# Patient Record
Sex: FEMALE | Race: WHITE | NOT HISPANIC OR LATINO | Employment: FULL TIME | URBAN - METROPOLITAN AREA
[De-identification: names, ages, dates, MRNs, and addresses within clinical notes are randomized per-mention and may not be internally consistent; named-entity substitution may affect disease eponyms.]

---

## 2017-07-20 NOTE — PATIENT INSTRUCTIONS
Preventive Health Recommendations  Female Ages 40 to 49    Yearly exam:     See your health care provider every year in order to  1. Review health changes.   2. Discuss preventive care.    3. Review your medicines if your doctor prescribed any.      Get a Pap test every three years (unless you have an abnormal result and your provider advises testing more often).      If you get Pap tests with HPV test, you only need to test every 5 years, unless you have an abnormal result. You do not need a Pap test if your uterus was removed (hysterectomy) and you have not had cancer.      You should be tested each year for STDs (sexually transmitted diseases), if you're at risk.       Ask your doctor if you should have a mammogram.      Have a colonoscopy (test for colon cancer) if someone in your family has had colon cancer or polyps before age 50.       Have a cholesterol test every 5 years.       Have a diabetes test (fasting glucose) after age 45. If you are at risk for diabetes, you should have this test every 3 years.    Shots: Get a flu shot each year. Get a tetanus shot every 10 years.     Nutrition:     Eat at least 5 servings of fruits and vegetables each day.    Eat whole-grain bread, whole-wheat pasta and brown rice instead of white grains and rice.    Talk to your provider about Calcium and Vitamin D.     Lifestyle    Exercise at least 150 minutes a week (an average of 30 minutes a day, 5 days a week). This will help you control your weight and prevent disease.    Limit alcohol to one drink per day.    No smoking.     Wear sunscreen to prevent skin cancer.    See your dentist every six months for an exam and cleaning.    --------------  1. Labs today: cholesterol, diabetes screen, liver function, kidney function, thyroid function and hemoglobin  2. Track periods - if continue to be heavy and not anemic, would recommend pelvic ultrasound and could discuss other options  3. Try to make small changes to help with  stress

## 2017-07-21 ENCOUNTER — OFFICE VISIT (OUTPATIENT)
Dept: PEDIATRICS | Facility: CLINIC | Age: 47
End: 2017-07-21
Payer: COMMERCIAL

## 2017-07-21 VITALS
WEIGHT: 232.2 LBS | OXYGEN SATURATION: 99 % | TEMPERATURE: 97.5 F | BODY MASS INDEX: 37.32 KG/M2 | HEART RATE: 96 BPM | HEIGHT: 66 IN | SYSTOLIC BLOOD PRESSURE: 110 MMHG | DIASTOLIC BLOOD PRESSURE: 72 MMHG

## 2017-07-21 DIAGNOSIS — N92.1 MENORRHAGIA WITH IRREGULAR CYCLE: ICD-10-CM

## 2017-07-21 DIAGNOSIS — Z13.220 SCREENING CHOLESTEROL LEVEL: ICD-10-CM

## 2017-07-21 DIAGNOSIS — E66.09 NON MORBID OBESITY DUE TO EXCESS CALORIES: ICD-10-CM

## 2017-07-21 DIAGNOSIS — Z13.1 SCREENING FOR DIABETES MELLITUS: ICD-10-CM

## 2017-07-21 DIAGNOSIS — R63.5 WEIGHT GAIN: ICD-10-CM

## 2017-07-21 DIAGNOSIS — F43.9 STRESS: ICD-10-CM

## 2017-07-21 DIAGNOSIS — Z01.411 ENCOUNTER FOR GYNECOLOGICAL EXAMINATION WITH ABNORMAL FINDING: Primary | ICD-10-CM

## 2017-07-21 LAB — HGB BLD-MCNC: 11.5 G/DL (ref 11.7–15.7)

## 2017-07-21 PROCEDURE — 80061 LIPID PANEL: CPT | Performed by: INTERNAL MEDICINE

## 2017-07-21 PROCEDURE — 85018 HEMOGLOBIN: CPT | Performed by: INTERNAL MEDICINE

## 2017-07-21 PROCEDURE — 99386 PREV VISIT NEW AGE 40-64: CPT | Performed by: INTERNAL MEDICINE

## 2017-07-21 PROCEDURE — 84443 ASSAY THYROID STIM HORMONE: CPT | Performed by: INTERNAL MEDICINE

## 2017-07-21 PROCEDURE — 80053 COMPREHEN METABOLIC PANEL: CPT | Performed by: INTERNAL MEDICINE

## 2017-07-21 PROCEDURE — 36415 COLL VENOUS BLD VENIPUNCTURE: CPT | Performed by: INTERNAL MEDICINE

## 2017-07-21 PROCEDURE — 99213 OFFICE O/P EST LOW 20 MIN: CPT | Mod: 25 | Performed by: INTERNAL MEDICINE

## 2017-07-21 NOTE — MR AVS SNAPSHOT
After Visit Summary   7/21/2017    Veronica Messina    MRN: [id number]           Patient Information     Date Of Birth          1970        Visit Information        Provider Department      7/21/2017 9:00 AM Liza Day MD [facility]        Today's Diagnoses     Encounter for gynecological examination with abnormal finding    -  1    Menorrhagia with irregular cycle        Screening cholesterol level        Screening for diabetes mellitus          Care Instructions      Preventive Health Recommendations  Female Ages 40 to 49    Yearly exam:     See your health care provider every year in order to  1. Review health changes.   2. Discuss preventive care.    3. Review your medicines if your doctor prescribed any.      Get a Pap test every three years (unless you have an abnormal result and your provider advises testing more often).      If you get Pap tests with HPV test, you only need to test every 5 years, unless you have an abnormal result. You do not need a Pap test if your uterus was removed (hysterectomy) and you have not had cancer.      You should be tested each year for STDs (sexually transmitted diseases), if you're at risk.       Ask your doctor if you should have a mammogram.      Have a colonoscopy (test for colon cancer) if someone in your family has had colon cancer or polyps before age 50.       Have a cholesterol test every 5 years.       Have a diabetes test (fasting glucose) after age 45. If you are at risk for diabetes, you should have this test every 3 years.    Shots: Get a flu shot each year. Get a tetanus shot every 10 years.     Nutrition:     Eat at least 5 servings of fruits and vegetables each day.    Eat whole-grain bread, whole-wheat pasta and brown rice instead of white grains and rice.    Talk to your provider about Calcium and Vitamin D.     Lifestyle    Exercise at least 150 minutes a week (an average of 30 minutes a day, 5 days a week). This  "will help you control your weight and prevent disease.    Limit alcohol to one drink per day.    No smoking.     Wear sunscreen to prevent skin cancer.    See your dentist every six months for an exam and cleaning.    --------------  1. Labs today: cholesterol, diabetes screen, liver function, kidney function, thyroid function and hemoglobin  2. Track periods - if continue to be heavy and not anemic, would recommend pelvic ultrasound and could discuss other options  3. Try to make small changes to help with stress          Follow-ups after your visit        Who to contact     If you have questions or need follow up information about today's clinic visit or your schedule please contact [facility] directly at [phone].  Normal or non-critical lab and imaging results will be communicated to you by Breathometerhart, letter or phone within 4 business days after the clinic has received the results. If you do not hear from us within 7 days, please contact the clinic through Yokat or phone. If you have a critical or abnormal lab result, we will notify you by phone as soon as possible.  Submit refill requests through Amp'd Mobile or call your pharmacy and they will forward the refill request to us. Please allow 3 business days for your refill to be completed.          Additional Information About Your Visit        Amp'd Mobile Information     Amp'd Mobile lets you send messages to your doctor, view your test results, renew your prescriptions, schedule appointments and more. To sign up, go to [email or web address] . Click on \"Log in\" on the left side of the screen, which will take you to the Welcome page. Then click on \"Sign up Now\" on the right side of the page.     You will be asked to enter the access code listed below, as well as some personal information. Please follow the directions to create your username and password.     Your access code is: [email or web address]  Expires: 10/19/2017 10:18 AM     Your access code will  in " "90 days. If you need help or a new code, please call your [facility] clinic or [phone].        Care EveryWhere ID     This is your Care EveryWhere ID. This could be used by other organizations to access your [facility] medical records  [id number]        Your Vitals Were     Pulse Temperature Height Pulse Oximetry BMI (Body Mass Index)       96 97.5  F (36.4  C) (Tympanic) 5' 6\" (1.676 m) 99% 37.48 kg/m2        Blood Pressure from Last 3 Encounters:   07/21/17 110/72    Weight from Last 3 Encounters:   07/21/17 232 lb 3.2 oz (105.3 kg)              We Performed the Following     Comprehensive metabolic panel     Hemoglobin     Lipid panel reflex to direct LDL     TSH with free T4 reflex        Primary Care Provider Fax #    [phone]       No address on file        Equal Access to Services     JAMES MURPHY : Hadrosa Portillo, wanicolasa rosenberg, delmisybolivier kaalmafigueroa herrera, cristobal mckinney . So [facility] [phone].    ATENCIÓN: Si habla español, tiene a feldman disposición servicios gratuitos de asistencia lingüística. Llame al [phone].    We comply with applicable federal civil rights laws and [place] laws. We do not discriminate on the basis of race, color, national origin, age, disability sex, sexual orientation or gender identity.            Thank you!     Thank you for choosing [facility]  for your care. Our goal is always to provide you with excellent care. Hearing back from our patients is one way we can continue to improve our services. Please take a few minutes to complete the written survey that you may receive in the mail after your visit with us. Thank you!             Your Updated Medication List - Protect others around you: Learn how to safely use, store and throw away your medicines at www.disposemymeds.org.      Notice  As of 7/21/2017 10:18 AM    You have not been prescribed any medications.      "

## 2017-07-21 NOTE — PROGRESS NOTES
SUBJECTIVE:   CC: Veronica Messina is an 47 year old woman who presents for preventive health visit.     Physical   Annual:     Getting at least 3 servings of Calcium per day::  Yes    Bi-annual eye exam::  Yes    Dental care twice a year::  Yes    Sleep apnea or symptoms of sleep apnea::  None    Diet::  Regular (no restrictions)    Taking medications regularly::  Yes    Medication side effects::  Not applicable    Additional concerns today::  YES    Weight gain, stress: pt has adopted girl with ADHD who is 8 years old. Very stressful to raise her. Does not listen and hard to get her to do things. In addition, has a lot of financial stressors. Some of it is related to cost of healthcare for daughter. Currently working 3 jobs to try to pay off bills. 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 Boss was not very supportive. Is getting used to position and doing somewhat better now.  tries to help, but majority of household tasks still fall on patient. Has little time to herself. Not able to exercise. Has been eating to deal with stress and gained a lot of weight. Did not realize how much until weighed today. Has been struggle whole life. Had trouble with infertility and told needed to lose weight. Was able to do so with atkins diet but unable to keep a pregnancy. Frustrated and upset she has let herself gain so much of it back. Has some symptoms of depression, but does not feel depressed. Does not feel needs medication, but would like to lose weight.    Menorrhagia: Periods heavier over last 1.5-2 years. Have been more irregular. Has back pain, night sweats before. Tend to be heavy when goes longer than 4 weeks between cycles. Has soaked through pad and stained through clothes before. Is embarrassing.     Today's PHQ-2 Score:   PHQ-2 ( 1999 [employer]) 7/21/2017   Q1: Little interest or pleasure in doing things 0   Q2: Feeling down, depressed or hopeless 1   PHQ-2 Score 1   Q1: Little interest  or pleasure in doing things Not at all   Q2: Feeling down, depressed or hopeless Several days   PHQ-2 Score 1     Abuse: Current or Past(Physical, Sexual or Emotional)- No  Do you feel safe in your environment - Yes    Social History   Substance Use Topics     Smoking status: Never Smoker     Smokeless tobacco: Never Used     Alcohol use No      Comment: occ     The patient does not drink >3 drinks per day nor >7 drinks per week.    Reviewed orders with patient.  Reviewed health maintenance and updated orders accordingly - Yes  BP Readings from Last 3 Encounters:   07/21/17 110/72    Wt Readings from Last 3 Encounters:   07/21/17 232 lb 3.2 oz (105.3 kg)         Patient under age 50, mutual decision reflected in health maintenance. Last mammo 2012    Pertinent mammograms are reviewed under the imaging tab.  History of abnormal Pap smear: NO - age 30- 65 PAP every 3 years recommended    Reviewed and updated as needed this visit by clinical staffTobacco  Allergies  Meds  Problems  Med Hx  Surg Hx  Fam Hx  Soc Hx        Reviewed and updated as needed this visit by Provider  Allergies  Meds  Problems          ROS:  C: weight gain, NEGATIVE for fever, chills  I: NEGATIVE for worrisome rashes, moles or lesions  E: NEGATIVE for vision changes or irritation  ENT: NEGATIVE for ear, mouth and throat problems  R: NEGATIVE for significant cough or SOB  B: NEGATIVE for masses, tenderness or discharge  CV: NEGATIVE for chest pain, palpitations or peripheral edema  GI: NEGATIVE for nausea, abdominal pain, heartburn, or change in bowel habits  : NEGATIVE for unusual urinary or vaginal symptoms. Periods are irregular and heavy.  M: NEGATIVE for significant arthralgias or myalgia  N: NEGATIVE for weakness, dizziness or paresthesias  P: stress, NEGATIVE for changes in mood or affect     OBJECTIVE:   /72 (BP Location: Right arm, Patient Position: Chair, Cuff Size: Adult Large)  Pulse 96  Temp 97.5  F (36.4  C)  "(Tympanic)  Ht 5' 6\" (1.676 m)  Wt 232 lb 3.2 oz (105.3 kg)  SpO2 99%  BMI 37.48 kg/m2  EXAM:  GENERAL: obese, alert and no distress  EYES: Eyes grossly normal to inspection, PERRL and conjunctivae and sclerae normal  HENT: ear canals and TM's normal, nose and mouth without ulcers or lesions  NECK: no adenopathy, no asymmetry, masses, or scars and thyroid normal to palpation  RESP: lungs clear to auscultation - no rales, rhonchi or wheezes  BREAST: normal without masses, tenderness or nipple discharge and no palpable axillary masses or adenopathy  CV: regular rate and rhythm, normal S1 S2, no S3 or S4, no murmur, click or rub, no peripheral edema and peripheral pulses strong  ABDOMEN: obese, soft, nontender, no hepatosplenomegaly, no masses and bowel sounds normal  MS: no gross musculoskeletal defects noted, no edema  SKIN: no suspicious lesions or rashes  NEURO: Normal strength and tone, mentation intact and speech normal  PSYCH: crying at times    Results for orders placed or performed in visit on 07/21/17   Lipid panel reflex to direct LDL   Result Value Ref Range    Cholesterol 188 <200 mg/dL    Triglycerides 66 <150 mg/dL    HDL Cholesterol 78 >49 mg/dL    LDL Cholesterol Calculated 97 <100 mg/dL    Non HDL Cholesterol 110 <130 mg/dL   Hemoglobin   Result Value Ref Range    Hemoglobin 11.5 (L) 11.7 - 15.7 g/dL   Comprehensive metabolic panel   Result Value Ref Range    Sodium 140 133 - 144 mmol/L    Potassium 4.2 3.4 - 5.3 mmol/L    Chloride 105 94 - 109 mmol/L    Carbon Dioxide 27 20 - 32 mmol/L    Anion Gap 8 3 - 14 mmol/L    Glucose 93 70 - 99 mg/dL    Urea Nitrogen 11 7 - 30 mg/dL    Creatinine 0.73 0.52 - 1.04 mg/dL    GFR Estimate 85 >60 mL/min/1.7m2    GFR Estimate If Black >90   GFR Calc   >60 mL/min/1.7m2    Calcium 9.2 8.5 - 10.1 mg/dL    Bilirubin Total 0.4 0.2 - 1.3 mg/dL    Albumin 3.9 3.4 - 5.0 g/dL    Protein Total 7.9 6.8 - 8.8 g/dL    Alkaline Phosphatase 78 40 - 150 U/L    " ALT 17 0 - 50 U/L    AST 18 0 - 45 U/L   TSH with free T4 reflex   Result Value Ref Range    TSH 1.03 0.40 - 4.00 mU/L       ASSESSMENT/PLAN:   1. Encounter for gynecological examination with abnormal finding  Pap q3 - due 2018  Would like to hold off on mammo - last in 2012, but no family history  tdap UTD  Chol today    2. Weight gain  Very likely due to stress and eating habits. Encouraged to make small changes at a time both in diet and in finding time for exercise so does not get too overwhelmed. Pt knows what she needs to do to lose weight.   - OFFICE/OUTPT VISIT,EST,LEVL III    3. Menorrhagia with irregular cycle  Likely due to perimenopause. Very mild anemia. Recommend increase iron in diet. Would like to hold off on now for pelvic US, but potentially interested in ablation or hyst to take care of heavy bleeding. Will let me know when she is ready for the US.  - Hemoglobin  - TSH with free T4 reflex  - OFFICE/OUTPT VISIT,EST,LEVL III    4. Stress  Home, work and financial. Working with family therapist. Believes will have some individual therapy as part of this as well. Discussed sleep, exercise and meditation. Again encouraged small changes to help with lifestyle, home/work balance. Encouraged to ask for small things from daughter/  to help make this better and maybe will be able to equalize tasks more if makes changes gradually. Will f/u if worsens.  - OFFICE/OUTPT VISIT,EST,LEVL III    5. Non morbid obesity due to excess calories  As above. Pt aware of triggers and has successfully lost weight in past. Very overwhelmed right now. Encouraged to make slow changes to more healthy habits.   - OFFICE/OUTPT VISIT,EST,LEVL III    6. Screening cholesterol level  - Lipid panel reflex to direct LDL    7. Screening for diabetes mellitus  - Comprehensive metabolic panel    COUNSELING:  Reviewed preventive health counseling, as reflected in patient instructions  Special attention given to:        Regular  "exercise       Healthy diet/nutrition       (Jovita)menopause management       reports that she has never smoked. She has never used smokeless tobacco.    Estimated body mass index is 37.48 kg/(m^2) as calculated from the following:    Height as of this encounter: 5' 6\" (1.676 m).    Weight as of this encounter: 232 lb 3.2 oz (105.3 kg).   Weight management plan: Discussed healthy diet and exercise guidelines and patient will follow up in 6 months in clinic to re-evaluate.    Counseling Resources:  ATP IV Guidelines  Pooled Cohorts Equation Calculator  Breast Cancer Risk Calculator  FRAX Risk Assessment  ICSI Preventive Guidelines  Dietary Guidelines for Americans, 2010  USDA's MyPlate  ASA Prophylaxis  Lung CA Screening    Liza Day MD  [facility]    Answers for HPI/ROS submitted by the patient on 7/21/2017   PHQ-2 Score: 1    "

## 2017-07-21 NOTE — NURSING NOTE
"Chief Complaint   Patient presents with     Physical       Initial /72 (BP Location: Right arm, Patient Position: Chair, Cuff Size: Adult Large)  Pulse 96  Temp 97.5  F (36.4  C) (Tympanic)  Ht 5' 6\" (1.676 m)  Wt 232 lb 3.2 oz (105.3 kg)  SpO2 99%  BMI 37.48 kg/m2 Estimated body mass index is 37.48 kg/(m^2) as calculated from the following:    Height as of this encounter: 5' 6\" (1.676 m).    Weight as of this encounter: 232 lb 3.2 oz (105.3 kg).  Medication Reconciliation: complete   Val Bryant LPN      "

## 2017-07-22 PROBLEM — E66.09 NON MORBID OBESITY DUE TO EXCESS CALORIES: Status: ACTIVE | Noted: 2017-07-22

## 2017-07-22 LAB
ALBUMIN SERPL-MCNC: 3.9 G/DL (ref 3.4–5)
ALP SERPL-CCNC: 78 U/L (ref 40–150)
ALT SERPL W P-5'-P-CCNC: 17 U/L (ref 0–50)
ANION GAP SERPL CALCULATED.3IONS-SCNC: 8 MMOL/L (ref 3–14)
AST SERPL W P-5'-P-CCNC: 18 U/L (ref 0–45)
BILIRUB SERPL-MCNC: 0.4 MG/DL (ref 0.2–1.3)
BUN SERPL-MCNC: 11 MG/DL (ref 7–30)
CALCIUM SERPL-MCNC: 9.2 MG/DL (ref 8.5–10.1)
CHLORIDE SERPL-SCNC: 105 MMOL/L (ref 94–109)
CHOLEST SERPL-MCNC: 188 MG/DL
CO2 SERPL-SCNC: 27 MMOL/L (ref 20–32)
CREAT SERPL-MCNC: 0.73 MG/DL (ref 0.52–1.04)
GFR SERPL CREATININE-BSD FRML MDRD: 85 ML/MIN/1.7M2
GLUCOSE SERPL-MCNC: 93 MG/DL (ref 70–99)
HDLC SERPL-MCNC: 78 MG/DL
LDLC SERPL CALC-MCNC: 97 MG/DL
NONHDLC SERPL-MCNC: 110 MG/DL
POTASSIUM SERPL-SCNC: 4.2 MMOL/L (ref 3.4–5.3)
PROT SERPL-MCNC: 7.9 G/DL (ref 6.8–8.8)
SODIUM SERPL-SCNC: 140 MMOL/L (ref 133–144)
TRIGL SERPL-MCNC: 66 MG/DL
TSH SERPL DL<=0.005 MIU/L-ACNC: 1.03 MU/L (ref 0.4–4)

## 2017-07-31 ENCOUNTER — TELEPHONE (OUTPATIENT)
Dept: PEDIATRICS | Facility: CLINIC | Age: 47
End: 2017-07-31

## 2017-07-31 NOTE — TELEPHONE ENCOUNTER
Pt had px done on 07/21, just found out that the biometric screening form need to be turned in by tonight to receive the $750 incentive. Pt would like us to fill the form asap & get back to her through email.     Provided my work email id, will await for pt's email. Received email, filled form & emailed back to pt as per her request. Sent email back to pt that I have emailed it about 10 mnis ago. Saved emailed form in Leah's faxed pile, in case if pt didn't receive the form.    Fritz, RN  Triage Nurse

## 2018-01-18 ENCOUNTER — TELEPHONE (OUTPATIENT)
Dept: PEDIATRICS | Facility: CLINIC | Age: 48
End: 2018-01-18

## 2018-01-18 NOTE — TELEPHONE ENCOUNTER
"Patient Communication Preferences indicate  Do not contact  and/or communication by \"Phone\" is not preferred. Call not required per Outreach team.        Outreach ,  Sudhakar Whitney     "

## 2018-05-03 ENCOUNTER — OFFICE VISIT (OUTPATIENT)
Dept: PEDIATRICS | Facility: CLINIC | Age: 48
End: 2018-05-03
Payer: COMMERCIAL

## 2018-05-03 VITALS
SYSTOLIC BLOOD PRESSURE: 128 MMHG | HEART RATE: 58 BPM | TEMPERATURE: 97.4 F | HEIGHT: 66 IN | OXYGEN SATURATION: 99 % | BODY MASS INDEX: 36.9 KG/M2 | DIASTOLIC BLOOD PRESSURE: 66 MMHG | WEIGHT: 229.6 LBS

## 2018-05-03 DIAGNOSIS — M79.672 LEFT FOOT PAIN: ICD-10-CM

## 2018-05-03 DIAGNOSIS — Z00.01 ENCOUNTER FOR ROUTINE ADULT HEALTH EXAMINATION WITH ABNORMAL FINDINGS: Primary | ICD-10-CM

## 2018-05-03 DIAGNOSIS — Z13.220 SCREENING CHOLESTEROL LEVEL: ICD-10-CM

## 2018-05-03 DIAGNOSIS — Z13.1 SCREENING FOR DIABETES MELLITUS: ICD-10-CM

## 2018-05-03 DIAGNOSIS — Z12.31 ENCOUNTER FOR SCREENING MAMMOGRAM FOR BREAST CANCER: ICD-10-CM

## 2018-05-03 DIAGNOSIS — D64.9 ANEMIA, UNSPECIFIED TYPE: ICD-10-CM

## 2018-05-03 LAB — HGB BLD-MCNC: 11.4 G/DL (ref 11.7–15.7)

## 2018-05-03 PROCEDURE — 36415 COLL VENOUS BLD VENIPUNCTURE: CPT | Performed by: INTERNAL MEDICINE

## 2018-05-03 PROCEDURE — 80061 LIPID PANEL: CPT | Performed by: INTERNAL MEDICINE

## 2018-05-03 PROCEDURE — 80053 COMPREHEN METABOLIC PANEL: CPT | Performed by: INTERNAL MEDICINE

## 2018-05-03 PROCEDURE — 99213 OFFICE O/P EST LOW 20 MIN: CPT | Mod: 25 | Performed by: INTERNAL MEDICINE

## 2018-05-03 PROCEDURE — 99396 PREV VISIT EST AGE 40-64: CPT | Performed by: INTERNAL MEDICINE

## 2018-05-03 PROCEDURE — 85018 HEMOGLOBIN: CPT | Performed by: INTERNAL MEDICINE

## 2018-05-03 ASSESSMENT — ENCOUNTER SYMPTOMS
FEVER: 0
WEAKNESS: 0
MYALGIAS: 1
JOINT SWELLING: 0
HEMATOCHEZIA: 0
HEARTBURN: 0
DIZZINESS: 0
DYSURIA: 0
SORE THROAT: 0
DIARRHEA: 0
COUGH: 0
CONSTIPATION: 0
HEMATURIA: 0
EYE PAIN: 0
FREQUENCY: 0
CHILLS: 0
HEADACHES: 0
ABDOMINAL PAIN: 0
NERVOUS/ANXIOUS: 0
NAUSEA: 0
PALPITATIONS: 0
PARESTHESIAS: 0
ARTHRALGIAS: 0

## 2018-05-03 NOTE — PROGRESS NOTES
SUBJECTIVE:   CC: Veronica Messina is an 48 year old woman who presents for preventive health visit.     Physical   Annual:     Getting at least 3 servings of Calcium per day::  Yes    Bi-annual eye exam::  Yes    Dental care twice a year::  Yes    Sleep apnea or symptoms of sleep apnea::  None    Diet::  Regular (no restrictions)    Frequency of exercise::  1 day/week    Duration of exercise::  15-30 minutes    Taking medications regularly::  Yes    Medication side effects::  Not applicable    Additional concerns today::  YES          Needs cholesterol/diabetes screen for work.      Periods - about every other month is very heavy. Getting every month. Still gets night sweats right before menstrual cycle. No hot flashes during the day.     Moles - has one on side of breast and on stomach that are changing. Has had previous moles removed that have been ok in the past. Has a history of tanning.    Musculoskeletal problem/pain      Duration: 2/2018    Description  Location: left foot    Intensity:  moderate, severe    Accompanying signs and symptoms: radiation of pain to calf and redness    History  Previous similar problem: no   Previous evaluation:  none    Precipitating or alleviating factors:  Trauma or overuse: no   Aggravating factors include: standing, walking, climbing stairs, exercise and overuse    Therapies tried and outcome: rest/inactivity    About 4 months ago was walking in [place] on business trip, had a sharp pain in bottom of heal. Was getting shooting pain up calf. Pain throughout the day, worse later in the pain. Painful, burning pressure on bottom of foot. Worse with not wearing shoes. Has not been wearing heals. Uses aleve 1 pill a day or less. Doesn't really help. No previous similar symptoms. Ok if on ball of foot or toes. Worse if walks or stands, puts pressure on bottom of foot.    Today's PHQ-2 Score:   PHQ-2 ( 1999 [employer]) 5/3/2018   Q1: Little interest or pleasure in doing things 0   Q2:  Feeling down, depressed or hopeless 0   PHQ-2 Score 0   Q1: Little interest or pleasure in doing things Not at all   Q2: Feeling down, depressed or hopeless Not at all   PHQ-2 Score 0     Abuse: Current or Past(Physical, Sexual or Emotional)- No  Do you feel safe in your environment - Yes    Social History   Substance Use Topics     Smoking status: Never Smoker     Smokeless tobacco: Never Used     Alcohol use No      Comment: occ     Alcohol Use 5/3/2018   If you drink alcohol do you typically have greater than 3 drinks per day OR greater than 7 drinks per week? No   No flowsheet data found.    Reviewed orders with patient.  Reviewed health maintenance and updated orders accordingly - Yes  Patient Active Problem List   Diagnosis     obesity     Past Surgical History:   Procedure Laterality Date     BUNIONECTOMY Left      BUNIONECTOMY Right      HC REPAIR OF HAMMERTOE,ONE Right      HYSTEROSCOPY       TONSILLECTOMY  1977       Social History   Substance Use Topics     Smoking status: Never Smoker     Smokeless tobacco: Never Used     Alcohol use No      Comment: occ     Family History   Problem Relation Age of Onset     Hypertension Mother      DIABETES Mother      Prostate Problems Father      Arrhythmia Brother      WPW     Obesity Brother      Thyroid Disease Maternal Grandmother      Dementia Maternal Grandmother      DIABETES Maternal Grandfather      KIDNEY DISEASE Maternal Grandfather      HEART DISEASE Maternal Grandfather      HEART DISEASE Paternal Grandmother      CANCER Paternal Grandfather      throat     Lung Cancer Paternal Grandfather          Patient under age 50, mutual decision reflected in health maintenance.    Pertinent mammograms are reviewed under the imaging tab.  History of abnormal Pap smear: NO - age 30- 65 PAP every 3 years recommended    Reviewed and updated as needed this visit by clinical staff  Tobacco  Allergies  Meds  Problems  Med Hx  Surg Hx  Fam Hx  Soc Hx        Reviewed  "and updated as needed this visit by Provider  Allergies  Meds  Problems          Review of Systems   Constitutional: Negative for chills and fever.   HENT: Negative for congestion, ear pain, hearing loss and sore throat.    Eyes: Negative for pain and visual disturbance.   Respiratory: Negative for cough.    Cardiovascular: Negative for chest pain, palpitations and peripheral edema.   Gastrointestinal: Negative for abdominal pain, constipation, diarrhea, heartburn, hematochezia and nausea.   Genitourinary: Positive for urgency. Negative for dysuria, frequency, genital sores, hematuria, pelvic pain and vaginal bleeding.   Musculoskeletal: Positive for myalgias. Negative for arthralgias and joint swelling.        Left foot pain   Skin: Negative for rash.        Changing mole on left breast and left abdomen   Neurological: Negative for dizziness, weakness, headaches and paresthesias.   Psychiatric/Behavioral: Negative for mood changes. The patient is not nervous/anxious.       OBJECTIVE:   /66 (BP Location: Right arm, Patient Position: Sitting, Cuff Size: Adult Large)  Pulse 58  Temp 97.4  F (36.3  C) (Tympanic)  Ht 5' 6\" (1.676 m)  Wt 229 lb 9.6 oz (104.1 kg)  LMP 04/10/2018 (Approximate)  SpO2 99%  BMI 37.06 kg/m2  Physical Exam  GENERAL: obese, alert and no distress  EYES: Eyes grossly normal to inspection, PERRL and conjunctivae and sclerae normal  HENT: ear canals and TM's normal, nose and mouth without ulcers or lesions  NECK: no adenopathy, no asymmetry, masses, or scars and thyroid normal to palpation  RESP: lungs clear to auscultation - no rales, rhonchi or wheezes  BREAST: normal without masses, tenderness or nipple discharge and no palpable axillary masses or adenopathy  CV: regular rate and rhythm, normal S1 S2, no S3 or S4, no murmur, click or rub, no peripheral edema and peripheral pulses strong  ABDOMEN: soft, nontender, no hepatosplenomegaly, no masses and bowel sounds normal  MS: normal " ROM of foot. Tenderness under left arch extending toward heal. No tenderness over heel itself or over achilles. no gross musculoskeletal defects noted, no edema  SKIN: seborrheic keratosis over left breast and left abdomen. Multiple moles. no suspicious lesions or rashes  NEURO: Normal strength and tone, mentation intact and speech normal  PSYCH: mentation appears normal, affect normal/bright    ASSESSMENT/PLAN:   1. Encounter for routine adult health examination with abnormal findings  Pap due next year  Will schedule mammogram  Plan on skin check with Dermatology in 1-2 years   tdap UTD    2. Anemia, unspecified type  Due to heavy periods  - Hemoglobin    3. Left foot pain  Bottom of left foot. Most likely plantar fasciitis; however, sudden onset a little unusual. Discussed importance of wearing supportive shoes at all times from when gets up in the am. Ice to foot. Stretches given. Recommend see podiatry for evaluation given length of time with pain.  - PODIATRY/FOOT & ANKLE SURGERY REFERRAL    4. Screening cholesterol level  - Comprehensive metabolic panel  Will mail completed form back to patient once labs back. Does not need faxed because employee number not on form.    5. Screening for diabetes mellitus  - Lipid panel reflex to direct LDL Fasting    6. Encounter for screening mammogram for breast cancer  - MA Screening Digital Bilateral; Future    COUNSELING:  Reviewed preventive health counseling, as reflected in patient instructions  Special attention given to:        Regular exercise       Healthy diet/nutrition       (Jovita)menopause management    BP Screening:   Last 3 BP Readings:    BP Readings from Last 3 Encounters:   05/03/18 128/66   07/21/17 110/72       The following was recommended to the patient:  Re-screen BP within a year and recommended lifestyle modifications     reports that she has never smoked. She has never used smokeless tobacco.    Estimated body mass index is 37.06 kg/(m^2) as  "calculated from the following:    Height as of this encounter: 5' 6\" (1.676 m).    Weight as of this encounter: 229 lb 9.6 oz (104.1 kg).   Weight management plan: Discussed healthy diet and exercise guidelines and patient will follow up in 6 months in clinic to re-evaluate.    Counseling Resources:  ATP IV Guidelines  Pooled Cohorts Equation Calculator  Breast Cancer Risk Calculator  FRAX Risk Assessment  ICSI Preventive Guidelines  Dietary Guidelines for Americans, 2010  USDA's MyPlate  ASA Prophylaxis  Lung CA Screening    Liza Day MD  [facility]    Answers for HPI/ROS submitted by the patient on 5/3/2018   PHQ-2 Score: 0    "

## 2018-05-03 NOTE — PATIENT INSTRUCTIONS
Preventive Health Recommendations  Female Ages 40 to 49    Yearly exam:     See your health care provider every year in order to  1. Review health changes.   2. Discuss preventive care.    3. Review your medicines if your doctor prescribed any.      Get a Pap test every three years (unless you have an abnormal result and your provider advises testing more often).      If you get Pap tests with HPV test, you only need to test every 5 years, unless you have an abnormal result. You do not need a Pap test if your uterus was removed (hysterectomy) and you have not had cancer.      You should be tested each year for STDs (sexually transmitted diseases), if you're at risk.       Ask your doctor if you should have a mammogram.      Have a colonoscopy (test for colon cancer) if someone in your family has had colon cancer or polyps before age 50.       Have a cholesterol test every 5 years.       Have a diabetes test (fasting glucose) after age 45. If you are at risk for diabetes, you should have this test every 3 years.    Shots: Get a flu shot each year. Get a tetanus shot every 10 years.     Nutrition:     Eat at least 5 servings of fruits and vegetables each day.    Eat whole-grain bread, whole-wheat pasta and brown rice instead of white grains and rice.    Talk to your provider about Calcium and Vitamin D.     Lifestyle    Exercise at least 150 minutes a week (an average of 30 minutes a day, 5 days a week). This will help you control your weight and prevent disease.    Limit alcohol to one drink per day.    No smoking.     Wear sunscreen to prevent skin cancer.    See your dentist every six months for an exam and cleaning.    -------------  1. Labs today: cholesterol, blood sugar, liver function, kidney function, hemoglobin  2. Pap due next year  3. Will mail form back to you once labs are completed  4. Schedule appointment with Podiatry  - ice to bottom of food  - continue aleve as needed  - stretches below  - try to  wear supportive shoes from the time you get up in the morning  5. Schedule mammogram - [phone]  6. Recommend full skin check with Dermatology next year                           Plantar Fasciitis   What is plantar fasciitis?   Plantar fasciitis is a painful inflammation of the bottom of the foot between the ball of the foot and the heel.   How does it occur?   There are several possible causes of plantar fasciitis, including:     wearing high heels     gaining weight     increased walking, standing, or stair-climbing   If you wear high-heeled shoes, including western-style boots, for long periods of time, the tough, tendonlike tissue of the bottom of your foot can become shorter. This layer of tissue is called fascia. Pain occurs when you stretch fascia that has shortened. This painful stretching might happen, for example, when you walk barefoot after getting out of bed in the morning.   If you gain weight, you might be more likely to have plantar fasciitis, especially if you walk a lot or  shoes with poor heel cushioning. Normally there is a pad of fatty tissue under your heel bone. Weight gain might break down this fat pad and cause heel pain.   Runners may get plantar fasciitis when they change their workout and increase their mileage or frequency of workouts. It can also occur with a change in exercise surface or terrain, or if your shoes are worn out and don't provide enough cushion for your heels.   If the arches of your foot are abnormally high or low, you are more likely to develop plantar fasciitis than if your arches are normal.   What are the symptoms?   The main symptom of plantar fasciitis is heel pain when you walk. You may also feel pain when you stand and possibly even when you are resting. This pain typically occurs first thing in the morning after you get out of bed, when your foot is placed flat on the floor. The pain occurs because you are stretching the plantar fascia. The pain  usually lessens with more walking, but you may have it again after periods of rest.   You may feel no pain when you are sleeping because the position of your feet during rest allows the fascia to shorten and relax.   How is it diagnosed?   Your healthcare provider will ask about your symptoms. He or she will ask if the bottom of your heel is tender and if you have pain when you stretch the bottom of your foot. An X-ray of your heel may be done.   How is it treated?     Give your painful heel lots of rest. You may need to stay completely off your foot for several days when the pain is severe.     Your healthcare provider may recommend or prescribe anti-inflammatory medicines, such as aspirin or ibuprofen. These drugs decrease pain and inflammation. Adults aged 65 years and older should not take non-steroidal anti-inflammatory medicine for more than 7 days without their healthcare provider's approval. Resting your heel on an ice pack for a few minutes several times a day can also help.     Try to cushion your foot. You can do this by wearing athletic shoes, even at work, for awhile. Heel cushions can also be used. The cushions should be worn in both shoes. They are most helpful if you are overweight or an older adult.     Your provider may recommend special arch supports or inserts for your shoes called orthotics, either custom-made or off the shelf. These supports can be particularly helpful if you have flat feet or high arches.     Your provider may recommend an injection of a cortisone-like medicine.     Lose weight if needed.     A night splint may be recommended. This will keep the plantar fascia stretched while you are sleeping.     Physical therapy for additional treatments may be recommended     Surgery is rarely needed.   How long will the effects last?   You may find that the pain is sometimes worse and sometimes better over time. If you get treatment soon after you notice the pain, the symptoms should stop  after several weeks. If, however, you have had plantar fasciitis for a long time, it may take many weeks to months for the pain to go away.   When can I return to my normal activities?   Everyone recovers from an injury at a different rate. Return to your activities will be determined by how soon your foot recovers, not by how many days or weeks it has been since your injury has occurred. In general, the longer you have symptoms before you start treatment, the longer it will take to get better. The goal of rehabilitation is to return you to your normal activities as soon as is safely possible. If you return too soon you may worsen your injury.   You may safely return to your activities when, starting from the top of the list and progressing to the end, each of the following is true:     You have full range of motion in the injured foot compared with the uninjured foot.     You have full strength of the injured foot compared with the uninjured foot.     You can walk straight ahead without significant pain or limping.   How can I prevent plantar fasciitis?   The best way to prevent plantar fasciitis is to wear shoes that are well made and fit your feet. This is especially important when you exercise or walk a lot or stand for a long time on hard surfaces. Get new athletic shoes before your old shoes stop supporting and cushioning your feet.   You should also:     Avoid repeated jarring to the heel.     Keep a healthy weight.     Do your leg and foot stretching exercises regularly.   Developed by [employer].   Published by [employer].   Last modified: 2008-08-11   Last reviewed: 2008-07-07   This content is reviewed periodically and is subject to change as new health information becomes available. The information is intended to inform and educate and is not a replacement for medical evaluation, advice, diagnosis or treatment by a healthcare professional.   Sports Medicine Advisor 2009.1 Index  Sports Medicine Advisor  2009.1 Credits     2009 [facility] and/or its affiliates. All Rights Reserved.               Plantar Fasciitis Rehabilitation Exercises   You may begin exercising the muscles of your foot right away by gently stretching them as follows:     Prone hip extension: Lie on your stomach with your legs straight out behind you. Tighten the buttocks and thigh muscles of your injured leg and lift it off the floor about 8 inches. Keep your knee straight. Hold for 5 seconds. Then lower your leg and relax. Do 3 sets of 10.     Towel stretch: Sit on a hard surface with one leg stretched out in front of you. Loop a towel around your toes and the ball of your foot and pull the towel toward your body keeping your knee straight. Hold this position for 15 to 30 seconds then relax. Repeat 3 times.   When the towel stretch becomes too easy, you may begin doing the standing calf stretch.     Standing calf stretch: Facing a wall, put your hands against the wall at about eye level. Keep one leg back with the heel on the floor, and the other leg forward. Turn your back foot slightly inward (as if you were pigeon-toed) as you slowly lean into the wall until you feel a stretch in the back of your calf. Hold for 15 to 30 seconds. Repeat 3 times and then switch the position of your legs and repeat the exercise 3 times. Do this exercise several times each day.     Sitting plantar fascia stretch: Sit in a chair and cross one foot over your other knee. Grab the base of your toes and pull them back toward your leg until you feel a comfortable stretch. Hold 15 seconds and repeat 3 times.   When you can stand comfortably on your injured foot, you can begin standing to stretch the bottom of your foot using the plantar fascia stretch.     Achilles stretch: Stand with the ball of one foot on a stair. Reach for the bottom step with your heel until you feel a stretch in the arch of your foot. Hold this position for 15 to 30 seconds and then relax. Repeat  3 times.   After you have stretched the bottom muscles of your foot, you can begin strengthening the top muscles of your foot.     Frozen can roll: Roll your bare injured foot back and forth from your heel to your mid-arch over a frozen juice can. Repeat for 3 to 5 minutes. This exercise is particularly helpful if done first thing in the morning.     Towel pickup: With your heel on the ground,  a towel with your toes. Release. Repeat 10 to 20 times. When this gets easy, add more resistance by placing a book or small weight on the towel.     Balance and reach exercises   Stand upright next to a chair with your injured leg farthest from the chair. This will provide you with support if you need it. Stand just on the foot of your injured leg. Try to raise the arch of this foot while keeping your toes on the floor.   A. Keep your foot in this position and reach forward in front of you with the hand farthest away from the chair, allowing your knee to bend. Repeat this 10 times while maintaining the arch height. This exercise can be made more difficult by reaching farther in front of you. Do 2 sets.   B.  the same position as above. While maintaining your arch height, reach the hand farthest away from the chair across your body toward the chair. The farther you reach, the more challenging the exercise. Do 2 sets of 10.     Heel raise: Balance yourself while standing behind a chair or counter. Using the chair to help you, raise your body up onto your toes and hold for 5 seconds. Then slowly lower yourself down without holding onto the chair. Hold onto the chair or counter if you need to. When this exercise becomes less painful, try lowering on one leg only. Repeat 10 times. Do 3 sets of 10.     Side-lying leg lift: Lying on your uninjured side, tighten the front thigh muscles on your top leg and lift that leg 8 to 10 inches away from the other leg. Keep the leg straight and lower slowly. Do 3 sets of 10.    Written by Leah Logan, MS, PT, and Roselia Cosme, PT, [facility], [facility], for [email or web address].   Published by [email or web address].   Last modified: 2009-02-09   Last reviewed: 2008-07-07   This content is reviewed periodically and is subject to change as new health information becomes available. The information is intended to inform and educate and is not a replacement for medical evaluation, advice, diagnosis or treatment by a healthcare professional.   Sports Medicine Advisor 2009.1 Index

## 2018-05-03 NOTE — LETTER
[facility]  [address]  [place] [address]  [phone]      May 4, 2018    Veronica Messina                                                                                                                                                       [address] [address]              Dear Veronica,    Here ore the results of your recent labs.                        Sincerely,  Liza Day MD

## 2018-05-03 NOTE — MR AVS SNAPSHOT
After Visit Summary   5/3/2018    Veronica Messina    MRN: [id number]           Patient Information     Date Of Birth          1970        Visit Information        Provider Department      5/3/2018 9:40 AM Liza Day MD [facility]        Today's Diagnoses     Encounter for routine adult health examination with abnormal findings    -  1    Anemia, unspecified type        Screening cholesterol level        Screening for diabetes mellitus        Left foot pain        Encounter for screening mammogram for breast cancer          Care Instructions      Preventive Health Recommendations  Female Ages 40 to 49    Yearly exam:     See your health care provider every year in order to  1. Review health changes.   2. Discuss preventive care.    3. Review your medicines if your doctor prescribed any.      Get a Pap test every three years (unless you have an abnormal result and your provider advises testing more often).      If you get Pap tests with HPV test, you only need to test every 5 years, unless you have an abnormal result. You do not need a Pap test if your uterus was removed (hysterectomy) and you have not had cancer.      You should be tested each year for STDs (sexually transmitted diseases), if you're at risk.       Ask your doctor if you should have a mammogram.      Have a colonoscopy (test for colon cancer) if someone in your family has had colon cancer or polyps before age 50.       Have a cholesterol test every 5 years.       Have a diabetes test (fasting glucose) after age 45. If you are at risk for diabetes, you should have this test every 3 years.    Shots: Get a flu shot each year. Get a tetanus shot every 10 years.     Nutrition:     Eat at least 5 servings of fruits and vegetables each day.    Eat whole-grain bread, whole-wheat pasta and brown rice instead of white grains and rice.    Talk to your provider about Calcium and Vitamin D.     Lifestyle    Exercise at  least 150 minutes a week (an average of 30 minutes a day, 5 days a week). This will help you control your weight and prevent disease.    Limit alcohol to one drink per day.    No smoking.     Wear sunscreen to prevent skin cancer.    See your dentist every six months for an exam and cleaning.    -------------  1. Labs today: cholesterol, blood sugar, liver function, kidney function, hemoglobin  2. Pap due next year  3. Will mail form back to you once labs are completed  4. Schedule appointment with Podiatry  - ice to bottom of food  - continue aleve as needed  - stretches below  - try to wear supportive shoes from the time you get up in the morning  5. Schedule mammogram - [phone]  6. Recommend full skin check with Dermatology next year                           Plantar Fasciitis   What is plantar fasciitis?   Plantar fasciitis is a painful inflammation of the bottom of the foot between the ball of the foot and the heel.   How does it occur?   There are several possible causes of plantar fasciitis, including:     wearing high heels     gaining weight     increased walking, standing, or stair-climbing   If you wear high-heeled shoes, including western-style boots, for long periods of time, the tough, tendonlike tissue of the bottom of your foot can become shorter. This layer of tissue is called fascia. Pain occurs when you stretch fascia that has shortened. This painful stretching might happen, for example, when you walk barefoot after getting out of bed in the morning.   If you gain weight, you might be more likely to have plantar fasciitis, especially if you walk a lot or  shoes with poor heel cushioning. Normally there is a pad of fatty tissue under your heel bone. Weight gain might break down this fat pad and cause heel pain.   Runners may get plantar fasciitis when they change their workout and increase their mileage or frequency of workouts. It can also occur with a change in exercise surface or  terrain, or if your shoes are worn out and don't provide enough cushion for your heels.   If the arches of your foot are abnormally high or low, you are more likely to develop plantar fasciitis than if your arches are normal.   What are the symptoms?   The main symptom of plantar fasciitis is heel pain when you walk. You may also feel pain when you stand and possibly even when you are resting. This pain typically occurs first thing in the morning after you get out of bed, when your foot is placed flat on the floor. The pain occurs because you are stretching the plantar fascia. The pain usually lessens with more walking, but you may have it again after periods of rest.   You may feel no pain when you are sleeping because the position of your feet during rest allows the fascia to shorten and relax.   How is it diagnosed?   Your healthcare provider will ask about your symptoms. He or she will ask if the bottom of your heel is tender and if you have pain when you stretch the bottom of your foot. An X-ray of your heel may be done.   How is it treated?     Give your painful heel lots of rest. You may need to stay completely off your foot for several days when the pain is severe.     Your healthcare provider may recommend or prescribe anti-inflammatory medicines, such as aspirin or ibuprofen. These drugs decrease pain and inflammation. Adults aged 65 years and older should not take non-steroidal anti-inflammatory medicine for more than 7 days without their healthcare provider's approval. Resting your heel on an ice pack for a few minutes several times a day can also help.     Try to cushion your foot. You can do this by wearing athletic shoes, even at work, for awhile. Heel cushions can also be used. The cushions should be worn in both shoes. They are most helpful if you are overweight or an older adult.     Your provider may recommend special arch supports or inserts for your shoes called orthotics, either custom-made or  off the shelf. These supports can be particularly helpful if you have flat feet or high arches.     Your provider may recommend an injection of a cortisone-like medicine.     Lose weight if needed.     A night splint may be recommended. This will keep the plantar fascia stretched while you are sleeping.     Physical therapy for additional treatments may be recommended     Surgery is rarely needed.   How long will the effects last?   You may find that the pain is sometimes worse and sometimes better over time. If you get treatment soon after you notice the pain, the symptoms should stop after several weeks. If, however, you have had plantar fasciitis for a long time, it may take many weeks to months for the pain to go away.   When can I return to my normal activities?   Everyone recovers from an injury at a different rate. Return to your activities will be determined by how soon your foot recovers, not by how many days or weeks it has been since your injury has occurred. In general, the longer you have symptoms before you start treatment, the longer it will take to get better. The goal of rehabilitation is to return you to your normal activities as soon as is safely possible. If you return too soon you may worsen your injury.   You may safely return to your activities when, starting from the top of the list and progressing to the end, each of the following is true:     You have full range of motion in the injured foot compared with the uninjured foot.     You have full strength of the injured foot compared with the uninjured foot.     You can walk straight ahead without significant pain or limping.   How can I prevent plantar fasciitis?   The best way to prevent plantar fasciitis is to wear shoes that are well made and fit your feet. This is especially important when you exercise or walk a lot or stand for a long time on hard surfaces. Get new athletic shoes before your old shoes stop supporting and cushioning your  feet.   You should also:     Avoid repeated jarring to the heel.     Keep a healthy weight.     Do your leg and foot stretching exercises regularly.   Developed by [employer].   Published by [employer].   Last modified: 2008-08-11   Last reviewed: 2008-07-07   This content is reviewed periodically and is subject to change as new health information becomes available. The information is intended to inform and educate and is not a replacement for medical evaluation, advice, diagnosis or treatment by a healthcare professional.   Sports Medicine Advisor 2009.1 Index  Sports Medicine Advisor 2009.1 Credits     2009 [facility] and/or its affiliates. All Rights Reserved.               Plantar Fasciitis Rehabilitation Exercises   You may begin exercising the muscles of your foot right away by gently stretching them as follows:     Prone hip extension: Lie on your stomach with your legs straight out behind you. Tighten the buttocks and thigh muscles of your injured leg and lift it off the floor about 8 inches. Keep your knee straight. Hold for 5 seconds. Then lower your leg and relax. Do 3 sets of 10.     Towel stretch: Sit on a hard surface with one leg stretched out in front of you. Loop a towel around your toes and the ball of your foot and pull the towel toward your body keeping your knee straight. Hold this position for 15 to 30 seconds then relax. Repeat 3 times.   When the towel stretch becomes too easy, you may begin doing the standing calf stretch.     Standing calf stretch: Facing a wall, put your hands against the wall at about eye level. Keep one leg back with the heel on the floor, and the other leg forward. Turn your back foot slightly inward (as if you were pigeon-toed) as you slowly lean into the wall until you feel a stretch in the back of your calf. Hold for 15 to 30 seconds. Repeat 3 times and then switch the position of your legs and repeat the exercise 3 times. Do this exercise several times each day.      Sitting plantar fascia stretch: Sit in a chair and cross one foot over your other knee. Grab the base of your toes and pull them back toward your leg until you feel a comfortable stretch. Hold 15 seconds and repeat 3 times.   When you can stand comfortably on your injured foot, you can begin standing to stretch the bottom of your foot using the plantar fascia stretch.     Achilles stretch: Stand with the ball of one foot on a stair. Reach for the bottom step with your heel until you feel a stretch in the arch of your foot. Hold this position for 15 to 30 seconds and then relax. Repeat 3 times.   After you have stretched the bottom muscles of your foot, you can begin strengthening the top muscles of your foot.     Frozen can roll: Roll your bare injured foot back and forth from your heel to your mid-arch over a frozen juice can. Repeat for 3 to 5 minutes. This exercise is particularly helpful if done first thing in the morning.     Towel pickup: With your heel on the ground,  a towel with your toes. Release. Repeat 10 to 20 times. When this gets easy, add more resistance by placing a book or small weight on the towel.     Balance and reach exercises   Stand upright next to a chair with your injured leg farthest from the chair. This will provide you with support if you need it. Stand just on the foot of your injured leg. Try to raise the arch of this foot while keeping your toes on the floor.   A. Keep your foot in this position and reach forward in front of you with the hand farthest away from the chair, allowing your knee to bend. Repeat this 10 times while maintaining the arch height. This exercise can be made more difficult by reaching farther in front of you. Do 2 sets.   B.  the same position as above. While maintaining your arch height, reach the hand farthest away from the chair across your body toward the chair. The farther you reach, the more challenging the exercise. Do 2 sets of 10.      Heel raise: Balance yourself while standing behind a chair or counter. Using the chair to help you, raise your body up onto your toes and hold for 5 seconds. Then slowly lower yourself down without holding onto the chair. Hold onto the chair or counter if you need to. When this exercise becomes less painful, try lowering on one leg only. Repeat 10 times. Do 3 sets of 10.     Side-lying leg lift: Lying on your uninjured side, tighten the front thigh muscles on your top leg and lift that leg 8 to 10 inches away from the other leg. Keep the leg straight and lower slowly. Do 3 sets of 10.   Written by Leah Logan, MS, PT, and Roselia Cosme PT, [facility], for [employer].   Published by [employer].   Last modified: 2009-02-09   Last reviewed: 2008-07-07   This content is reviewed periodically and is subject to change as new health information becomes available. The information is intended to inform and educate and is not a replacement for medical evaluation, advice, diagnosis or treatment by a healthcare professional.   Sports Medicine Advisor 2009.1 Index                        Follow-ups after your visit        Additional Services     PODIATRY/FOOT & ANKLE SURGERY REFERRAL       Your provider has referred you to: [facility] [place] [facility] [place] [phone]   [email or web address]    Please be aware that coverage of these services is subject to the terms and limitations of your health insurance plan.  Call member services at your health plan with any benefit or coverage questions.      Please bring the following to your appointment:  >>   Any x-rays, CTs or MRIs which have been performed.  Contact the facility where they were done to arrange for  prior to your scheduled appointment.    >>   List of current medications   >>   This referral request   >>   Any documents/labs given to you for this referral                  Follow-up notes from your care team     Return in about 1 year  "(around 5/3/2019) for Physical Exam.      Future tests that were ordered for you today     Open Future Orders        Priority Expected Expires Ordered    MA Screening Digital Bilateral Routine  5/3/2019 5/3/2018            Who to contact     If you have questions or need follow up information about today's clinic visit or your schedule please contact [facility] directly at [phone].  Normal or non-critical lab and imaging results will be communicated to you by [employer], letter or phone within 4 business days after the clinic has received the results. If you do not hear from us within 7 days, please contact the clinic through [facility] or phone. If you have a critical or abnormal lab result, we will notify you by phone as soon as possible.  Submit refill requests through [employer] or call your pharmacy and they will forward the refill request to us. Please allow 3 business days for your refill to be completed.          Additional Information About Your Visit        [employer] Information     [employer] gives you secure access to your electronic health record. If you see a primary care provider, you can also send messages to your care team and make appointments. If you have questions, please call your primary care clinic.  If you do not have a primary care provider, please call [phone] and they will assist you.        Care EveryWhere ID     This is your Care EveryWhere ID. This could be used by other organizations to access your [facility] medical records  [id number]        Your Vitals Were     Pulse Temperature Height Last Period Pulse Oximetry BMI (Body Mass Index)    58 97.4  F (36.3  C) (Tympanic) 5' 6\" (1.676 m) 04/10/2018 (Approximate) 99% 37.06 kg/m2       Blood Pressure from Last 3 Encounters:   05/03/18 128/66   07/21/17 110/72    Weight from Last 3 Encounters:   05/03/18 229 lb 9.6 oz (104.1 kg)   07/21/17 232 lb 3.2 oz (105.3 kg)              We Performed the Following     Comprehensive metabolic " panel     Hemoglobin     Lipid panel reflex to direct LDL Fasting     PODIATRY/FOOT & ANKLE SURGERY REFERRAL        Primary Care Provider Office Phone # Fax #    Liza Day [phone] [address] [place] [address]        Equal Access to Services     [facility]: Hadii aad ku hadasho Soomaali, waaxda luqadaha, qaybta kaalmada adeegyada, waxay idiin hayaan adeeg khsilviash lamigdalia . So [facility] [phone].    ATENCIÓN: Si habla español, tiene a feldman disposición servicios gratuitos de asistencia lingüística. Llame al [phone].    We comply with applicable federal civil rights laws and [place] laws. We do not discriminate on the basis of race, color, national origin, age, disability, sex, sexual orientation, or gender identity.            Thank you!     Thank you for choosing [facility]  for your care. Our goal is always to provide you with excellent care. Hearing back from our patients is one way we can continue to improve our services. Please take a few minutes to complete the written survey that you may receive in the mail after your visit with us. Thank you!             Your Updated Medication List - Protect others around you: Learn how to safely use, store and throw away your medicines at www.disposemymeds.org.      Notice  As of 5/3/2018 10:32 AM    You have not been prescribed any medications.

## 2018-05-04 ENCOUNTER — TELEPHONE (OUTPATIENT)
Dept: PEDIATRICS | Facility: CLINIC | Age: 48
End: 2018-05-04

## 2018-05-04 LAB
ALBUMIN SERPL-MCNC: 3.9 G/DL (ref 3.4–5)
ALP SERPL-CCNC: 60 U/L (ref 40–150)
ALT SERPL W P-5'-P-CCNC: 19 U/L (ref 0–50)
ANION GAP SERPL CALCULATED.3IONS-SCNC: 5 MMOL/L (ref 3–14)
AST SERPL W P-5'-P-CCNC: 18 U/L (ref 0–45)
BILIRUB SERPL-MCNC: 0.3 MG/DL (ref 0.2–1.3)
BUN SERPL-MCNC: 8 MG/DL (ref 7–30)
CALCIUM SERPL-MCNC: 9.1 MG/DL (ref 8.5–10.1)
CHLORIDE SERPL-SCNC: 108 MMOL/L (ref 94–109)
CHOLEST SERPL-MCNC: 171 MG/DL
CO2 SERPL-SCNC: 28 MMOL/L (ref 20–32)
CREAT SERPL-MCNC: 0.63 MG/DL (ref 0.52–1.04)
GFR SERPL CREATININE-BSD FRML MDRD: >90 ML/MIN/1.7M2
GLUCOSE SERPL-MCNC: 89 MG/DL (ref 70–99)
HDLC SERPL-MCNC: 66 MG/DL
LDLC SERPL CALC-MCNC: 92 MG/DL
NONHDLC SERPL-MCNC: 105 MG/DL
POTASSIUM SERPL-SCNC: 4.1 MMOL/L (ref 3.4–5.3)
PROT SERPL-MCNC: 7.6 G/DL (ref 6.8–8.8)
SODIUM SERPL-SCNC: 141 MMOL/L (ref 133–144)
TRIGL SERPL-MCNC: 66 MG/DL

## 2018-05-07 ENCOUNTER — OFFICE VISIT (OUTPATIENT)
Dept: PODIATRY | Facility: CLINIC | Age: 48
End: 2018-05-07
Payer: COMMERCIAL

## 2018-05-07 VITALS
HEIGHT: 66 IN | BODY MASS INDEX: 36.9 KG/M2 | WEIGHT: 229.6 LBS | RESPIRATION RATE: 12 BRPM | DIASTOLIC BLOOD PRESSURE: 70 MMHG | SYSTOLIC BLOOD PRESSURE: 124 MMHG

## 2018-05-07 DIAGNOSIS — M72.2 PLANTAR FASCIAL FIBROMATOSIS: Primary | ICD-10-CM

## 2018-05-07 DIAGNOSIS — G57.92 NEURITIS OF LEFT HEEL: ICD-10-CM

## 2018-05-07 PROCEDURE — 99203 OFFICE O/P NEW LOW 30 MIN: CPT | Performed by: PODIATRIST

## 2018-05-07 NOTE — PATIENT INSTRUCTIONS
Thank you for choosing [facility] / [facility]!    DR. GONZALEZ'S CLINIC LOCATIONS:   MONDAY - [place] TUESDAY - [place]   [address] [facility]  [address]   [place] [address] [place] [address]   [phone]       THURSDAY AM - [place] THURSDAY PM - [facility]   [address]   [place] [address] [place] [address]   [phone]       FRIDAY AM - [place] SET UP SURGERY: [phone] [address] APPOINTMENTS: [phone]   [place] [address] BILLING QUESTIONS: [phone] FAX NUMBER: [phone]     Follow Up: 4 wks    NEURITIS  Neuritis is more of a symptom rather than a condition. Basically it's inflammation of a peripheral nerve, often accompanied by degenerative changes in the nervous tissue. The effects are usually a tingling, burning, pin-and-needle sensation, or even a loss of sensation much like a peripheral neuropathy. This can also be due to back injury or arthritis.    The treatment can be directed at the underlying cause. There are many possible causes. The cause can be mechanical by injury or pressure to the nerve or it can be vascular by occlusion of the vessels that supply blood to the nerve. It can be infectious and caused by invading microorganisms, or metabolic by a deficiencies in vitamins or pernicious anemia.      Treatment is normally targeted at the symptoms for pain. Using lidocaine cream to numb the area, physical therapy, compounding cream, or injection. Also talking with your primary care about possible medication directed at nerves such as lyrica, neurontin, gabapentin, amytriptylline, or duloxitine.      PLANTAR FASCIITIS    Plantar fasciitis is often referred to as heel spurs or heel pain. Plantar fasciitis is a very common problem that affects people of all foot shapes, age, weight and activity level. Pain may be in the arch or on the weight-bearing surface of  the heel. The pain may come on without injury or identifiable cause. Pain is generally present when first getting out of bed in the morning or up from a seated break.     CAUSES  The plantar fascia is a dense fibrous band of tissue that stretches across the bottom surface of the foot. The fascia helps support the foot muscles and arch. Plantar fasciitis is thought to be caused by mechanical strain or overload. Frequent walking without shoes or wearing unsupportive shoes is thought to cause structural overload and ultimately inflammation of the plantar fascia. Some people have heel spurs that can be seen on x-ray. The heel spur is actually a minor component of plantar fascitis and is largely ignored.       SELF TREATMENT   The easiest solution is to stop walking around your home without shoes. Plantar fasciitis is largely a shoe problem. Shoes are either not being worn often enough or your current shoes are inadequate for your weight, foot structure or activity level. The majority of shoes on the market today are not sufficient to resist development of plantar fasciitis or to promote healing. Assume that your current shoes are inadequate and will need to be replaced. Even high quality shoes wear out with 6 months to one year of frequent use. Weight loss is another option. Losing ten pounds in the next two months may be enough to resolve the problem. Ice applied to the area of pain two to three times per day for ten minutes each session can be very helpful. This should continue until the problem resolves. Achilles tendon stretching is essential. Stretch multiple times daily to promote healing and to prevent recurrence in the future.     MEDICAL TREATMENT  Medical treatments often include custom arch supports, cortisone injections, physical therapy, splints to be worn in bed, prescription medications and surgery. The home treatments listed above will be necessary regardless of these advanced medical treatments. Surgery  is rarely needed but is very helpful in selected cases.     PROGNOSIS  Plantar fasciitis can last from one day to a lifetime. Some people get intermittent fascitis that is very short-lived. Others suffer daily for years. Excessive body weight, frequent bare foot walking, long hours on the feet, inadequate shoes, predisposing foot structures and excessive activity such as running are all potential issues that lead to chronic and/or recurring plantar fascitis. Having plantar fasciitis means that you are forever prone to this problem and will require modification of some of the above factors. Most people seek treatment within one to four months. Healing usually requires a similar one to four month time frame. Healing time is relative to the amount of effort spent treating the problem.   Plantar fasciitis is highly recurrent. Risk factors often continue, including return to bare foot walking, inadequate shoes, excessive body weight, excessive activities, etc. Your life style and foot structure may predispose you to recurrent plantar fasciitis. A daily prevention regimen can be very helpful. Ongoing use of shoe inserts, careful attention to appropriate shoes, daily Achilles stretching, etc. may prevent recurrence. Prompt attention at the earliest warning signs of heel pain can resolve the problem in as short as a few days.     EXERCISES    Stair Exercise: Step on the stairs with the ball of your foot and hold your position for at least 15 seconds, then slowly step down with the heels of your foot. You can do this daily and as often as you want.   Picking the Towel: Sit comfortably and then pick the towel up with your toes. You can use any object other than a towel as long as the material can be soft and you can pick it up with your toes.  Rolling the Bottle: Use a small ball or frozen water bottle and then roll it around with your foot.   Flex the Toes: Sit comfortably and then flex your toes by pointing it towards the  floor or towards your body. This will relax and flex your foot and exercise your plantar fascia, the calf, and the Achilles tendon. The inability of the foot to stretch often causes the bunching up of the plantar fascia area leading to the pain.  Calf/Achilles Stretching: Lay on you back and raise one foot, then point your toes towards the floor. See photo below:               Hold each stretch for 10 seconds. Stretch 10 times per set, three sets per day. Morning, afternoon and evening. If your heel pain is very severe in the morning, consider doing the first set of stretches before you get out of bed.    THERAPIES COVERED:  1.  Supportive Shoes: minimizing barefoot ambulation helps to provide cushion, padding and support to the ligament that is inflamed. Socks, flip flops, flats and some slippers are not typically sufficient to provide support. Shoes should be worn even indoors  2.  Insert/Orthotics: ones with an arch support built in to them provide further stress relief for the ligament. See the information below on recommended inserts.  3. Icing: using a frozen water bottle or orange, and rolling it along the bottom of the arch/heel can help to alleviate discomfort, and can act as a tissue massage to the painful, inflamed ligament.  There is evidence that shows icing at least three times daily can be beneficial  4.  Antiinflammatory (NSAID): Ibuprofen, Aleve, as well as Tylenol can be used to help decrease symptoms and improve pain levels. If you have high blood pressure, heart disease, stomach or kidney problems, use antiinflammatories sparingly. Tylenol should not be used if you have liver problems.   5. Activity Modifications: if there are certain things that you do, whether it's going barefoot or certain shoes/activities, you should try to minimize those activities as much as possible until your symptoms are sufficiently resolved. Certainly, some activities, such as running on the treadmill, are easier to  take a break from versus others, such as work or chores at home. If there are certain activities that hurt your heel, and you keep doing those activities that hurt your heel, your heel will keep hurting.  **If these initial therapies are insufficient, we have our tier 2 therapies that can more aggressively work to improve your symptoms and get you back to the activities that you enjoy!    OVER THE COUNTER INSERTS    SuperFeet   Sofsole Fit Spenco   Power Step   Walk-Fit Arch Cradles     Most of these can be found at your local [employer], [employer], or online.  **A good high quality over the counter insert should cost around $40-$50      [facility] LOCATIONS    [place]  [address] [place] [address]  [phone]   [address]  [phone] [place]  [address] [facility]  [phone]   [place]  [address] [facility] [address]  [phone]   [place]  [address]  [phone] [place] [address]  [phone]   [place]  [address]  [phone]   [place]  [address] [place] [address]  [phone] [place]  [address] [facility] [phone]           Body Mass Index (BMI)  Many things can cause foot and ankle problems. Foot structure, activity level, foot mechanics and injuries are common causes of pain. One very important issue that often goes unmentioned, is body weight. Extra weight can cause increased stress on muscles, ligaments, bones and tendons. Sometimes just a few extra pounds is all it takes to put one over her/his threshold. Without reducing that stress, it can be difficult to alleviate pain. Some people are uncomfortable addressing this issue, but we feel it is important for you to think about it. As Foot &  Ankle specialists, our job is addressing the lower extremity problem and possible causes. Regarding extra body weight, we encourage patients to discuss diet and weight management plans with their primary care doctors. It is this team  approach that gives you the best opportunity for pain relief and getting you back on your feet.

## 2018-05-07 NOTE — PROGRESS NOTES
"Foot & Ankle Surgery  May 7, 2018    CC: \"chronic and constant pain w/ left foot\"    I was asked to see Veronica Messina regarding the chief complaint by:  Dr. MAGDALENO Day    HPI:  Pt is a 48 year old female who presents with above complaint.  L foot pain x 4 months, points to heel.  Very tender in the morning, can be the bottom of the heel and/or the back of the heel.  Describes burning, deep ache, throbbing, shooting pain.  6/10 \"regularly throughout the day\".  Worse with sitting.  She has tried Aleve, rub/massage.  No injury noted but she also has significant post-static dyskinesia.      ROS:   Pos for CC.  The patient denies current nausea, vomiting, chills, fevers, belly pain, calf pain, chest pain or SOB.  Complete remainder of ROS is otherwise neg.    VITALS:    Vitals:    05/07/18 1104   Resp: 12   Weight: 229 lb 9.6 oz (104.1 kg)   Height: 5' 6\" (1.676 m)       PMH:    Past Medical History:   Diagnosis Date     Infertility, female      Migraine        SXHX:    Past Surgical History:   Procedure Laterality Date     BUNIONECTOMY Left      BUNIONECTOMY Right      HC REPAIR OF HAMMERTOE,ONE Right      HYSTEROSCOPY       TONSILLECTOMY  1977        MEDS:    No current outpatient prescriptions on file.     No current facility-administered medications for this visit.        ALL:   No Known Allergies    FMH:    Family History   Problem Relation Age of Onset     Hypertension Mother      DIABETES Mother      Prostate Problems Father      Arrhythmia Brother      WPW     Obesity Brother      Thyroid Disease Maternal Grandmother      Dementia Maternal Grandmother      DIABETES Maternal Grandfather      KIDNEY DISEASE Maternal Grandfather      HEART DISEASE Maternal Grandfather      HEART DISEASE Paternal Grandmother      CANCER Paternal Grandfather      throat     Lung Cancer Paternal Grandfather        SocHx:    Social History     Social History     Marital status:      Spouse name: N/A     Number of children: N/A "     Years of education: N/A     Occupational History     Not on file.     Social History Main Topics     Smoking status: Never Smoker     Smokeless tobacco: Never Used     Alcohol use No      Comment: occ     Drug use: No     Sexual activity: Yes     Partners: Male     Other Topics Concern     Not on file     Social History Narrative           EXAMINATION:  Gen:   No apparent distress  Neuro:   A&Ox3, no deficits  Psych:    Answering questions appropriately for age and situation with normal affect  Head:    NCAT  Eye:    Visual scanning without deficit  Ear:    Response to auditory stimuli wnl  Lung:    Non-labored breathing on RA noted  Abd:    NTND per patient report  Lymph:    Neg for pitting/non-pitting edema BLE  Vasc:    Pulses palpable, CFT minimally delayed  Neuro:    Light touch sensation intact to all sensory nerve distributions without paresthesias  Derm:    Neg for nodules, lesions or ulcerations  MSK:    Left lower extremity - plantar heel is very tender, as is the soft tissue just distal to the WB surface.  Tenderness is also noted along ICN.  No 4th interspace pain.  Rearfoot/ankle otherwise neg.    Calf:    Neg for redness, swelling or tenderness    Assessment:  48 year old female with plantar fasciitis left lower extremity with possible Morocho's neuritis       Plan:  Discussed etiologies, anatomy and options  1.  Plantar fasciitis left lower extremity with possible Morocho's neuritis   -Regarding the plantar fasciitis, the Plantar Fasciitis handout was dispensed and discussed.  We talked about stretching, resting/activity modification, icing, NSAID/tylenol use as tolerated, inserts, supportive shoes and minimizing shoeless walking.    -discussed Achilles, plantar fascial and hamstring stretches  -OTC insert information dispensed and discussed   -consider diagnostic/therapeutic ICN block should symptoms fail to respond.     Follow up:  4 weeks or sooner with acute issues      Patient's medical history  was reviewed today    Body mass index is 37.06 kg/(m^2).  Weight management plan: Patient was referred to their PCP to discuss a diet and exercise plan.        Kam Thurston DPM   Podiatric Foot & Ankle Surgeon  [facility]  [phone]

## 2018-05-07 NOTE — LETTER
"    5/7/2018         RE: Veronica Messina  [address]  [place] [address]        Dear Colleague,    Thank you for referring your patient, Veronica Messina, to the [facility] [place]. Please see a copy of my visit note below.    Foot & Ankle Surgery  May 7, 2018    CC: \"chronic and constant pain w/ left foot\"    I was asked to see Veronica Messina regarding the chief complaint by:  Dr. SANFORD. Serum    HPI:  Pt is a 48 year old female who presents with above complaint.  L foot pain x 4 months, points to heel.  Very tender in the morning, can be the bottom of the heel and/or the back of the heel.  Describes burning, deep ache, throbbing, shooting pain.  6/10 \"regularly throughout the day\".  Worse with sitting.  She has tried Aleve, rub/massage.  No injury noted but she also has significant post-static dyskinesia.      ROS:   Pos for CC.  The patient denies current nausea, vomiting, chills, fevers, belly pain, calf pain, chest pain or SOB.  Complete remainder of ROS is otherwise neg.    VITALS:    Vitals:    05/07/18 1104   Resp: 12   Weight: 229 lb 9.6 oz (104.1 kg)   Height: 5' 6\" (1.676 m)       PMH:    Past Medical History:   Diagnosis Date     Infertility, female      Migraine        SXHX:    Past Surgical History:   Procedure Laterality Date     BUNIONECTOMY Left      BUNIONECTOMY Right      HC REPAIR OF H[place]MERTOE,ONE Right      HYSTEROSCOPY       TONSILLECTOMY  1977        MEDS:    No current outpatient prescriptions on file.     No current facility-administered medications for this visit.        ALL:   No Known Allergies    FMH:    Family History   Problem Relation Age of Onset     Hypertension Mother      DIABETES Mother      Prostate Problems Father      Arrhythmia Brother      WPW     Obesity Brother      Thyroid Disease Maternal Grandmother      Dementia Maternal Grandmother      DIABETES Maternal Grandfather      KIDNEY DISEASE Maternal Grandfather      HEART DISEASE Maternal Grandfather      HEART " DISEASE Paternal Grandmother      CANCER Paternal Grandfather      throat     Lung Cancer Paternal Grandfather        SocHx:    Social History     Social History     Marital status:      Spouse name: N/A     Number of children: N/A     Years of education: N/A     Occupational History     Not on file.     Social History Main Topics     Smoking status: Never Smoker     Smokeless tobacco: Never Used     Alcohol use No      Comment: occ     Drug use: No     Sexual activity: Yes     Partners: Male     Other Topics Concern     Not on file     Social History Narrative           EXAMINATION:  Gen:   No apparent distress  Neuro:   A&Ox3, no deficits  Psych:    Answering questions appropriately for age and situation with normal affect  Head:    NCAT  Eye:    Visual scanning without deficit  Ear:    Response to auditory stimuli wnl  Lung:    Non-labored breathing on RA noted  Abd:    NTND per patient report  Lymph:    Neg for pitting/non-pitting edema BLE  Vasc:    Pulses palpable, CFT minimally delayed  Neuro:    Light touch sensation intact to all sensory nerve distributions without paresthesias  Derm:    Neg for nodules, lesions or ulcerations  MSK:    Left lower extremity - plantar heel is very tender, as is the soft tissue just distal to the WB surface.  Tenderness is also noted along ICN.  No 4th interspace pain.  Rearfoot/ankle otherwise neg.    Calf:    Neg for redness, swelling or tenderness    Assessment:  48 year old female with plantar fasciitis left lower extremity with possible Morocho's neuritis       Plan:  Discussed etiologies, anatomy and options  1.  Plantar fasciitis left lower extremity with possible Morocho's neuritis   -Regarding the plantar fasciitis, the Plantar Fasciitis handout was dispensed and discussed.  We talked about stretching, resting/activity modification, icing, NSAID/tylenol use as tolerated, inserts, supportive shoes and minimizing shoeless walking.    -discussed Achilles, plantar  fascial and hamstring stretches  -OTC insert information dispensed and discussed   -consider diagnostic/therapeutic ICN block should symptoms fail to respond.     Follow up:  4 weeks or sooner with acute issues      Patient's medical history was reviewed today    Body mass index is 37.06 kg/(m^2).  Weight management plan: Patient was referred to their PCP to discuss a diet and exercise plan.        Kam Thurston DPM   Podiatric Foot & Ankle Surgeon  [facility]  [phone]      Again, thank you for allowing me to participate in the care of your patient.        Sincerely,        Kam Thurston DPM, DPM

## 2018-05-07 NOTE — MR AVS SNAPSHOT
After Visit Summary   5/7/2018    Veronica Messina    MRN: [id number]           Patient Information     Date Of Birth          1970        Visit Information        Provider Department      5/7/2018 11:00 AM Kam Thurston DPM [facility]        Care Instructions    Thank you for choosing [facility]!    DR. THURSTON'S CLINIC LOCATIONS:   MONDAY - [place] TUESDAY - [place]   [address] [place] [address]  [address]   [place] [address] [place] [address]   [phone]       THURSDAY AM - [place] THURSDAY PM - [place]   [address]   [place] [address] [place], [place] [address] [phone]       FRIDAY AM - [place] SET UP SURGERY: [phone] [address] APPOINTMENTS: [phone]   [place] [address] BILLING QUESTIONS: [phone] FAX NUMBER: [phone]     Follow Up: 4 wks    NEURITIS  Neuritis is more of a symptom rather than a condition. Basically it's inflammation of a peripheral nerve, often accompanied by degenerative changes in the nervous tissue. The effects are usually a tingling, burning, pin-and-needle sensation, or even a loss of sensation much like a peripheral neuropathy. This can also be due to back injury or arthritis.    The treatment can be directed at the underlying cause. There are many possible causes. The cause can be mechanical by injury or pressure to the nerve or it can be vascular by occlusion of the vessels that supply blood to the nerve. It can be infectious and caused by invading microorganisms, or metabolic by a deficiencies in vitamins or pernicious anemia.      Treatment is normally targeted at the symptoms for pain. Using lidocaine cream to numb the area, physical therapy, compounding cream, or injection. Also talking with your primary care about possible medication directed at nerves such as lyrica, neurontin,  gabapentin, amytriptylline, or duloxitine.      PLANTAR FASCIITIS    Plantar fasciitis is often referred to as heel spurs or heel pain. Plantar fasciitis is a very common problem that affects people of all foot shapes, age, weight and activity level. Pain may be in the arch or on the weight-bearing surface of the heel. The pain may come on without injury or identifiable cause. Pain is generally present when first getting out of bed in the morning or up from a seated break.     CAUSES  The plantar fascia is a dense fibrous band of tissue that stretches across the bottom surface of the foot. The fascia helps support the foot muscles and arch. Plantar fasciitis is thought to be caused by mechanical strain or overload. Frequent walking without shoes or wearing unsupportive shoes is thought to cause structural overload and ultimately inflammation of the plantar fascia. Some people have heel spurs that can be seen on x-ray. The heel spur is actually a minor component of plantar fascitis and is largely ignored.       SELF TREATMENT   The easiest solution is to stop walking around your home without shoes. Plantar fasciitis is largely a shoe problem. Shoes are either not being worn often enough or your current shoes are inadequate for your weight, foot structure or activity level. The majority of shoes on the market today are not sufficient to resist development of plantar fasciitis or to promote healing. Assume that your current shoes are inadequate and will need to be replaced. Even high quality shoes wear out with 6 months to one year of frequent use. Weight loss is another option. Losing ten pounds in the next two months may be enough to resolve the problem. Ice applied to the area of pain two to three times per day for ten minutes each session can be very helpful. This should continue until the problem resolves. Achilles tendon stretching is essential. Stretch multiple times daily to promote healing and to prevent  recurrence in the future.     MEDICAL TREATMENT  Medical treatments often include custom arch supports, cortisone injections, physical therapy, splints to be worn in bed, prescription medications and surgery. The home treatments listed above will be necessary regardless of these advanced medical treatments. Surgery is rarely needed but is very helpful in selected cases.     PROGNOSIS  Plantar fasciitis can last from one day to a lifetime. Some people get intermittent fascitis that is very short-lived. Others suffer daily for years. Excessive body weight, frequent bare foot walking, long hours on the feet, inadequate shoes, predisposing foot structures and excessive activity such as running are all potential issues that lead to chronic and/or recurring plantar fascitis. Having plantar fasciitis means that you are forever prone to this problem and will require modification of some of the above factors. Most people seek treatment within one to four months. Healing usually requires a similar one to four month time frame. Healing time is relative to the amount of effort spent treating the problem.   Plantar fasciitis is highly recurrent. Risk factors often continue, including return to bare foot walking, inadequate shoes, excessive body weight, excessive activities, etc. Your life style and foot structure may predispose you to recurrent plantar fasciitis. A daily prevention regimen can be very helpful. Ongoing use of shoe inserts, careful attention to appropriate shoes, daily Achilles stretching, etc. may prevent recurrence. Prompt attention at the earliest warning signs of heel pain can resolve the problem in as short as a few days.     EXERCISES    Stair Exercise: Step on the stairs with the ball of your foot and hold your position for at least 15 seconds, then slowly step down with the heels of your foot. You can do this daily and as often as you want.   Picking the Towel: Sit comfortably and then pick the towel up  with your toes. You can use any object other than a towel as long as the material can be soft and you can pick it up with your toes.  Rolling the Bottle: Use a small ball or frozen water bottle and then roll it around with your foot.   Flex the Toes: Sit comfortably and then flex your toes by pointing it towards the floor or towards your body. This will relax and flex your foot and exercise your plantar fascia, the calf, and the Achilles tendon. The inability of the foot to stretch often causes the bunching up of the plantar fascia area leading to the pain.  Calf/Achilles Stretching: Lay on you back and raise one foot, then point your toes towards the floor. See photo below:               Hold each stretch for 10 seconds. Stretch 10 times per set, three sets per day. Morning, afternoon and evening. If your heel pain is very severe in the morning, consider doing the first set of stretches before you get out of bed.    THERAPIES COVERED:  1.  Supportive Shoes: minimizing barefoot ambulation helps to provide cushion, padding and support to the ligament that is inflamed. Socks, flip flops, flats and some slippers are not typically sufficient to provide support. Shoes should be worn even indoors  2.  Insert/Orthotics: ones with an arch support built in to them provide further stress relief for the ligament. See the information below on recommended inserts.  3. Icing: using a frozen water bottle or orange, and rolling it along the bottom of the arch/heel can help to alleviate discomfort, and can act as a tissue massage to the painful, inflamed ligament.  There is evidence that shows icing at least three times daily can be beneficial  4.  Antiinflammatory (NSAID): Ibuprofen, Aleve, as well as Tylenol can be used to help decrease symptoms and improve pain levels. If you have high blood pressure, heart disease, stomach or kidney problems, use antiinflammatories sparingly. Tylenol should not be used if you have liver  problems.   5. Activity Modifications: if there are certain things that you do, whether it's going barefoot or certain shoes/activities, you should try to minimize those activities as much as possible until your symptoms are sufficiently resolved. Certainly, some activities, such as running on the treadmill, are easier to take a break from versus others, such as work or chores at home. If there are certain activities that hurt your heel, and you keep doing those activities that hurt your heel, your heel will keep hurting.  **If these initial therapies are insufficient, we have our tier 2 therapies that can more aggressively work to improve your symptoms and get you back to the activities that you enjoy!    OVER THE COUNTER INSERTS    SuperFeet   Sofsole Fit Spenco   Power Step   Walk-Fit Arch Cradles     Most of these can be found at your local [employer], sporting [employer] stores, or online.  **A good high quality over the counter insert should cost around $40-$50      RuckPackES LOCATIONS    [place]  [address] [place] [address]  [phone]   [address]  [phone] [place]  [address] [facility]  [phone]   [place]  [address] [facility] [address]  [phone]   [place]  [address] [facility]  [phone] [place] [address]  [phone]   [place]  [address] [facility]  [phone]   [place]  [address] [facility]  [phone] [place]  [address] [facility] [phone]           Body Mass Index (BMI)  Many things can cause foot and ankle problems. Foot structure, activity level, foot mechanics and injuries are common causes of pain. One very important issue that often goes unmentioned, is body weight. Extra weight can cause increased stress on muscles, ligaments, bones and tendons. Sometimes just a few extra pounds is all it takes to put one over her/his threshold. Without reducing that stress, it can be difficult to alleviate pain. Some people are  "uncomfortable addressing this issue, but we feel it is important for you to think about it. As Foot &  Ankle specialists, our job is addressing the lower extremity problem and possible causes. Regarding extra body weight, we encourage patients to discuss diet and weight management plans with their primary care doctors. It is this team approach that gives you the best opportunity for pain relief and getting you back on your feet.            Follow-ups after your visit        Who to contact     If you have questions or need follow up information about today's clinic visit or your schedule please contact [facility] directly at [phone].  Normal or non-critical lab and imaging results will be communicated to you by Guardlyhart, letter or phone within 4 business days after the clinic has received the results. If you do not hear from us within 7 days, please contact the clinic through Enlightedt or phone. If you have a critical or abnormal lab result, we will notify you by phone as soon as possible.  Submit refill requests through MobiKwik or call your pharmacy and they will forward the refill request to us. Please allow 3 business days for your refill to be completed.          Additional Information About Your Visit        MyChart Information     MobiKwik gives you secure access to your electronic health record. If you see a primary care provider, you can also send messages to your care team and make appointments. If you have questions, please call your primary care clinic.  If you do not have a primary care provider, please call [phone] and they will assist you.        Care EveryWhere ID     This is your Care EveryWhere ID. This could be used by other organizations to access your [facility] [facility] medical records  [id number]        Your Vitals Were     Respirations Height Last Period BMI (Body Mass Index)          12 5' 6\" (1.676 m) 04/10/2018 (Approximate) 37.06 kg/m2         Blood Pressure from Last 3 Encounters: "   05/03/18 128/66   07/21/17 110/72    Weight from Last 3 Encounters:   05/07/18 229 lb 9.6 oz (104.1 kg)   05/03/18 229 lb 9.6 oz (104.1 kg)   07/21/17 232 lb 3.2 oz (105.3 kg)              Today, you had the following     No orders found for display       Primary Care Provider Office Phone # Fax #    Liza Day [phone] [address] [place] [address]        Equal Access to Services     [facility]: Hadii aad ku hadasho Soomaali, waaxda luqadaha, qaybta kaalmada adeegyafigueroa, cristobal mckinney . So [facility] [phone].    ATENCIÓN: Si habla español, tiene a feldman disposición servicios gratuitos de asistencia lingüística. Ll[facility] [phone].    We comply with applicable federal civil rights laws and [place] laws. We do not discriminate on the basis of race, color, national origin, age, disability, sex, sexual orientation, or gender identity.            Thank you!     Thank you for choosing [facility]  for your care. Our goal is always to provide you with excellent care. Hearing back from our patients is one way we can continue to improve our services. Please take a few minutes to complete the written survey that you may receive in the mail after your visit with us. Thank you!             Your Updated Medication List - Protect others around you: Learn how to safely use, store and throw away your medicines at www.disposemymeds.org.      Notice  As of 5/7/2018 11:20 AM    You have not been prescribed any medications.

## 2018-05-24 ENCOUNTER — RADIANT APPOINTMENT (OUTPATIENT)
Dept: MAMMOGRAPHY | Facility: CLINIC | Age: 48
End: 2018-05-24
Payer: COMMERCIAL

## 2018-05-24 DIAGNOSIS — Z12.31 ENCOUNTER FOR SCREENING MAMMOGRAM FOR BREAST CANCER: ICD-10-CM

## 2018-05-24 PROCEDURE — 77067 SCR MAMMO BI INCL CAD: CPT | Mod: TC

## 2018-06-04 ENCOUNTER — OFFICE VISIT (OUTPATIENT)
Dept: PODIATRY | Facility: CLINIC | Age: 48
End: 2018-06-04
Payer: COMMERCIAL

## 2018-06-04 VITALS
HEART RATE: 67 BPM | SYSTOLIC BLOOD PRESSURE: 112 MMHG | DIASTOLIC BLOOD PRESSURE: 68 MMHG | WEIGHT: 227.1 LBS | HEIGHT: 66 IN | OXYGEN SATURATION: 97 % | BODY MASS INDEX: 36.5 KG/M2

## 2018-06-04 DIAGNOSIS — G57.92 NEURITIS OF LEFT HEEL: Primary | ICD-10-CM

## 2018-06-04 PROCEDURE — 64450 NJX AA&/STRD OTHER PN/BRANCH: CPT | Performed by: PODIATRIST

## 2018-06-04 PROCEDURE — 99213 OFFICE O/P EST LOW 20 MIN: CPT | Mod: 25 | Performed by: PODIATRIST

## 2018-06-04 RX ORDER — TRIAMCINOLONE ACETONIDE 40 MG/ML
40 INJECTION, SUSPENSION INTRA-ARTICULAR; INTRAMUSCULAR ONCE
Qty: 1 ML | Refills: 0 | OUTPATIENT
Start: 2018-06-04 | End: 2018-06-04

## 2018-06-04 NOTE — MR AVS SNAPSHOT
"              After Visit Summary   6/4/2018    Veronica Messina    MRN: [id number]           Patient Information     Date Of Birth          1970        Visit Information        Provider Department      6/4/2018 11:00 AM Kam Thurston DPM [facility]        Today's Diagnoses     Neuritis of left heel    -  1       Follow-ups after your visit        Follow-up notes from your care team     Return if symptoms worsen or fail to improve.      Who to contact     If you have questions or need follow up information about today's clinic visit or your schedule please contact [facility] directly at [phone].  Normal or non-critical lab and imaging results will be communicated to you by Actifihart, letter or phone within 4 business days after the clinic has received the results. If you do not hear from us within 7 days, please contact the clinic through Actifihart or phone. If you have a critical or abnormal lab result, we will notify you by phone as soon as possible.  Submit refill requests through DialedIN or call your pharmacy and they will forward the refill request to us. Please allow 3 business days for your refill to be completed.          Additional Information About Your Visit        MyChart Information     DialedIN gives you secure access to your electronic health record. If you see a primary care provider, you can also send messages to your care team and make appointments. If you have questions, please call your primary care clinic.  If you do not have a primary care provider, please call [phone] and they will assist you.        Care EveryWhere ID     This is your Care EveryWhere ID. This could be used by other organizations to access your [facility] medical records  [id number]        Your Vitals Were     Pulse Height Pulse Oximetry BMI (Body Mass Index)          67 5' 6\" (1.676 m) 97% 36.65 kg/m2         Blood Pressure from Last 3 Encounters:   06/04/18 112/68   05/07/18 124/70 "   05/03/18 128/66    Weight from Last 3 Encounters:   06/04/18 227 lb 1.6 oz (103 kg)   05/07/18 229 lb 9.6 oz (104.1 kg)   05/03/18 229 lb 9.6 oz (104.1 kg)              We Performed the Following     INJECTION NERVE BLOCK, OTHER PERIPHERAL          Today's Medication Changes          These changes are accurate as of 6/4/18  3:06 PM.  If you have any questions, ask your nurse or doctor.               Start taking these medicines.        Dose/Directions    triamcinolone acetonide 40 MG/ML injection   Commonly known as:  KENALOG-40   Used for:  Neuritis of left heel   Started by:  Kam Thurston DPM        Dose:  40 mg   1 mL (40 mg) by INTRA-ARTICULAR route once for 1 dose   Quantity:  1 mL   Refills:  0            Where to get your medicines      Some of these will need a paper prescription and others can be bought over the counter.  Ask your nurse if you have questions.     You don't need a prescription for these medications     triamcinolone acetonide 40 MG/ML injection                Primary Care Provider Office Phone # Fax #    Liza Day [phone] [address] [place] [address]        Equal Access to Services     [facility]: Hadii antwan jordano Solily, waaxda luneftaliadaha, qaybta kaalmada adeemanuelyada, cristobal lewis. So [facility] [phone].    ATENCIÓN: Si habla español, tiene a feldman disposición servicios gratuitos de asistencia lingüística. LlTrumbull [facility] Medical Center [phone].    We comply with applicable federal civil rights laws and [place] laws. We do not discriminate on the basis of race, color, national origin, age, disability, sex, sexual orientation, or gender identity.            Thank you!     Thank you for choosing [facility]  for your care. Our goal is always to provide you with excellent care. Hearing back from our patients is one way we can continue to improve our services. Please take a few minutes to complete the  written survey that you may receive in the mail after your visit with us. Thank you!             Your Updated Medication List - Protect others around you: Learn how to safely use, store and throw away your medicines at www.disposemymeds.org.          This list is accurate as of 6/4/18  3:06 PM.  Always use your most recent med list.                   Brand Name Dispense Instructions for use Diagnosis    triamcinolone acetonide 40 MG/ML injection    KENALOG-40    1 mL    1 mL (40 mg) by INTRA-ARTICULAR route once for 1 dose    Neuritis of left heel

## 2018-06-04 NOTE — PROGRESS NOTES
"Foot & Ankle Surgery   June 4, 2018    S:  Pt is seen today for evaluation of left heel pain.  She did tier 1 therapies, including getting inserts.  Her morning pain is still quite severe and she can only stand for about 10 minutes before her pain starts getting bad.    Vitals:    06/04/18 1106   BP: 112/68   BP Location: Right arm   Patient Position: Sitting   Cuff Size: Adult Large   Pulse: 67   SpO2: 97%   Weight: 227 lb 1.6 oz (103 kg)   Height: 5' 6\" (1.676 m)   '      ROS - Pos for CC.  Patient denies current nausea, vomiting, chills, fevers, belly pain, calf pain, chest pain or SOB.  Complete remainder of ROS it otherwise neg.      PE:  Gen:   No apparent distress  Eye:    Visual scanning without deficit  Ear:    Response to auditory stimuli wnl  Lung:    Non-labored breathing on RA noted  Abd:    NTND per patient report  Lymph:    Neg for pitting/non-pitting edema BLE  Vasc:    Pulses palpable, CFT minimally delayed  Neuro:    Light touch sensation intact to all sensory nerve distributions without paresthesias  Derm:    Neg for nodules, lesions or ulcerations  MSK:    Left lower extremity - pain at plantar heel and along ICN medial L heel.   Calf:    Neg for redness, swelling or tenderness    Assessment:  48 year old female with left heel pain in setting of plantar fasciitis and Morocho's neuritis       Plan:  Discussed etiologies, anatomy and options  1.  Left heel pain in setting of plantar fasciitis and Morocho's neuritis   -diagnostic/therapeutic ICN injection, see procedure note  -continue all tier 1 therapies for heel pain; reviewed  -follow up prn based on injection results.  Consider future therapies based on injection results    After obtaining written consent, the skin was prepped with alcohol.  The needle was advance to the underlying inferior calcaneal nerve, aspiration was done with position change.  1 1/2cc mixture of 2:1 kenalog 40:1% lidocaine plain was injected.  The patient tolerated the " procedure without complication.  Risks that were discussed included possible joint/soft tissue damage, neuritis/numbness, infection, pigment change, steroid flare.     Follow up:  Prn based on injection results or sooner with acute issues      Body mass index is 36.65 kg/(m^2).  Weight management plan: Patient was referred to their PCP to discuss a diet and exercise plan.         Kam Thurston DPM [facility]AC[facility] FACFAOM  Podiatric Foot & Ankle Surgeon  [facility]  [phone]

## 2018-10-10 ENCOUNTER — OFFICE VISIT (OUTPATIENT)
Dept: PODIATRY | Facility: CLINIC | Age: 48
End: 2018-10-10
Payer: COMMERCIAL

## 2018-10-10 VITALS
WEIGHT: 228 LBS | HEIGHT: 66 IN | SYSTOLIC BLOOD PRESSURE: 110 MMHG | DIASTOLIC BLOOD PRESSURE: 70 MMHG | BODY MASS INDEX: 36.64 KG/M2

## 2018-10-10 DIAGNOSIS — M79.675 TOE PAIN, LEFT: Primary | ICD-10-CM

## 2018-10-10 DIAGNOSIS — L60.0 INGROWING NAIL: ICD-10-CM

## 2018-10-10 PROCEDURE — 11730 AVULSION NAIL PLATE SIMPLE 1: CPT | Performed by: PODIATRIST

## 2018-10-10 PROCEDURE — 99213 OFFICE O/P EST LOW 20 MIN: CPT | Mod: 25 | Performed by: PODIATRIST

## 2018-10-10 NOTE — PROGRESS NOTES
"ASSESSMENT/PLAN:    Encounter Diagnoses   Name Primary?     Toe pain, left Yes     Ingrowing nail      The potential causes and nature of an ingrown toenail were discussed with the patient.  We reviewed the natural history/prognosis of the condition and potential risks if no treatment is provided.      Treatment options discussed included conservative management (oral antibiotics, soaking of foot, adequate width shoes)  as well as surgical management (partial or total nail removal).  The pros and cons of both forms of treatment were reviewed.      After thorough discussion and answering all questions, the patient elected to a partial nail avulsion (option of permanent removal of the nail edge was discussed. She said she will consider that option, if the problem is recurrent).     Nail Avulsion Procedure  (non permanent removal)    The procedure was discussed with the patient, including risk of infection, abnormal nail regrowth, and possible need for a future nail procedure.  Post procedure home cares were explained. These cares are important for preventing infection and aiding in timely healing.   Verbal and written consent was obtained.   The site was marked and the \"Time Out\" called.     The base of the left hallux  was injected with 2 cc of  2% Lidocaine plain.  The toe was then prepped with betadine solution.  A tourniquet was applied around the base of the toe for hemostasis.   Next the toe was checked for adequate anesthesia.  With the Pt comfortable, the lateral nail was freed from the nail bed and marginal soft tissue attachments with a blunt instrument.  ( A nail splitter was then used to make a longitudinal cut 2mm from the lateral nail fold.  It was completed, atraumatically, under the eponychium with a [place] blade. ) Next, it was firmly grasped with a hemostat and removed in total.      Silvadene ointment was applied to the nail bed, followed by a compressive dressing.  The tourniquet was removed.  The " "distal toe turned immediately pink.  The foot was kept elevated for several minutes.  The patient tolerated the procedure well.      Patient is instructed to watch for, and call if,  increasing redness, drainage, and pain after 2-3 days.  Post procedure instructions provided - handout given.      Weight management plan: Patient was referred to their PCP to discuss a diet and exercise plan.      Will Segura DPM, FACFAS, MS    [facility]/Foot & Ankle Surgery      ____________________________________________________________________    HPI:         Chief Complaint: left great toe pain; ingrown toenail (lateral edge)  Onset of problem: 7 weeks; started during a vacation to [place]  Pain/ discomfort is described as:  Burning  Ratin/10   Frequency:  constant    The pain is made worse with wearing shoes  Previous treatment: cream, soaking      Patient Active Problem List   Diagnosis     obesity     Past Surgical History:   Procedure Laterality Date     BUNIONECTOMY Left      BUNIONECTOMY Right      HC REPAIR OF HAMMERTOE,ONE Right      HYSTEROSCOPY       TONSILLECTOMY  1977     No current outpatient prescriptions on file.       ROS:    A 10-point review of systems was performed.  It is positive for that noted in the HPI and as seen below.  All other systems found to be negative.     Numbness in feet?  no   Calf pain with walking? no  Recent foot/ankle injury? no  Weight change  over past 12 months? no  Self perception as overweight? yes  Recent flu-like symptoms? no  Joint pain other than feet ? no    EXAM:    Vitals: /70  Ht 5' 6\" (1.676 m)  Wt 228 lb (103.4 kg)  BMI 36.8 kg/m2  BMI: Body mass index is 36.8 kg/(m^2).  Height: 5' 6\"    Constitutional/ general:  Pt is in no apparent distress, appears well-nourished.  Cooperative with history and physical exam.     Vascular:  Pedal pulses are palpable bilaterally for both the DP and PT arteries.  CFT < 3 sec.  No edema.  Pedal hair growth " noted.     Neuro:  Alert and oriented x 3. Coordinated gait.  Light touch sensation is intact to the L4, L5, S1 distributions. No obvious deficits.  No evidence of neurological-based weakness, spasticity, or contracture in the lower extremities.     Derm:  Localized edema, erythema and pain along the lateral nail unit, left hallux.    Musculoskeletal:    Lower extremity muscle strength is normal.  Patient is ambulatory without an assistive device or brace .  No gross deformities.        Will Segura DPM, FACFAS, MS    [facility] of [facility]ry/Foot & Ankle Surgery

## 2018-10-10 NOTE — PATIENT INSTRUCTIONS
Thank you for choosing [facility]!    DR. OLSEN'S CLINIC LOCATIONS     MONDAY - [place] WEDNESDAY - [place]   [address] [address] [place] [address]   [phone] / [phone] / [phone]       THURSDAY - [place] SCHEDULE SURGERY: [phone]   [address] APPOINTMENTS: [phone]   [place] [address] BILLING QUESTIONS: [phone] / [phone]       [facility]  1. Keep bandage on until that evening or the day after your procedure. If the bandage falls off, start the soaking process.    2. Some bleeding is normal. If bleeding seems excessive to you, place ice on top of your foot for 15-20 minutes and elevate your foot above heart level.    3. Over the counter pain medication, elevating your foot and ice application is all you will need for pain control.    4. If the bandage feels too tight and your toe is throbbing it is ok to remove the bandage and start soaking.     5. For two weeks, soak your foot twice a day in mild skin friendly soap (dish or hand soap) and warm water for 15 minutes. It is ok to soak your foot for a few minutes to loosen the dressing applied in the clinic. After soaking, blot dry and apply a regular band aid.    6. It is normal to experience some discomfort and redness around the nail for several days following the procedure. Drainage will likely appear a red- yellow. This is normal. If your toe is still draining a red-yellow fluid after 2 weeks continue to soak foot.    7. Initial discomfort might last for 2-3 days. You may resume with regular activity as soon as you are comfortable, as long as you keep the wound clean and dry and follow the soaking instruction. It is recommended that you do not enter public swimming pools/hot tubs while your toe is draining.    8. If you are experiencing worsening pain and redness or notice pus after 2-3  days please contact the clinic. If it is after hours call the clinic number and follow the voice prompter. This will direct you to an on call doctor.      Body Mass Index (BMI)  Many things can cause foot and ankle problems. Foot structure, activity level, foot mechanics and injuries are common causes of pain.  One very important issue that often goes unmentioned, is body weight.  Extra weight can cause increased stress on muscles, ligaments, bones and tendons.  Sometimes just a few extra pounds is all it takes to put one over her/his threshold. Without reducing that stress, it can be difficult to alleviate pain. Some people are uncomfortable addressing this issue, but we feel it is important for you to think about it. As Foot &  Ankle specialists, our job is addressing the lower extremity problem and possible causes. Regarding extra body weight, we encourage patients to discuss diet and weight management plans with their primary care doctors. It is this team approach that gives you the best opportunity for pain relief and getting you back on your feet.

## 2018-10-10 NOTE — LETTER
"    10/10/2018         RE: Veronica Messina  [address] [place] [place] [address]        Dear Colleague,    Thank you for referring your patient, Veronica Messina, to the [facility]. Please see a copy of my visit note below.    ASSESSMENT/PLAN:    Encounter Diagnoses   Name Primary?     Toe pain, left Yes     Ingrowing nail      The potential causes and nature of an ingrown toenail were discussed with the patient.  We reviewed the natural history/prognosis of the condition and potential risks if no treatment is provided.      Treatment options discussed included conservative management (oral antibiotics, soaking of foot, adequate width shoes)  as well as surgical management (partial or total nail removal).  The pros and cons of both forms of treatment were reviewed.      After thorough discussion and answering all questions, the patient elected to a partial nail avulsion (option of permanent removal of the nail edge was discussed. She said she will consider that option, if the problem is recurrent).     Nail Avulsion Procedure  (non permanent removal)    The procedure was discussed with the patient, including risk of infection, abnormal nail regrowth, and possible need for a future nail procedure.  Post procedure home cares were explained. These cares are important for preventing infection and aiding in timely healing.   Verbal and written consent was obtained.   The site was marked and the \"Time Out\" called.     The base of the left hallux  was injected with 2 cc of  2% Lidocaine plain.  The toe was then prepped with betadine solution.  A tourniquet was applied around the base of the toe for hemostasis.   Next the toe was checked for adequate anesthesia.  With the Pt comfortable, the lateral nail was freed from the nail bed and marginal soft tissue attachments with a blunt instrument.  ( A nail splitter was then used to make a longitudinal cut 2mm from the lateral nail fold.  It was completed, " "atraumatically, under the eponychium with a [place] blade. ) Next, it was firmly grasped with a hemostat and removed in total.      Silvadene ointment was applied to the nail bed, followed by a compressive dressing.  The tourniquet was removed.  The distal toe turned immediately pink.  The foot was kept elevated for several minutes.  The patient tolerated the procedure well.      Patient is instructed to watch for, and call if,  increasing redness, drainage, and pain after 2-3 days.  Post procedure instructions provided - handout given.      Weight management plan: Patient was referred to their PCP to discuss a diet and exercise plan.      Will Segura DPM, FACFAS, MS    [facility] of Podiatry/Foot & Ankle Surgery      ____________________________________________________________________    HPI:         Chief Complaint: left great toe pain; ingrown toenail (lateral edge)  Onset of problem: 7 weeks; started during a vacation to [place]  Pain/ discomfort is described as:  Burning  Ratin/10   Frequency:  constant    The pain is made worse with wearing shoes  Previous treatment: cream, soaking      Patient Active Problem List   Diagnosis     obesity     Past Surgical History:   Procedure Laterality Date     BUNIONECTOMY Left      BUNIONECTOMY Right      HC REPAIR OF HAMMERTOE,ONE Right      HYSTEROSCOPY       TONSILLECTOMY  1977     No current outpatient prescriptions on file.       ROS:    A 10-point review of systems was performed.  It is positive for that noted in the HPI and as seen below.  All other systems found to be negative.     Numbness in feet?  no   Calf pain with walking? no  Recent foot/ankle injury? no  Weight change  over past 12 months? no  Self perception as overweight? yes  Recent flu-like symptoms? no  Joint pain other than feet ? no    EXAM:    Vitals: /70  Ht 5' 6\" (1.676 m)  Wt 228 lb (103.4 kg)  BMI 36.8 kg/m2  BMI: Body mass index is 36.8 kg/(m^2).  Height: 5' " "6\"    Constitutional/ general:  Pt is in no apparent distress, appears well-nourished.  Cooperative with history and physical exam.     Vascular:  Pedal pulses are palpable bilaterally for both the DP and PT arteries.  CFT < 3 sec.  No edema.  Pedal hair growth noted.     Neuro:  Alert and oriented x 3. Coordinated gait.  Light touch sensation is intact to the L4, L5, S1 distributions. No obvious deficits.  No evidence of neurological-based weakness, spasticity, or contracture in the lower extremities.     Derm:  Localized edema, erythema and pain along the lateral nail unit, left hallux.    Musculoskeletal:    Lower extremity muscle strength is normal.  Patient is ambulatory without an assistive device or brace .  No gross deformities.        Will Segura DPM, FACFAS, MS    [place]/Foot & Ankle Surgery              Again, thank you for allowing me to participate in the care of your patient.        Sincerely,        Will Segura DPM    "

## 2018-10-10 NOTE — MR AVS SNAPSHOT
After Visit Summary   10/10/2018    Veronica Messina    MRN: [id number]           Patient Information     Date Of Birth          1970        Visit Information        Provider Department      10/10/2018 7:30 AM Will Olsen DPM [facility]        Care Instructions    Thank you for choosing [facility]!    DR. OLSEN'S CLINIC LOCATIONS     MONDAY - [place] WEDN[place] - [place]   [address] [place] [address] [place] [address]   [phone] / [phone] / [phone]       THURSDAY - [place] SCHEDULE SURGERY: [phone]   [address] APPOINTMENTS: [phone]   [place] [address] BILLING QUESTIONS: [phone]       [facility]  1. Keep bandage on until that evening or the day after your procedure. If the bandage falls off, start the soaking process.    2. Some bleeding is normal. If bleeding seems excessive to you, place ice on top of your foot for 15-20 minutes and elevate your foot above heart level.    3. Over the counter pain medication, elevating your foot and ice application is all you will need for pain control.    4. If the bandage feels too tight and your toe is throbbing it is ok to remove the bandage and start soaking.     5. For two weeks, soak your foot twice a day in mild skin friendly soap (dish or hand soap) and warm water for 15 minutes. It is ok to soak your foot for a few minutes to loosen the dressing applied in the clinic. After soaking, blot dry and apply a regular band aid.    6. It is normal to experience some discomfort and redness around the nail for several days following the procedure. Drainage will likely appear a red- yellow. This is normal. If your toe is still draining a red-yellow fluid after 2 weeks continue to soak foot.    7. Initial discomfort might last for 2-3 days. You may resume with  regular activity as soon as you are comfortable, as long as you keep the wound clean and dry and follow the soaking instruction. It is recommended that you do not enter public swimming pools/hot tubs while your toe is draining.    8. If you are experiencing worsening pain and redness or notice pus after 2-3 days please contact the clinic. If it is after hours call the clinic number and follow the voice prompter. This will direct you to an on call doctor.      Body Mass Index (BMI)  Many things can cause foot and ankle problems. Foot structure, activity level, foot mechanics and injuries are common causes of pain.  One very important issue that often goes unmentioned, is body weight.  Extra weight can cause increased stress on muscles, ligaments, bones and tendons.  Sometimes just a few extra pounds is all it takes to put one over her/his threshold. Without reducing that stress, it can be difficult to alleviate pain. Some people are uncomfortable addressing this issue, but we feel it is important for you to think about it. As Foot &  Ankle specialists, our job is addressing the lower extremity problem and possible causes. Regarding extra body weight, we encourage patients to discuss diet and weight management plans with their primary care doctors. It is this team approach that gives you the best opportunity for pain relief and getting you back on your feet.              Follow-ups after your visit        Who to contact     If you have questions or need follow up information about today's clinic visit or your schedule please contact [facility] directly at [phone].  Normal or non-critical lab and imaging results will be communicated to you by MyChart, letter or phone within 4 business days after the clinic has received the results. If you do not hear from us within 7 days, please contact the clinic through MyChart or phone. If you have a critical or abnormal lab result, we will notify you by phone as  "soon as possible.  Submit refill requests through [employer] or call your pharmacy and they will forward the refill request to us. Please allow 3 business days for your refill to be completed.          Additional Information About Your Visit        [facility] Information     [employer] gives you secure access to your electronic health record. If you see a primary care provider, you can also send messages to your care team and make appointments. If you have questions, please call your primary care clinic.  If you do not have a primary care provider, please call [phone] and they will assist you.        Care EveryWhere ID     This is your Care EveryWhere ID. This could be used by other organizations to access your [facility] medical records  [id number]        Your Vitals Were     Height BMI (Body Mass Index)                5' 6\" (1.676 m) 36.8 kg/m2           Blood Pressure from Last 3 Encounters:   10/10/18 110/70   06/04/18 112/68   05/07/18 124/70    Weight from Last 3 Encounters:   10/10/18 228 lb (103.4 kg)   06/04/18 227 lb 1.6 oz (103 kg)   05/07/18 229 lb 9.6 oz (104.1 kg)              Today, you had the following     No orders found for display       Primary Care Provider Office Phone # Fax #    Liza Day [phone] [address] [place] [address]        Equal Access to Services     [facility] AH: Hadii aad ku hadasho Soomaali, waaxda luqadaha, qaybta kaalmada gladysda, cristobal lewis. So [facility] [phone].    ATENCIÓN: Si habla español, tiene a feldman disposición servicios gratuitos de asistencia lingüística. Max al [phone].    We comply with applicable federal civil rights laws and [place] laws. We do not discriminate on the basis of race, color, national origin, age, disability, sex, sexual orientation, or gender identity.            Thank you!     Thank you for choosing [facility]  for your care. Our goal is always " to provide you with excellent care. Hearing back from our patients is one way we can continue to improve our services. Please take a few minutes to complete the written survey that you may receive in the mail after your visit with us. Thank you!             Your Updated Medication List - Protect others around you: Learn how to safely use, store and throw away your medicines at www.disposemymeds.org.      Notice  As of 10/10/2018  7:48 AM    You have not been prescribed any medications.

## 2019-02-09 ENCOUNTER — OFFICE VISIT (OUTPATIENT)
Dept: URGENT CARE | Facility: URGENT CARE | Age: 49
End: 2019-02-09
Payer: COMMERCIAL

## 2019-02-09 VITALS
WEIGHT: 237 LBS | RESPIRATION RATE: 12 BRPM | TEMPERATURE: 98.2 F | HEART RATE: 80 BPM | BODY MASS INDEX: 38.25 KG/M2 | DIASTOLIC BLOOD PRESSURE: 72 MMHG | OXYGEN SATURATION: 97 % | SYSTOLIC BLOOD PRESSURE: 120 MMHG

## 2019-02-09 DIAGNOSIS — J20.9 ACUTE BRONCHITIS, UNSPECIFIED ORGANISM: Primary | ICD-10-CM

## 2019-02-09 PROCEDURE — 99213 OFFICE O/P EST LOW 20 MIN: CPT | Performed by: FAMILY MEDICINE

## 2019-02-09 RX ORDER — ALBUTEROL SULFATE 90 UG/1
2 AEROSOL, METERED RESPIRATORY (INHALATION) EVERY 6 HOURS
Qty: 1 INHALER | Refills: 0 | Status: SHIPPED | OUTPATIENT
Start: 2019-02-09 | End: 2019-05-17

## 2019-02-09 RX ORDER — AZITHROMYCIN 250 MG/1
TABLET, FILM COATED ORAL
Qty: 6 TABLET | Refills: 0 | Status: SHIPPED | OUTPATIENT
Start: 2019-02-09 | End: 2019-05-17

## 2019-02-09 NOTE — PROGRESS NOTES
SUBJECTIVE:   Veronica Messina is a 49 year old female who presents to clinic today for the following health issues:    HPI     URI symptoms this winter with sudden congested harsh cough overnight.  No fever or chills.  Nonsmoker.  +sick contacts at work and 9 year old daughter at home.  Coughing so hard that she has HA and ST now.  Can't sleep.  Had similar cough last winter that took months to go away because she did not seek early treatment.    Problem list and histories reviewed & adjusted, as indicated.  Additional history: as documented        Patient Active Problem List   Diagnosis     obesity     Past Surgical History:   Procedure Laterality Date     BUNIONECTOMY Left      BUNIONECTOMY Right      HC REPAIR OF HAMMERTOE,ONE Right      HYSTEROSCOPY       TONSILLECTOMY  1977       Social History     Tobacco Use     Smoking status: Never Smoker     Smokeless tobacco: Never Used   Substance Use Topics     Alcohol use: No     Comment: occ     Family History   Problem Relation Age of Onset     Hypertension Mother      Diabetes Mother      Prostate Problems Father      Arrhythmia Brother         WPW     Obesity Brother      Thyroid Disease Maternal Grandmother      Dementia Maternal Grandmother      Diabetes Maternal Grandfather      Kidney Disease Maternal Grandfather      Heart Disease Maternal Grandfather      Heart Disease Paternal Grandmother      Cancer Paternal Grandfather         throat     Lung Cancer Paternal Grandfather          No current outpatient medications on file.     No Known Allergies  Recent Labs   Lab Test 05/03/18  1036 07/21/17  1019 06/29/15   LDL 92 97 95   HDL 66 78 61   TRIG 66 66 83   ALT 19 17  --    CR 0.63 0.73  --    GFRESTIMATED >90 85  --    GFRESTBLACK >90 >90  African American GFR Calc    --    POTASSIUM 4.1 4.2  --    TSH  --  1.03  --       BP Readings from Last 3 Encounters:   02/09/19 120/72   10/10/18 110/70   06/04/18 112/68    Wt Readings from Last 3 Encounters:    02/09/19 107.5 kg (237 lb)   10/10/18 103.4 kg (228 lb)   06/04/18 103 kg (227 lb 1.6 oz)                    ROS:  Constitutional, HEENT, cardiovascular, pulmonary, gi and gu systems are negative, except as otherwise noted.    OBJECTIVE:     /72   Pulse 80   Temp 98.2  F (36.8  C) (Oral)   Resp 12   Wt 107.5 kg (237 lb)   SpO2 97%   BMI 38.25 kg/m    Body mass index is 38.25 kg/m .  GENERAL: healthy, alert and no distress  EYES: Eyes grossly normal to inspection, PERRL and conjunctivae and sclerae normal  NECK: no adenopathy, no asymmetry, masses, or scars and thyroid normal to palpation  RESP: lungs clear to auscultation - no rales, rhonchi or wheezes, very harsh congested cough with multiple jags while in room  CV: regular rate and rhythm, normal S1 S2, no S3 or S4, no murmur, click or rub, no peripheral edema and peripheral pulses strong  MS: no gross musculoskeletal defects noted, no edema  PSYCH: mentation appears normal, affect normal/bright    ASSESSMENT/PLAN:     1. Acute bronchitis, unspecified organism    - azithromycin (ZITHROMAX) 250 MG tablet; Take 2 tablets (500 mg) by mouth daily for 1 day, THEN 1 tablet (250 mg) daily for 4 days.  Dispense: 6 tablet; Refill: 0  - albuterol (PROAIR HFA/PROVENTIL HFA/VENTOLIN HFA) 108 (90 Base) MCG/ACT inhaler; Inhale 2 puffs into the lungs every 6 hours  Dispense: 1 Inhaler; Refill: 0    Treat with Zpak and albuterol MDI.  Continue with Tylenol/ibuprofen and fluids.  Close Follow-up if no change or worsening symptoms prn.    Lashaun Patino MD  [facility]

## 2019-02-09 NOTE — NURSING NOTE
"Chief Complaint   Patient presents with     Urgent Care     Cough     Pt had sudden onset of coughing yesterday and it feels like her chest is heavy.  Cough is occasionally productive.  Cough is causing some discomfort in her throat.  She feels that the cough is causing her to have a h/a as well.  Cough kept her  awake last night.  She says hse had the same thing last winter and it took months to get over it so she came in right away this time.      Initial /72   Pulse 80   Temp 98.2  F (36.8  C) (Oral)   Resp 12   Wt 107.5 kg (237 lb)   SpO2 97%   BMI 38.25 kg/m   Estimated body mass index is 38.25 kg/m  as calculated from the following:    Height as of 10/10/18: 1.676 m (5' 6\").    Weight as of this encounter: 107.5 kg (237 lb)..  BP completed using cuff size: ayo Roth R.N.    "

## 2019-05-12 NOTE — PATIENT INSTRUCTIONS
Will check preventive fasting labs today.  Will also check thyroid levels due to irregular periods.  If normal, likely you are mira-menopausal.    Please let me know if you would like a referral to therapy - you do not need to make an appointment for this.  If you develop worsening mood symptoms, such as hopelessness, please make an appointment to discuss management.      Preventive Health Recommendations  Female Ages 40 to 49    Yearly exam:     See your health care provider every year in order to  1. Review health changes.   2. Discuss preventive care.    3. Review your medicines if your doctor prescribed any.      Get a Pap test every three years (unless you have an abnormal result and your provider advises testing more often).      If you get Pap tests with HPV test, you only need to test every 5 years, unless you have an abnormal result. You do not need a Pap test if your uterus was removed (hysterectomy) and you have not had cancer.      You should be tested each year for STDs (sexually transmitted diseases), if you're at risk.     Ask your doctor if you should have a mammogram.      Have a colonoscopy (test for colon cancer) if someone in your family has had colon cancer or polyps before age 50.       Have a cholesterol test every 5 years.       Have a diabetes test (fasting glucose) after age 45. If you are at risk for diabetes, you should have this test every 3 years.    Shots: Get a flu shot each year. Get a tetanus shot every 10 years.     Nutrition:     Eat at least 5 servings of fruits and vegetables each day.    Eat whole-grain bread, whole-wheat pasta and brown rice instead of white grains and rice.    Get adequate Calcium and Vitamin D.      Lifestyle    Exercise at least 150 minutes a week (an average of 30 minutes a day, 5 days a week). This will help you control your weight and prevent disease.    Limit alcohol to one drink per day.    No smoking.     Wear sunscreen to prevent skin cancer.    See  your dentist every six months for an exam and cleaning.

## 2019-05-12 NOTE — PROGRESS NOTES
SUBJECTIVE:   CC: Veronica Messina is an 49 year old woman who presents for preventive health visit.     Healthy Habits:     Getting at least 3 servings of Calcium per day:  Yes    Bi-annual eye exam:  Yes    Dental care twice a year:  Yes    Sleep apnea or symptoms of sleep apnea:  None    Diet:  Regular (no restrictions)    Frequency of exercise:  1 day/week    Duration of exercise:  Less than 15 minutes    Taking medications regularly:  Yes    Medication side effects:  Not applicable    PHQ-2 Total Score: 1    Additional concerns today:  Yes    Struggles with weight  Decreased exercise tolerance - shortness of breath  Heart disease in family - is concerned about her heart risk factors  Urinary incontinence - chronic ongoing, not worsening  Periods irregular (August, March)  Hx of Migraines - excedrin helpful.  Not getting enough sleep.  Stress - fighting with daughters.   recent heart attack.       Today's PHQ-2 Score:   PHQ-2 ( 1999 Pf[employer]) 5/15/2019   Q1: Little interest or pleasure in doing things 0   Q2: Feeling down, depressed or hopeless 1   PHQ-2 Score 1   Q1: Little interest or pleasure in doing things Not at all   Q2: Feeling down, depressed or hopeless Several days   PHQ-2 Score 1       Abuse: Current or Past(Physical, Sexual or Emotional)- No  Do you feel safe in your environment? Yes    Social History     Tobacco Use     Smoking status: Never Smoker     Smokeless tobacco: Never Used   Substance Use Topics     Alcohol use: No     Comment: occ         Alcohol Use 5/15/2019   Prescreen: >3 drinks/day or >7 drinks/week? No   Prescreen: >3 drinks/day or >7 drinks/week? -       Reviewed orders with patient.  Reviewed health maintenance and updated orders accordingly - Yes  Lab work is in process  Patient Active Problem List   Diagnosis     obesity     Mixed stress and urge urinary incontinence     Intractable chronic migraine without aura     Past Surgical History:   Procedure Laterality Date      BUNIONECTOMY Left      BUNIONECTOMY Right      [facility] REPAIR OF HAMMERTOE,ONE Right      HYSTEROSCOPY       TONSILLECTOMY  1977       Social History     Tobacco Use     Smoking status: Never Smoker     Smokeless tobacco: Never Used   Substance Use Topics     Alcohol use: No     Comment: occ     Family History   Problem Relation Age of Onset     Hypertension Mother      Diabetes Mother      Prostate Problems Father      Arrhythmia Brother         WPW     Obesity Brother      Thyroid Disease Maternal Grandmother      Dementia Maternal Grandmother      Diabetes Maternal Grandfather      Kidney Disease Maternal Grandfather      Heart Disease Maternal Grandfather      Heart Disease Paternal Grandmother      Cancer Paternal Grandfather         throat     Lung Cancer Paternal Grandfather            Mammogram Screening: Patient under age 50, mutual decision reflected in health maintenance.      Pertinent mammograms are reviewed under the imaging tab.  History of abnormal Pap smear: NO - age 30-65 PAP every 5 years with negative HPV co-testing recommended     Reviewed and updated as needed this visit by clinical staff  Tobacco  Allergies  Meds  Med Hx  Surg Hx  Fam Hx  Soc Hx        Reviewed and updated as needed this visit by Provider            Review of Systems   Constitutional: Negative for chills and fever.   HENT: Negative for congestion, ear pain, hearing loss and sore throat.    Eyes: Negative for pain and visual disturbance.   Respiratory: Positive for shortness of breath. Negative for cough.         Describes as increased exercise intolerance - feels more winded with exercise, feels this is due to weight gain and deconditioning   Cardiovascular: Negative for chest pain, palpitations and peripheral edema.   Gastrointestinal: Negative for abdominal pain, constipation, diarrhea, heartburn, hematochezia and nausea.   Breasts:  Negative for tenderness, breast mass and discharge.   Genitourinary: Positive for  "frequency, urgency and vaginal discharge. Negative for dysuria, genital sores, hematuria, pelvic pain and vaginal bleeding.        Not acute - chronic and ongoing   Musculoskeletal: Positive for myalgias. Negative for arthralgias and joint swelling.   Skin: Negative for rash.   Neurological: Positive for dizziness and headaches. Negative for weakness and paresthesias.   Psychiatric/Behavioral: Positive for mood changes. The patient is nervous/anxious.           OBJECTIVE:   /76 (BP Location: Right arm, Patient Position: Chair, Cuff Size: Adult Large)   Pulse 66   Temp 98  F (36.7  C) (Oral)   Ht 1.689 m (5' 6.5\")   Wt 104.8 kg (231 lb)   LMP 03/15/2019 (Approximate)   SpO2 98%   Breastfeeding? No   BMI 36.73 kg/m    Physical Exam  GENERAL: healthy, alert and no distress  EYES: Eyes grossly normal to inspection, PERRL and conjunctivae and sclerae normal  HENT: ear canals and TM's normal, nose and mouth without ulcers or lesions  NECK: no adenopathy, no asymmetry, masses, or scars and thyroid normal to palpation  RESP: lungs clear to auscultation - no rales, rhonchi or wheezes  CV: regular rate and rhythm, normal S1 S2, no S3 or S4, no murmur, click or rub, no peripheral edema and peripheral pulses strong  ABDOMEN: soft, nontender, no hepatosplenomegaly, no masses and bowel sounds normal  MS: no gross musculoskeletal defects noted, no edema  SKIN: no suspicious lesions or rashes  NEURO: Normal strength and tone, mentation intact and speech normal  PSYCH: mentation appears normal, affect normal/bright    Diagnostic Test Results:  none     ASSESSMENT/PLAN:   1. Routine general medical examination at a health care facility  48 yo female here for annual preventive health physical.  Reviewed healthy BP and BMI ranges.  Counseled on lifestyle modifications for optimal mental and physical health.  Discussed age-appropriate health maintanence.  Additional concerns addressed, including modifiable risk factors for " "cardiovascular disease and stress management.  - Glucose    2. Irregular periods  Likely perimenopausal.  - TSH with free T4 reflex    3. Screening for lipid disorders  - Lipid panel reflex to direct LDL Fasting    COUNSELING:  Reviewed preventive health counseling, as reflected in patient instructions  Special attention given to:        Regular exercise       Healthy diet/nutrition       Vision screening       Colon cancer screening       (Jovita)menopause management       The 10-year ASCVD risk score (Alberto ALLISON Jr., et al., 2013) is: 0.7%    Values used to calculate the score:      Age: 49 years      Sex: Female      Is Non- : No      Diabetic: No      Tobacco smoker: No      Systolic Blood Pressure: 120 mmHg      Is BP treated: No      HDL Cholesterol: 68 mg/dL      Total Cholesterol: 198 mg/dL    Estimated body mass index is 36.73 kg/m  as calculated from the following:    Height as of this encounter: 1.689 m (5' 6.5\").    Weight as of this encounter: 104.8 kg (231 lb).    Weight management plan: Discussed healthy diet and exercise guidelines     reports that she has never smoked. She has never used smokeless tobacco.      Counseling Resources:  ATP IV Guidelines  Pooled Cohorts Equation Calculator  Breast Cancer Risk Calculator  FRAX Risk Assessment  ICSI Preventive Guidelines  Dietary Guidelines for Americans, 2010  USDA's MyPlate  ASA Prophylaxis  Lung CA Screening    Renate Eagle MD  [facility]  "

## 2019-05-15 ASSESSMENT — ENCOUNTER SYMPTOMS
HEMATURIA: 0
SORE THROAT: 0
DYSURIA: 0
NERVOUS/ANXIOUS: 1
PARESTHESIAS: 0
FREQUENCY: 1
CHILLS: 0
FEVER: 0
COUGH: 0
ARTHRALGIAS: 0
ABDOMINAL PAIN: 0
HEMATOCHEZIA: 0
CONSTIPATION: 0
DIARRHEA: 0
HEARTBURN: 0
HEADACHES: 1
NAUSEA: 0
SHORTNESS OF BREATH: 1
WEAKNESS: 0
DIZZINESS: 1
MYALGIAS: 1
PALPITATIONS: 0
EYE PAIN: 0
JOINT SWELLING: 0
BREAST MASS: 0

## 2019-05-17 ENCOUNTER — TELEPHONE (OUTPATIENT)
Dept: PEDIATRICS | Facility: CLINIC | Age: 49
End: 2019-05-17

## 2019-05-17 ENCOUNTER — OFFICE VISIT (OUTPATIENT)
Dept: PEDIATRICS | Facility: CLINIC | Age: 49
End: 2019-05-17
Payer: COMMERCIAL

## 2019-05-17 VITALS
TEMPERATURE: 98 F | WEIGHT: 231 LBS | DIASTOLIC BLOOD PRESSURE: 76 MMHG | SYSTOLIC BLOOD PRESSURE: 120 MMHG | HEIGHT: 67 IN | BODY MASS INDEX: 36.26 KG/M2 | HEART RATE: 66 BPM | OXYGEN SATURATION: 98 %

## 2019-05-17 DIAGNOSIS — N92.6 IRREGULAR PERIODS: ICD-10-CM

## 2019-05-17 DIAGNOSIS — Z00.00 ROUTINE GENERAL MEDICAL EXAMINATION AT A HEALTH CARE FACILITY: Primary | ICD-10-CM

## 2019-05-17 DIAGNOSIS — Z13.220 SCREENING FOR LIPID DISORDERS: ICD-10-CM

## 2019-05-17 PROBLEM — G43.719 INTRACTABLE CHRONIC MIGRAINE WITHOUT AURA: Status: ACTIVE | Noted: 2019-05-17

## 2019-05-17 PROBLEM — N39.46 MIXED STRESS AND URGE URINARY INCONTINENCE: Status: ACTIVE | Noted: 2019-05-17

## 2019-05-17 LAB
CHOLEST SERPL-MCNC: 198 MG/DL
GLUCOSE SERPL-MCNC: 106 MG/DL (ref 70–99)
HDLC SERPL-MCNC: 68 MG/DL
LDLC SERPL CALC-MCNC: 116 MG/DL
NONHDLC SERPL-MCNC: 130 MG/DL
TRIGL SERPL-MCNC: 71 MG/DL
TSH SERPL DL<=0.005 MIU/L-ACNC: 0.98 MU/L (ref 0.4–4)

## 2019-05-17 PROCEDURE — 36415 COLL VENOUS BLD VENIPUNCTURE: CPT | Performed by: INTERNAL MEDICINE

## 2019-05-17 PROCEDURE — 82947 ASSAY GLUCOSE BLOOD QUANT: CPT | Performed by: INTERNAL MEDICINE

## 2019-05-17 PROCEDURE — 99396 PREV VISIT EST AGE 40-64: CPT | Performed by: INTERNAL MEDICINE

## 2019-05-17 PROCEDURE — 80061 LIPID PANEL: CPT | Performed by: INTERNAL MEDICINE

## 2019-05-17 PROCEDURE — 84443 ASSAY THYROID STIM HORMONE: CPT | Performed by: INTERNAL MEDICINE

## 2019-05-17 ASSESSMENT — ENCOUNTER SYMPTOMS
EYE PAIN: 0
HEMATURIA: 0
CHILLS: 0
FREQUENCY: 1
WEAKNESS: 0
HEADACHES: 1
SHORTNESS OF BREATH: 1
ABDOMINAL PAIN: 0
PARESTHESIAS: 0
FEVER: 0
HEMATOCHEZIA: 0
NERVOUS/ANXIOUS: 1
DIZZINESS: 1
DYSURIA: 0
NAUSEA: 0
CONSTIPATION: 0
DIARRHEA: 0
MYALGIAS: 1
JOINT SWELLING: 0
COUGH: 0
HEARTBURN: 0
PALPITATIONS: 0
BREAST MASS: 0
SORE THROAT: 0
ARTHRALGIAS: 0

## 2019-05-17 ASSESSMENT — MIFFLIN-ST. JEOR: SCORE: 1702.5

## 2019-05-17 NOTE — TELEPHONE ENCOUNTER
Reason for Call:  Form, our goal is to have forms completed with 72 hours, however, some forms may require a visit or additional information.    Type of letter, form or note:  medical    Who is the form from?: Patient    Where did the form come from: Patient or family brought in       What clinic location was the form placed at?: [place]    Where the form was placed: drs box Box/Folder    What number is listed as a contact on the form?: [phone]         Additional comments: Please call pt at [phone] when completed and/or with questions.  Pt will  forms.    Thank you.    Call taken on 5/17/2019 at 3:02 PM by Margie Tineo

## 2019-05-17 NOTE — TELEPHONE ENCOUNTER
Please assist in completing form for pt.    Maribell Doe, RN - Triage  [facility]

## 2019-05-21 NOTE — TELEPHONE ENCOUNTER
Called patient and informed them form is available to be picked up at the [facility]  on the first floor.    Walked form down to  at [facility].     Elizabeth Spring MA 11:18 AM 5/21/2019

## 2019-07-04 ENCOUNTER — OFFICE VISIT (OUTPATIENT)
Dept: URGENT CARE | Facility: URGENT CARE | Age: 49
End: 2019-07-04
Payer: COMMERCIAL

## 2019-07-04 VITALS
DIASTOLIC BLOOD PRESSURE: 68 MMHG | WEIGHT: 232.8 LBS | SYSTOLIC BLOOD PRESSURE: 104 MMHG | RESPIRATION RATE: 18 BRPM | BODY MASS INDEX: 37.01 KG/M2 | OXYGEN SATURATION: 96 % | TEMPERATURE: 98.2 F | HEART RATE: 88 BPM

## 2019-07-04 DIAGNOSIS — L60.0 INGROWN TOENAIL: Primary | ICD-10-CM

## 2019-07-04 PROCEDURE — 99213 OFFICE O/P EST LOW 20 MIN: CPT | Performed by: PHYSICIAN ASSISTANT

## 2019-07-04 NOTE — NURSING NOTE
HPI: Pt presents with c/o ingrown toenail of left hallux-Lateral aspect. Painful-Red around nail.    Soaking w/ epsom salt.    Tylenol has not helped.     Bright Goldsmith CMA ([facility])

## 2019-07-06 NOTE — PROGRESS NOTES
SUBJECTIVE:  Chief Complaint   Patient presents with     Urgent Care     Ingrown Toenail     Hx of getting Removed-Very Painful     Veronicarowena Messina is a 49 year old female presents with a chief complaint of ingrown toenail. Symptoms have been worsening over the last several days.  Has had partial removal in the past.  Advised by  we would not remove in UC today.  Still was hoping we could expedite her podiatry follow up as she is leaving town on Wednesday. No dscharge, some redness which has now resolved.     Past Medical History:   Diagnosis Date     Infertility, female      Migraine      No current outpatient medications on file.     Social History     Tobacco Use     Smoking status: Never Smoker     Smokeless tobacco: Never Used   Substance Use Topics     Alcohol use: No     Comment: occ       ROS:  Review of systems negative except as stated above.    EXAM:   /68 (BP Location: Right arm, Patient Position: Chair, Cuff Size: Adult Large)   Pulse 88   Temp 98.2  F (36.8  C) (Oral)   Resp 18   Wt 105.6 kg (232 lb 12.8 oz)   SpO2 96%   BMI 37.01 kg/m    Gen: healthy,alert,no distress  Extremity: toenail deeply submerged into lateral fold.  No erythema, discharge, pus        ASSESSMENT:   No diagnosis found.

## 2019-07-08 ENCOUNTER — OFFICE VISIT (OUTPATIENT)
Dept: PODIATRY | Facility: CLINIC | Age: 49
End: 2019-07-08
Payer: COMMERCIAL

## 2019-07-08 VITALS
SYSTOLIC BLOOD PRESSURE: 104 MMHG | WEIGHT: 232 LBS | DIASTOLIC BLOOD PRESSURE: 69 MMHG | HEIGHT: 67 IN | BODY MASS INDEX: 36.41 KG/M2 | HEART RATE: 86 BPM

## 2019-07-08 DIAGNOSIS — L60.0 INGROWING NAIL WITH INFECTION: Primary | ICD-10-CM

## 2019-07-08 PROCEDURE — 99213 OFFICE O/P EST LOW 20 MIN: CPT | Mod: 25 | Performed by: PODIATRIST

## 2019-07-08 PROCEDURE — 11750 EXCISION NAIL&NAIL MATRIX: CPT | Mod: TA | Performed by: PODIATRIST

## 2019-07-08 RX ORDER — CEPHALEXIN 500 MG/1
500 CAPSULE ORAL 3 TIMES DAILY
Qty: 21 CAPSULE | Refills: 0 | Status: SHIPPED | OUTPATIENT
Start: 2019-07-08 | End: 2019-12-02

## 2019-07-08 ASSESSMENT — MIFFLIN-ST. JEOR: SCORE: 1707.04

## 2019-07-08 NOTE — PATIENT INSTRUCTIONS
Thank you for choosing [facility]!    Follow up in 2 weeks    DR. ROJAS'S CLINIC LOCATIONS     MONDAY  [place] TUESDAY & FRIDAY AM  [place]   [address] [facility]   [address]   [place] [address] [place] [address]   [phone]  [phone]  [phone]       WEDNESDAY  [place] SCHEDULE SURGERY: [phone]   [address] [place] [address] APPOINTMENTS: [phone]   [place] [address] BILLING QUESTIONS: [phone]   [phone]         INGROWN TOENAIL  When a toenail is ingrown, it is curved and grows into the skin, usually at the nail borders (the sides of the nail). This  digging in  of the nail irritates the skin, often creating pain, redness, swelling, and warmth in the toe.  If an ingrown nail causes a break in the skin, bacteria may enter and cause an infection in the area, which is often marked by drainage and a foul odor. However, even if the toe isn t painful, red, swollen, or warm, a nail that curves downward into the skin can progress to an infection.  CAUSES  Causes of ingrown toenails include:    Heredity. In many people, the tendency for ingrown toenails is inherited.     Trauma. Sometimes an ingrown toenail is the result of trauma, such as stubbing your toe, having an object fall on your toe, or engaging in activities that involve repeated pressure on the toes, such as kicking or running.     Improper trimming. The most common cause of ingrown toenails is cutting your nails too short. This encourages the skin next to the nail to fold over the nail.     Improperly sized footwear. Ingrown toenails can result from wearing socks and shoes that are tight or short.     Nail Conditions. Ingrown toenails can be caused by nail problems, such as fungal infections or losing a nail due to trauma.   TREATMENT  Sometimes initial treatment for ingrown toenails can be safely performed at home. However, home treatment is  strongly discouraged if an infection is suspected, or for those who have medical conditions that put feet at high risk, such as diabetes, nerve damage in the foot, or poor circulation.  Home care:  If you don t have an infection or any of the above medical conditions, you can soak your foot in room-temperature water (adding Epsom s salt may be recommended by your doctor), and gently massage the side of the nail fold to help reduce the inflammation.  Avoid attempting  bathroom surgery.  Repeated cutting of the nail can cause the condition to worsen over time. If your symptoms fail to improve, it s time to see a foot and ankle surgeon.  Physician care:  After examining the toe, the foot and ankle surgeon will select the treatment best suited for you. If an infection is present, an oral antibiotic may be prescribed.  Sometimes a minor surgical procedure, often performed in the office, will ease the pain and remove the offending nail. After applying a local anesthetic, the doctor removes part of the nail s side border. Some nails may become ingrown again, requiring removal of the nail root.  Following the nail procedure, a light bandage will be applied. Most people experience very little pain after surgery and may resume normal activity the next day. If your surgeon has prescribed an oral antibiotic, be sure to take all the medication, even if your symptoms have improved.    PREVENTION  Many cases of ingrown toenails may be prevented by:    Proper trimming. Cut toenails in a fairly straight line, and don t cut them too short. You should be able to get your fingernail under the sides and end of the nail.     Well-fitted shoes and socks. Don t wear shoes that are short or tight in the toe area. Avoid shoes that are loose, because they too cause pressure on the toes, especially when running or walking briskly.     INGROWN TOENAIL POSTOPERATIVE INSTRUCTIONS   (Nail avulsion or chemical matrixectomy)   Go directly home and  elevate the affected foot on one or two pillows for the remainder of the day/evening. Your toe may stay numb for the next 2-8 hours.   Take Tylenol, ibuprofen or another anti-inflammatory as needed for pain.   Take antibiotic if that has been prescribed. Take the entire prescribed antibiotic even if your symptoms have improved.   Keep dressing dry and intact the day of the procedure. The morning after the procedure, remove entire dressing and soak/wash the affected area in warm water (you may add Epsom salt) for 5 to 10 minutes. Do this twice a day for 2-4 weeks (6-8 weeks if you had phenol) (you may count showering/bathing as one soak).  After soaks, pat the area dry and then allow to airdry for a few minutes. Apply antibiotic ointment to the area and cover with 2 X 2 gauze and paper tape or a band-aid.  May walk and pursue everyday activities as tolerated with either an open toe shoe or cut-out shoe as needed. May wear regular shoes if no pain is noted.  Watch for any signs and symptoms of infection such as: redness, red streaks going up the foot/leg, swelling, pus or foul odor. Those that have had the phenol procedure, the toe will drain longer and will look like it is infected because it is a chemical burn.  Please call with questions.      BODY WEIGHT AND YOUR FEET  The following information is included in the after visit summary for all patients. Body weight can be a sensitive issue to discuss in clinic, but we think the following information is very important. Although we focus on the feet and ankles, we do support the overall health of our patients.     Many things can cause foot and ankle problems. Foot structure, activity level, foot mechanics and injuries are common causes of pain. One very important issue that often goes unmentioned, is body weight. Extra weight can cause increased stress on muscles, ligaments, bones and tendons. Sometimes just a few extra pounds is all it takes to put one over her/his  threshold. Without reducing that stress, it can be difficult to alleviate pain. As Foot & Ankle specialists, our job is addressing the lower extremity problem and possible causes. Regarding extra body weight, we encourage patients to discuss diet and weight management plans with their primary care doctors. It is this team approach that gives you the best opportunity for pain relief and getting you back on your feet.      [facility] has a Comprehensive Weight Management Program. This program includes counseling, education, non-surgical and surgical approaches to weight loss. If you are interested in learning more either talk to you primary care provider or call [phone].

## 2019-07-08 NOTE — LETTER
"    7/8/2019         RE: Veronica Messina  [address] [facility] [address]        Dear Colleague,    Thank you for referring your patient, Veronica Messina, to the [facility]. Please see a copy of my visit note below.    PATIENT HISTORY:  Veronica Messina is a 49 year old female who presents to clinic for L 1st toe pain, recurrence.  Had ingrown nail procedure last fall, nonpermanent.  Worsening pain in recent weeks.  L 1st toenail lateral border.  Worse with pressure.  7-9/10 pain.  No injury.  No fevers, chills.  Family hx of DM.  Works full time.     EXAM:Vitals: /69   Pulse 86   Ht 1.689 m (5' 6.5\")   Wt 105.2 kg (232 lb)   BMI 36.88 kg/m     BMI= Body mass index is 36.88 kg/m .    General appearance: Patient is alert and fully cooperative with history & exam.  No sign of distress is noted during the visit.     Dermatologic: L 1st toenail ingrown along lateral border, mild redness.  Small dot of purulent drainage at distal corner.  Pain with pressure.     Vascular: DP & PT pulses are intact & regular on the L. CFT and skin temperature are normal.     Neurologic: Lower extremity sensation is intact to light touch.  No evidence of weakness or contracture in the lower extremities.  No evidence of neuropathy.     Musculoskeletal: Patient is ambulatory without assistive device or brace.  No gross ankle deformity noted.  No foot or ankle joint effusion is noted.     ASSESSMENT: L hallux ingrown nail with infection     PLAN:  Reviewed patient's chart in epic.  Discussed condition and treatment options including pros and cons.    The potential causes and nature of an ingrown toenail were discussed with the patient.  We reviewed the natural history/prognosis of the condition and potential risks if no treatment is provided.      Treatment options discussed included conservative management (oral antibiotics, soaking of foot, adequate width shoes)  as well as surgical management (partial " or total nail removal).  The pros and cons of both forms of treatment were reviewed.      After thorough discussion and answering all questions, the patient elected to proceed with permanent partial nail avulsion.  Discussed risk of slow healing, further infection, recurrence.     Procedure:  In addition to office visit.  After consent, the L 1st toe was anesthetized with 5cc's of 1% lidocaine plain after alcohol prep. Betadine prep performed.  A tourniquet was applied to the toe. Using sterile instrumentation, the lateral border was then raised from the nail bed and then cut the length of the nail.  The offending nail border was then removed.  Three 30 second applications of phenol were applied to the nail bed and nail matrix.  The area was then flushed with copious amounts of alcohol.  Bacitracin was applied to the nail bed.  The tourniquet was removed and a hyperemic response was noted.  Bandage was applied to the toe.  The patient tolerated the procedure and anesthesia well.    Post op instructions provided and discussed with pt.  F/u in 2 wks.  Keflex prescribed.      Abdullahi Hinojosa DPM, FACFAS    Weight management plan: Patient was referred to their PCP to discuss a diet and exercise plan.        Again, thank you for allowing me to participate in the care of your patient.        Sincerely,        Abdullahi Hinojosa DPM

## 2019-07-08 NOTE — PROGRESS NOTES
"PATIENT HISTORY:  Veronica Messina is a 49 year old female who presents to clinic for L 1st toe pain, recurrence.  Had ingrown nail procedure last fall, nonpermanent.  Worsening pain in recent weeks.  L 1st toenail lateral border.  Worse with pressure.  7-9/10 pain.  No injury.  No fevers, chills.  Family hx of DM.  Works full time.     EXAM:Vitals: /69   Pulse 86   Ht 1.689 m (5' 6.5\")   Wt 105.2 kg (232 lb)   BMI 36.88 kg/m    BMI= Body mass index is 36.88 kg/m .    General appearance: Patient is alert and fully cooperative with history & exam.  No sign of distress is noted during the visit.     Dermatologic: L 1st toenail ingrown along lateral border, mild redness.  Small dot of purulent drainage at distal corner.  Pain with pressure.     Vascular: DP & PT pulses are intact & regular on the L. CFT and skin temperature are normal.     Neurologic: Lower extremity sensation is intact to light touch.  No evidence of weakness or contracture in the lower extremities.  No evidence of neuropathy.     Musculoskeletal: Patient is ambulatory without assistive device or brace.  No gross ankle deformity noted.  No foot or ankle joint effusion is noted.     ASSESSMENT: L hallux ingrown nail with infection     PLAN:  Reviewed patient's chart in epic.  Discussed condition and treatment options including pros and cons.    The potential causes and nature of an ingrown toenail were discussed with the patient.  We reviewed the natural history/prognosis of the condition and potential risks if no treatment is provided.      Treatment options discussed included conservative management (oral antibiotics, soaking of foot, adequate width shoes)  as well as surgical management (partial or total nail removal).  The pros and cons of both forms of treatment were reviewed.      After thorough discussion and answering all questions, the patient elected to proceed with permanent partial nail avulsion.  Discussed risk of slow healing, " further infection, recurrence.     Procedure:  In addition to office visit.  After consent, the L 1st toe was anesthetized with 5cc's of 1% lidocaine plain after alcohol prep. Betadine prep performed.  A tourniquet was applied to the toe. Using sterile instrumentation, the lateral border was then raised from the nail bed and then cut the length of the nail.  The offending nail border was then removed.  Three 30 second applications of phenol were applied to the nail bed and nail matrix.  The area was then flushed with copious amounts of alcohol.  Bacitracin was applied to the nail bed.  The tourniquet was removed and a hyperemic response was noted.  Bandage was applied to the toe.  The patient tolerated the procedure and anesthesia well.    Post op instructions provided and discussed with pt.  F/u in 2 wks.  Keflex prescribed.      Abdullahi Hinojosa DPM, FACFAS    Weight management plan: Patient was referred to their PCP to discuss a diet and exercise plan.

## 2019-07-22 ENCOUNTER — OFFICE VISIT (OUTPATIENT)
Dept: FAMILY MEDICINE | Facility: CLINIC | Age: 49
End: 2019-07-22
Payer: COMMERCIAL

## 2019-07-22 ENCOUNTER — OFFICE VISIT (OUTPATIENT)
Dept: PODIATRY | Facility: CLINIC | Age: 49
End: 2019-07-22
Payer: COMMERCIAL

## 2019-07-22 VITALS
BODY MASS INDEX: 36.57 KG/M2 | WEIGHT: 233 LBS | DIASTOLIC BLOOD PRESSURE: 64 MMHG | SYSTOLIC BLOOD PRESSURE: 107 MMHG | HEIGHT: 67 IN | HEART RATE: 78 BPM

## 2019-07-22 VITALS — SYSTOLIC BLOOD PRESSURE: 118 MMHG | DIASTOLIC BLOOD PRESSURE: 62 MMHG

## 2019-07-22 DIAGNOSIS — L60.0 INGROWING NAIL: Primary | ICD-10-CM

## 2019-07-22 DIAGNOSIS — L82.1 SEBORRHEIC KERATOSIS: Primary | ICD-10-CM

## 2019-07-22 PROCEDURE — 99213 OFFICE O/P EST LOW 20 MIN: CPT | Performed by: PODIATRIST

## 2019-07-22 PROCEDURE — 99213 OFFICE O/P EST LOW 20 MIN: CPT | Performed by: FAMILY MEDICINE

## 2019-07-22 ASSESSMENT — MIFFLIN-ST. JEOR: SCORE: 1711.57

## 2019-07-22 NOTE — PATIENT INSTRUCTIONS
"SUN PROTECTION INSTRUCTIONS  Sun damage can lead to skin cancer and premature aging of the skin.      The best way to protect from sun damage to your skin is to avoid the sun during peak hours (10 am - 2 pm) even on overcast days.    Never use tanning beds. Tanning beds are associated with much higher risks of skin cancer.    All tanning damages the skin. Aim for ivory skin year round and you will have less trouble with your skin in years to come. There is no merit in getting \"a base tan\" before a warm weather vacation, as any tanning indicates your body's response to sun damage.    Stop smoking. Smokers have higher rates of skin cancer and also have premature skin wrinkling.    Use UPF sun-protective clothing, which while more expensive initially provides longer lasting coverage without having to worry about remembering to re-apply.  1. Wear a wide-brimmed hat and sunglasses.   2. Wear sun-protective clothing.  [employer] and other [employer] make sun protective clothing that are stylish, comfortable and cool.   [employer] and other [employer] make UV arm sleeves suitable for golfing, gardening and other activities.    Sunscreen instructions:  1. Use sunscreens with Zinc Oxide, Titanium Dioxide or Avobenzone to protect from UVA rays.  2. Use SPF 30-50+ to protect from UVB rays.  3. Re-apply every 2 hours even if water resistant.  4. Apply on your face every day even when cloudy and even in the winter. UVA \"aging rays\" penetrate window glass and are just as strong in the winter as in the summer.    FYI  You should use about 3 tablespoons of sunscreen to protect your whole body. Thus a typical eight ounce bottle of sunscreen should last 4 applications. Remember, that the SPF rating is compromised if you don t apply enough. Most people only apply 1/2 - 1/3 of the amount they need. Also don t forget areas such as your ears, feet, upper back and harder to reach places. Keep in mind that these amounts should be " increased for larger body sizes.    Sunscreens with titanium dioxide and/or zinc oxide in the active ingredients are physical blockers as opposed to chemical blockers. Chemical-free sunscreens should not irritate the skin.    Spray-on sunscreens may be used for touch-up application only, not as a base layer. Also, use with caution around small children due to inhalation risk.    SPF means sun protection factor, which is just the degree to which the sunscreen can protect against UVB rays. There is no rating system for UVA rays. SPF is calculated as the time skin will burn when sunscreen is applied vs. skin without sunscreen.    Water resistant sunscreens should be re-applied every 1-2 hours.    Product Recommendations:    Consider use of sunscreen sticks with Zinc Oxide and Titanium Dioxide active ingredients such as Neutrogena Pure&Free Baby Sunscreen Stick.    Good examples include: Blue Lizard, EltaMD, Solbar    Good daily moisturizers with SPF: Vanicream, CeraVe.    For sensitive skin, consider : SkinMedica Essential Defense Mineral Shield Broad Spectrum SPF 35    Men: consider use of Neutrogena Triple Protect Facial Lotion    Avoid retinyl palmitate products.  Avoid combination products that include both sunscreen and insect repellant, as sunscreen should be applied every 2 hours, but insect repellant should not be applied as frequently.    For more information:  https://www.skincancer.org/prevention/sun-protection/sunscreen/sunscreens-safe-and-effective

## 2019-07-22 NOTE — PROGRESS NOTES
"[facility] - PRIMARY CARE SKIN    CC: Lesion(s)  SUBJECTIVE:   Veronica Messina is a(n) 49 year old female who presents to clinic today because of lesions on the face and abdomen . Present for months, no bleeding or pain.    .    Personal Medical History  Skin cancer: NO  Eczema Psoriasis Autoimmune   NO NO NO   Other:   .    Family Medical History  Skin cancer: YES, maternal GM- nonmelanoma skin cancer  Eczema Psoriasis Autoimmune   NO NO NO   Other: .    Sun Exposure History  Previous history of significant sun exposure:  Blistering sunburns: YES - when younger.  Tanning beds: NO.  Sunscreen usage: SPF-  30     Occupation: indoor     Refer to electronic medical record (EMR) for past medical history and medications.      ROS: 14 point review of systems was negative except the symptoms listed above in the HPI.        OBJECTIVE:   GENERAL: healthy, alert and no distress.  SKIN: Roth Skin Type - II.  Face and Trunk examined. The dermatoscope was used to help evaluate pigmented lesions.  Skin Pertinent Findings:  Right temple- 3 mm coarse texture, flesh colored lesion  Left side of abdomen- 1 cm seborrheic keratosis      ASSESSMENT:     Encounter Diagnosis   Name Primary?     Seborrheic keratosis Yes         PLAN:   Patient Instructions   SUN PROTECTION INSTRUCTIONS  Sun damage can lead to skin cancer and premature aging of the skin.      The best way to protect from sun damage to your skin is to avoid the sun during peak hours (10 am - 2 pm) even on overcast days.    Never use tanning beds. Tanning beds are associated with much higher risks of skin cancer.    All tanning damages the skin. Aim for ivory skin year round and you will have less trouble with your skin in years to come. There is no merit in getting \"a base tan\" before a warm weather vacation, as any tanning indicates your body's response to sun damage.    Stop smoking. Smokers have higher rates of skin cancer and also have premature skin " "wrinkling.    Use UPF sun-protective clothing, which while more expensive initially provides longer lasting coverage without having to worry about remembering to re-apply.  1. Wear a wide-brimmed hat and sunglasses.   2. Wear sun-protective clothing.  [employer] and other [employer] make sun protective clothing that are stylish, comfortable and cool.   "[employer] and other [employer] make UV arm sleeves suitable for golfing, gardening and other activities.    Sunscreen instructions:  1. Use sunscreens with Zinc Oxide, Titanium Dioxide or Avobenzone to protect from UVA rays.  2. Use SPF 30-50+ to protect from UVB rays.  3. Re-apply every 2 hours even if water resistant.  4. Apply on your face every day even when cloudy and even in the winter. UVA \"aging rays\" penetrate window glass and are just as strong in the winter as in the summer.    FYI  You should use about 3 tablespoons of sunscreen to protect your whole body. Thus a typical eight ounce bottle of sunscreen should last 4 applications. Remember, that the SPF rating is compromised if you don t apply enough. Most people only apply 1/2 - 1/3 of the amount they need. Also don t forget areas such as your ears, feet, upper back and harder to reach places. Keep in mind that these amounts should be increased for larger body sizes.    Sunscreens with titanium dioxide and/or zinc oxide in the active ingredients are physical blockers as opposed to chemical blockers. Chemical-free sunscreens should not irritate the skin.    Spray-on sunscreens may be used for touch-up application only, not as a base layer. Also, use with caution around small children due to inhalation risk.    SPF means sun protection factor, which is just the degree to which the sunscreen can protect against UVB rays. There is no rating system for UVA rays. SPF is calculated as the time skin will burn when sunscreen is applied vs. skin without sunscreen.    Water resistant sunscreens should be " re-applied every 1-2 hours.    Product Recommendations:    Consider use of sunscreen sticks with Zinc Oxide and Titanium Dioxide active ingredients such as Neutrogena Pure&Free Baby Sunscreen Stick.    Good examples include: Blue Lizard, EltaMD, Solbar    Good daily moisturizers with SPF: Vanicream, CeraVe.    For sensitive skin, consider : SkinMedica Essential Defense Mineral Shield Broad Spectrum SPF 35    Men: consider use of Neutrogena Triple Protect Facial Lotion    Avoid retinyl palmitate products.  Avoid combination products that include both sunscreen and insect repellant, as sunscreen should be applied every 2 hours, but insect repellant should not be applied as frequently.    For more information:  https://www.skincancer.org/prevention/sun-protection/sunscreen/sunscreens-safe-and-effective            TT: 20 minutes.  CT: 15 minutes.

## 2019-07-22 NOTE — LETTER
"    7/22/2019         RE: Veronica Messina  [address] [facility] [address]        Dear Colleague,    Thank you for referring your patient, Veronica Messina, to the [facility]. Please see a copy of my visit note below.    PATIENT HISTORY:  Veronica Messina is a 49 year old female who presents to clinic for recheck of L 1st toenail s/p permanent partial nail avulsion.  No pain.  Healing well.  She has been soaking/dressing the toe.  No fevers, chills, limping.  Nonsmoker.  Works in [employer].  Nondiabetic.      EXAM:Vitals: /64   Pulse 78   Ht 1.689 m (5' 6.5\")   Wt 105.7 kg (233 lb)   BMI 37.04 kg/m     BMI= Body mass index is 37.04 kg/m .    General appearance: Patient is alert and fully cooperative with history & exam.  No sign of distress is noted during the visit.     Dermatologic: L 1st toe procedure site healing well, good resection of offending nail border.  No erythema or signs of infection.     Vascular: DP & PT pulses are intact & regular on the L.  No significant edema or varicosities noted.  CFT and skin temperature are normal.     Neurologic: Lower extremity sensation is intact to light touch.  No evidence of weakness or contracture in the lower extremities.  No evidence of neuropathy.     Musculoskeletal: Patient is ambulatory without assistive device or brace.  No gross ankle deformity noted.  No foot or ankle joint effusion is noted.     ASSESSMENT: s/p L 1st toe permanent partial nail avulsion     PLAN:  Reviewed patient's chart in epic.  Discussed condition and treatment options including pros and cons.    Continue soaking bid and dressing with bacitracin and band aid as directed until healed.  Written instructions given.  Bacitracin and band aid applied.  F/u prn.    Abdullahi Hinojosa DPM, FACFAS    Weight management plan: Patient was referred to their PCP to discuss a diet and exercise plan.        Again, thank you for allowing me to participate in the care of " your patient.        Sincerely,        Abdullahi Hinojosa DPM

## 2019-07-22 NOTE — PROGRESS NOTES
"PATIENT HISTORY:  Veronica Messina is a 49 year old female who presents to clinic for recheck of L 1st toenail s/p permanent partial nail avulsion.  No pain.  Healing well.  She has been soaking/dressing the toe.  No fevers, chills, limping.  Nonsmoker.  Works in [employer].  Nondiabetic.      EXAM:Vitals: /64   Pulse 78   Ht 1.689 m (5' 6.5\")   Wt 105.7 kg (233 lb)   BMI 37.04 kg/m    BMI= Body mass index is 37.04 kg/m .    General appearance: Patient is alert and fully cooperative with history & exam.  No sign of distress is noted during the visit.     Dermatologic: L 1st toe procedure site healing well, good resection of offending nail border.  No erythema or signs of infection.     Vascular: DP & PT pulses are intact & regular on the L.  No significant edema or varicosities noted.  CFT and skin temperature are normal.     Neurologic: Lower extremity sensation is intact to light touch.  No evidence of weakness or contracture in the lower extremities.  No evidence of neuropathy.     Musculoskeletal: Patient is ambulatory without assistive device or brace.  No gross ankle deformity noted.  No foot or ankle joint effusion is noted.     ASSESSMENT: s/p L 1st toe permanent partial nail avulsion     PLAN:  Reviewed patient's chart in epic.  Discussed condition and treatment options including pros and cons.    Continue soaking bid and dressing with bacitracin and band aid as directed until healed.  Written instructions given.  Bacitracin and band aid applied.  F/u prn.    Abdullahi Hinojosa DPM, FACFAS    Weight management plan: Patient was referred to their PCP to discuss a diet and exercise plan.      "

## 2019-07-22 NOTE — PATIENT INSTRUCTIONS
Thank you for choosing [facility]!    Follow up as needed    DR. ROJAS'S CLINIC LOCATIONS     MONDAY  [place] TUESDAY & FRIDAY AM  [place]   [address]   [address]   [place] [address] [place] [address]   [phone]  [phone]  [phone]       WEDNESDAY  [place] SCHEDULE SURGERY: [phone]   [address] [facility] APPOINTMENTS: [phone]   [place] [address] BILLING QUESTIONS: [phone]   [phone]         SOAKING INSTRUCTIONS   - Twice a day soak/wash the affected area in warm water (you may add Epsom salt) for 5 to 10 minutes.  - After soaks, pat the area dry and then allow to airdry for a few minutes.   - Apply antibiotic ointment to the area and cover with 2 x 2 gauze and paper tape or a band-aid.  - You may walk and pursue everyday activities as tolerated with either an open toe shoe or cut-out shoe as needed. Wear regular shoes if no pain is noted.  - Watch for any signs and symptoms of infection such as: redness, red streaks going up the foot/leg, swelling, pus or foul odor.       BODY WEIGHT AND YOUR FEET  The following information is included in the after visit summary for all patients. Body weight can be a sensitive issue to discuss in clinic, but we think the following information is very important. Although we focus on the feet and ankles, we do support the overall health of our patients.     Many things can cause foot and ankle problems. Foot structure, activity level, foot mechanics and injuries are common causes of pain. One very important issue that often goes unmentioned, is body weight. Extra weight can cause increased stress on muscles, ligaments, bones and tendons. Sometimes just a few extra pounds is all it takes to put one over her/his threshold. Without reducing that stress, it can be difficult to alleviate pain. As Foot & Ankle specialists, our job is addressing the  lower extremity problem and possible causes. Regarding extra body weight, we encourage patients to discuss diet and weight management plans with their primary care doctors. It is this team approach that gives you the best opportunity for pain relief and getting you back on your feet.      [facility] has a Comprehensive Weight Management Program. This program includes counseling, education, non-surgical and surgical approaches to weight loss. If you are interested in learning more either talk to you primary care provider or call [phone].

## 2019-07-22 NOTE — LETTER
7/22/2019         RE: Veronica Messina  [address] [facility] [address]        Dear Colleague,    Thank you for referring your patient, Veronica Messina, to the [facility]. Please see a copy of my visit note below.    [facility] - PRIMARY CARE SKIN    CC: Lesion(s)  SUBJECTIVE:   Veronica Messina is a(n) 49 year old female who presents to clinic today because of lesions on the face and abdomen . Present for months, no bleeding or pain.    .    Personal Medical History  Skin cancer: NO  Eczema Psoriasis Autoimmune   NO NO NO   Other:   .    Family Medical History  Skin cancer: YES, maternal GM- nonmelanoma skin cancer  Eczema Psoriasis Autoimmune   NO NO NO   Other: .    Sun Exposure History  Previous history of significant sun exposure:  Blistering sunburns: YES - when younger.  Tanning beds: NO.  Sunscreen usage: SPF-  30     Occupation: indoor     Refer to electronic medical record (EMR) for past medical history and medications.      ROS: 14 point review of systems was negative except the symptoms listed above in the HPI.        OBJECTIVE:   GENERAL: healthy, alert and no distress.  SKIN: Roth Skin Type - II.  Face and Trunk examined. The dermatoscope was used to help evaluate pigmented lesions.  Skin Pertinent Findings:  Right temple- 3 mm coarse texture, flesh colored lesion  Left side of abdomen- 1 cm seborrheic keratosis      ASSESSMENT:     Encounter Diagnosis   Name Primary?     Seborrheic keratosis Yes         PLAN:   Patient Instructions   SUN PROTECTION INSTRUCTIONS  Sun damage can lead to skin cancer and premature aging of the skin.      The best way to protect from sun damage to your skin is to avoid the sun during peak hours (10 am - 2 pm) even on overcast days.    Never use tanning beds. Tanning beds are associated with much higher risks of skin cancer.    All tanning damages the skin. Aim for ivory skin year round and you will have less trouble with your skin  "in years to come. There is no merit in getting \"a base tan\" before a warm weather vacation, as any tanning indicates your body's response to sun damage.    Stop smoking. Smokers have higher rates of skin cancer and also have premature skin wrinkling.    Use UPF sun-protective clothing, which while more expensive initially provides longer lasting coverage without having to worry about remembering to re-apply.  1. Wear a wide-brimmed hat and sunglasses.   2. Wear sun-protective clothing.  [employer] and other [employer] make sun protective clothing that are stylish, comfortable and cool.   [employer] and other [employer] make UV arm sleeves suitable for golfing, gardening and other activities.    Sunscreen instructions:  1. Use sunscreens with Zinc Oxide, Titanium Dioxide or Avobenzone to protect from UVA rays.  2. Use SPF 30-50+ to protect from UVB rays.  3. Re-apply every 2 hours even if water resistant.  4. Apply on your face every day even when cloudy and even in the winter. UVA \"aging rays\" penetrate window glass and are just as strong in the winter as in the summer.    FYI  You should use about 3 tablespoons of sunscreen to protect your whole body. Thus a typical eight ounce bottle of sunscreen should last 4 applications. Remember, that the SPF rating is compromised if you don t apply enough. Most people only apply 1/2 - 1/3 of the amount they need. Also don t forget areas such as your ears, feet, upper back and harder to reach places. Keep in mind that these amounts should be increased for larger body sizes.    Sunscreens with titanium dioxide and/or zinc oxide in the active ingredients are physical blockers as opposed to chemical blockers. Chemical-free sunscreens should not irritate the skin.    Spray-on sunscreens may be used for touch-up application only, not as a base layer. Also, use with caution around small children due to inhalation risk.    SPF means sun protection factor, which is just the " degree to which the sunscreen can protect against UVB rays. There is no rating system for UVA rays. SPF is calculated as the time skin will burn when sunscreen is applied vs. skin without sunscreen.    Water resistant sunscreens should be re-applied every 1-2 hours.    Product Recommendations:    Consider use of sunscreen sticks with Zinc Oxide and Titanium Dioxide active ingredients such as Neutrogena Pure&Free Baby Sunscreen Stick.    Good examples include: Blue Lizard, EltaMD, Solbar    Good daily moisturizers with SPF: Vanicream, CeraVe.    For sensitive skin, consider : SkinMedica Essential Defense Mineral Shield Broad Spectrum SPF 35    Men: consider use of Neutrogena Triple Protect Facial Lotion    Avoid retinyl palmitate products.  Avoid combination products that include both sunscreen and insect repellant, as sunscreen should be applied every 2 hours, but insect repellant should not be applied as frequently.    For more information:  https://www.skincancer.org/prevention/sun-protection/sunscreen/sunscreens-safe-and-effective            TT: 20 minutes.  CT: 15 minutes.          Again, thank you for allowing me to participate in the care of your patient.        Sincerely,        Nini Wood MD

## 2019-10-03 ENCOUNTER — HEALTH MAINTENANCE LETTER (OUTPATIENT)
Age: 49
End: 2019-10-03

## 2019-12-02 ENCOUNTER — OFFICE VISIT (OUTPATIENT)
Dept: PODIATRY | Facility: CLINIC | Age: 49
End: 2019-12-02
Payer: COMMERCIAL

## 2019-12-02 VITALS
SYSTOLIC BLOOD PRESSURE: 112 MMHG | DIASTOLIC BLOOD PRESSURE: 66 MMHG | BODY MASS INDEX: 36.41 KG/M2 | WEIGHT: 232 LBS | HEIGHT: 67 IN

## 2019-12-02 DIAGNOSIS — L60.0 ONYCHOCRYPTOSIS: Primary | ICD-10-CM

## 2019-12-02 PROCEDURE — 99213 OFFICE O/P EST LOW 20 MIN: CPT | Mod: 25 | Performed by: PODIATRIST

## 2019-12-02 PROCEDURE — 11730 AVULSION NAIL PLATE SIMPLE 1: CPT | Mod: T6 | Performed by: PODIATRIST

## 2019-12-02 ASSESSMENT — MIFFLIN-ST. JEOR: SCORE: 1707.04

## 2019-12-02 NOTE — PROGRESS NOTES
"Foot & Ankle Surgery   December 2, 2019    S:  Pt is seen today for evaluation of R 2nd toe pain.  No injury noted.  Was seen in the past for L heel pain and L hallux ingrown nail. Underwent a partial temp and subsequently a partial permanent avulsion of the L great toenail by Figueroa Segura and Ashish, respectively.  Now having medial R 2nd toenail pain.  Started as discomfort, then a blister, and now drainage/pain.  Has a bandaid on.  States this is the toe she had a hmmertoe procedure on. The pin broke, and she states the toe doesn't sit the same as the others, leading to rubbing on the big toe.    Vitals:    12/02/19 1109   BP: 112/66   Weight: 105.2 kg (232 lb)   Height: 1.689 m (5' 6.5\")   '      ROS - Pos for CC.  Patient denies current nausea, vomiting, chills, fevers, belly pain, calf pain, chest pain or SOB.  Complete remainder of ROS it otherwise neg.      PE:  Gen:   No apparent distress  Eye:    Visual scanning without deficit  Ear:    Response to auditory stimuli wnl  Lung:    Non-labored breathing on RA noted  Abd:    NTND per patient report  Lymph:    Neg for pitting/non-pitting edema BLE  Vasc:    Pulses palpable, CFT minimally delayed  Neuro:    Light touch sensation intact to all sensory nerve distributions without paresthesias  Derm:    Onychocryptosis medial R 2nd toe.  Slight serous drainage/inflammation but no cellulitis.    MSK:    R 2nd toe sits slightly dorsally-displaced compared to adjacent toes  Calf:    Neg for redness, swelling or tenderness      Assessment:  49 year old female with onychocryptosis medial R 2nd toe      Plan:  Discussed etiologies, anatomy and options  1.  Onychocryptosis medial R 2nd toe in setting of previous hammertoe surgery  -Regarding the nail, treatment options were discussed.  They elected to proceed with a procedure, Partial temporary avulsion.  See procedure note for details.  Risks that were discussed include but are not limited to infection, wound healing " complications, nerve irritation, recurrence of the ingrown nail and the need for further procedures.  Antibiotic:  None needed    After discussing the procedure, as well as risks, complications and post-procedure instructions, informed consent was obtained.    Anesthesia:  3 1/2 cc's of  1% lidocaine plain    Procedure:  After adequate prep, and with anesthesia achieved,  attention was directed to the medial border of the R 2nd toe where the nail plate was freed from surrounding soft tissue.  The offending border was  using an English Anvil and then removed in total.  The base of the wound was explored and showed no necrotic tissue, purulence or debris.   A clean dressing was applied loosely to prevent vascular insult.  The patient tolerated the procedure well without complications.    Post-procedural instructions were dispensed and discussed with the patient.  All questions were answered.           Follow up:  prn or sooner with acute issues      Body mass index is 36.88 kg/m .  Weight management plan: Patient was referred to their PCP to discuss a diet and exercise plan.         Kam Thurston DPM FACFAS FACFAOM  Podiatric Foot & Ankle Surgeon  [facility]  [phone]

## 2019-12-02 NOTE — PATIENT INSTRUCTIONS
Thank you for choosing [facility]!    DR. GONZALEZ'S CLINIC LOCATIONS:   MONDAY - [place] TUESDAY - [place]   [address] [facility]  [address]   [place] [address] [place] [address]   [phone]       THURSDAY AM - [place] THURSDAY PM - [place]   [address]   [place] [address] [place] [address]   [phone]       FRIDAY AM - [place] SET UP SURGERY: [phone] [address] APPOINTMENTS: [phone]   [place] [address] BILLING QUESTIONS: [phone] FAX NUMBER: [phone]     [place] TOENAIL REMOVAL HOME INSTRUCTIONS  1. After the procedure, go home and elevate the foot/feet for the remainder of the day/evening as able. This is to minimize swelling, control pain, and limit post-procedural complications. The pre-procedural injection may cause your toe to be numb anywhere from 1-2 hours.    2. You can take Tylenol, Ibuprofen, Advil, etc as needed for pain if tolerated. Follow label instructions.     3. If you have been given a prescription for antibiotics, take them as instructed and complete the entire prescription.    4. Keep dressing intact until the following morning. Then remove the bandage (you may need to soak it in warm soapy water as the bandage will likely adhere to your skin).    5. Start soaking in warm soapy water for 5-10 minutes twice a day. Wash the toe thoroughly, dry the toe thoroughly. Apply antibiotic wound ointment to base of wound and cover with gauze and Coban dressing (not too tightly) until it stops draining. This may take a few days to weeks, but at that point, you may continue with antibiotic ointment and a band-aid, or you may stop applying a dressing all together. Dressing changes should be done twice daily if you had the permanent/chemical procedure done.    6. You may do activities as tolerated the following day. Find a shoe that is  comfortable and minimizes the amount of rubbing on your toe, as this may increase pain, swelling, etc.    7. Monitor for signs of infection. With this procedure, it is common to have mild surrounding redness and drainage. If the redness involves the entire toe or if you notice red streaks on top of your foot, or if you experience any nausea, vomiting, chills, fevers > 101 degrees, call clinic for a quick appointment.

## 2020-07-16 ENCOUNTER — OFFICE VISIT (OUTPATIENT)
Dept: PEDIATRICS | Facility: CLINIC | Age: 50
End: 2020-07-16
Payer: COMMERCIAL

## 2020-07-16 VITALS
OXYGEN SATURATION: 97 % | HEIGHT: 67 IN | SYSTOLIC BLOOD PRESSURE: 138 MMHG | RESPIRATION RATE: 16 BRPM | TEMPERATURE: 97.6 F | WEIGHT: 247 LBS | DIASTOLIC BLOOD PRESSURE: 77 MMHG | BODY MASS INDEX: 38.77 KG/M2 | HEART RATE: 64 BPM

## 2020-07-16 DIAGNOSIS — M54.2 NECK PAIN: ICD-10-CM

## 2020-07-16 DIAGNOSIS — Z00.00 ROUTINE GENERAL MEDICAL EXAMINATION AT A HEALTH CARE FACILITY: Primary | ICD-10-CM

## 2020-07-16 DIAGNOSIS — Z12.31 ENCOUNTER FOR SCREENING MAMMOGRAM FOR BREAST CANCER: ICD-10-CM

## 2020-07-16 DIAGNOSIS — E66.09 NON MORBID OBESITY DUE TO EXCESS CALORIES: ICD-10-CM

## 2020-07-16 DIAGNOSIS — M54.42 CHRONIC LEFT-SIDED LOW BACK PAIN WITH LEFT-SIDED SCIATICA: ICD-10-CM

## 2020-07-16 DIAGNOSIS — G89.29 CHRONIC LEFT-SIDED LOW BACK PAIN WITH LEFT-SIDED SCIATICA: ICD-10-CM

## 2020-07-16 PROCEDURE — 99213 OFFICE O/P EST LOW 20 MIN: CPT | Mod: 25 | Performed by: STUDENT IN AN ORGANIZED HEALTH CARE EDUCATION/TRAINING PROGRAM

## 2020-07-16 PROCEDURE — 99396 PREV VISIT EST AGE 40-64: CPT | Performed by: STUDENT IN AN ORGANIZED HEALTH CARE EDUCATION/TRAINING PROGRAM

## 2020-07-16 PROCEDURE — 36415 COLL VENOUS BLD VENIPUNCTURE: CPT | Performed by: INTERNAL MEDICINE

## 2020-07-16 PROCEDURE — 80061 LIPID PANEL: CPT | Performed by: INTERNAL MEDICINE

## 2020-07-16 PROCEDURE — 80053 COMPREHEN METABOLIC PANEL: CPT | Performed by: INTERNAL MEDICINE

## 2020-07-16 ASSESSMENT — MIFFLIN-ST. JEOR: SCORE: 1770.07

## 2020-07-16 NOTE — PROGRESS NOTES
"   SUBJECTIVE:   CC: Veronica Messina is an 50 year old woman who presents for preventive health visit.     Healthy Habits:    Getting at least 3 servings of Calcium per day:  NO    Bi-annual eye exam:  Yes    Dental care twice a year:  Yes    Sleep apnea or symptoms of sleep apnea:  None    Diet:  Regular (no restrictions)    Frequency of exercise:  2-3 days/week    Duration of exercise:  15-30 minutes    Taking medications regularly:  Not Applicable    Barriers to taking medications:  Not applicable    Medication side effects:  Not applicable    PHQ-2 Total Score:    Additional concerns today:  No    Turning 51 in January and has been feeling like she is \"falling apart\" thinking about turning 51.  had a heart attack 14 months ago that was a lot of stress (financial, emotional) for the family. He had three stents placed and is now doing well. He lost some weight from diet, exercise, ultimately used weight loss medication. Recently \"passed\" cardiac stress test. Has been a lot of emotional stress on the family - worry about losing her , her daughter losing her dad, coming to terms with her own mortality and comorbidities.     Concerned about how much weight she has gained in last few years - due to sedentary job, lack of exercise, lots of fast food. In the last month she has been making dietary changes, notably less fast food. Getting back to treadmill workouts and Pilates regularly as well. She successfully lost weight ~15 years ago (65 lbs) while trying to get pregnant, so she feels confident she can do it again. Does not want to use medications to lose weight unless she really has to.    Has some lower back pain and left hip pain. She thinks it started a year ago - initially blamed it on extra weight. Has not improved with walking and being more active. Always located on left side (low back and hip). A \"twinge\" is always present. She had \"sciatic nerve issues\" when she was in her 20s. Pain in her " "left glute feels like a \"deep, sharp\" pain. Does not radiate down her leg.    Having issues with incontinence - primarily having issues with needing to urinate immediately upon feeling like she has to go. Does not have leakage of urine w/ coughing or sneezing. No history of childbirth. She has not tried medication for this but is wondering if weight loss would help.    Briefly discussed stress, anxiety, self-criticism - has not been engaged in therapy in the past but thinks it could be helpful eventually. Feels like she has added several interventions (diet, exercise) recently and would like to think about therapy in the future. Feels she has always been good at forgiving other people but has a hard time forgiving herself for things like weight gain, poor lifestyle decisions.    Grandma passed away from CAD at 72.  Dad w/ valvular heart disease (from rheumatic fever), no known CAD. Has hypertension, hyperlipidemia.  Mother w/ no known CAD. Has diabetes, hypertension.  Brother w/ [place] disease.    Today's PHQ-2 Score:   PHQ-2 ( 1999 [employer]) 7/16/2020   Q1: Little interest or pleasure in doing things 0   Q2: Feeling down, depressed or hopeless 0   PHQ-2 Score 0   Q1: Little interest or pleasure in doing things -   Q2: Feeling down, depressed or hopeless -   PHQ-2 Score -     Abuse: Current or Past(Physical, Sexual or Emotional)? No  Do you feel safe in your environment? Yes    Social History     Tobacco Use     Smoking status: Never Smoker     Smokeless tobacco: Never Used   Substance Use Topics     Alcohol use: No     Comment: occ     If you drink alcohol do you typically have >3 drinks per day or >7 drinks per week? No    Alcohol Use 5/15/2019   Prescreen: >3 drinks/day or >7 drinks/week? No   Prescreen: >3 drinks/day or >7 drinks/week? -     Reviewed orders with patient.  Reviewed health maintenance and updated orders accordingly - Yes    Mammogram Screening: Patient under age 50, mutual decision reflected in " "health maintenance.    Pertinent mammograms are reviewed under the imaging tab.  History of abnormal Pap smear: NO - age 30-65 PAP every 5 years with negative HPV co-testing recommended     Reviewed and updated as needed this visit by clinical staff  Tobacco  Allergies  Meds  Med Hx  Surg Hx  Fam Hx  Soc Hx        Reviewed and updated as needed this visit by Provider          Review of Systems  Constitutional, neuro, ENT, endocrine, pulmonary, cardiac, gastrointestinal, genitourinary, musculoskeletal, integument and psychiatric systems are negative, except as otherwise noted.     OBJECTIVE:   /77 (Cuff Size: Adult Large)   Pulse 64   Temp 97.6  F (36.4  C) (Tympanic)   Resp 16   Ht 1.689 m (5' 6.5\")   Wt 112 kg (247 lb)   SpO2 97%   BMI 39.27 kg/m    Physical Exam  GENERAL: overweight, alert, and no distress; occasionally tearful throughout interview  NECK: no adenopathy, no asymmetry, masses, or scars and thyroid normal to palpation  RESP: lungs clear to auscultation - no rales, rhonchi or wheezes  CV: regular rate and rhythm, normal S1 S2, no S3 or S4, no murmur, click or rub, trace peripheral edema  ABDOMEN: soft, nontender, no hepatosplenomegaly, no masses and bowel sounds normal  MS: no gross musculoskeletal defects noted, no edema  NEURO: normal strength and tone, mentation intact and speech normal  PSYCH: mentation appears normal, affect normal/bright    Diagnostic Test Results:  Labs reviewed in Epic  No results found for this or any previous visit (from the past 24 hour(s)).    ASSESSMENT/PLAN:   1. Routine general medical examination at a health care facility  - Spent a lot of time today discussing weight loss - she has started this process w/ diet changes, exercise - and will plan to check in regularly re: weight loss  - Up to date on Pap/HPV; will be due for colonoscopy (first one) next year   - Mammogram ordered today  - RTC in 3-4 months for symptom follow up (weight loss, back pain, " "incontinence); will re-address therapy, additional interventions for weight loss at that time    2. Chronic left-sided low back pain with left-sided sciatica  Left-sided back pain w/ radiation to buttocks on left side. Symptoms would likely get better w/ weight loss and ongoing exercise, though may have component of sciatic nerve involvement given unilateral nature and history of sciatic nerve-related pain. She is agreeable to PT referral today for this and will continue w/ weight loss.  - [facility], AND CHIROPRACTIC REFERRAL; Future    3. Obesity  - Lipid Profile (Chol, Trig, HDL, LDL calc) - normal  - Comprehensive metabolic panel (BMP + Alb, Alk Phos, ALT, AST, Total. Bili, TP) - normal    4. Encounter for screening mammogram for breast cancer  - *MA Screening Digital Bilateral; Future    COUNSELING:  Reviewed preventive health counseling, as reflected in patient instructions       Regular exercise       Healthy diet/nutrition       Osteoporosis Prevention/Bone Health    Estimated body mass index is 39.27 kg/m  as calculated from the following:    Height as of this encounter: 1.689 m (5' 6.5\").    Weight as of this encounter: 112 kg (247 lb).    Weight management plan: Discussed healthy diet and exercise guidelines     reports that she has never smoked. She has never used smokeless tobacco.    Counseling Resources:  ATP IV Guidelines  Pooled Cohorts Equation Calculator  Breast Cancer Risk Calculator  FRAX Risk Assessment  ICSI Preventive Guidelines  Dietary Guidelines for Americans, 2010  USDA's MyPlate  ASA Prophylaxis  Lung CA Screening    Mary Mena MD  [facility]    ===========  STAFF NOTE:  Patient seen with resident physician today.  I was physically present during key portions of the visit and participated in the evaluation and management of the patient today.     Marcin Saul MD   "

## 2020-07-16 NOTE — PATIENT INSTRUCTIONS
Veronica, it was great to meet you today. I commend you for your commitment to weight loss and I look forward to seeing how your symptoms improve with weight loss! Please visit the lab downstairs today after this appointment - I'll let you know how your labs look when we get them back. I put in a referral to physical therapy for your back pain.    Plan to come back for a follow up visit in 2-3 months - we can check in on your weight loss, incontinence, and back pain then. Let us know if you need anything before then!    Preventive Health Recommendations  Female Ages 40 to 49    Yearly exam:     See your health care provider every year in order to  1. Review health changes.   2. Discuss preventive care.    3. Review your medicines if your doctor prescribed any.      Get a Pap test every three years (unless you have an abnormal result and your provider advises testing more often).      If you get Pap tests with HPV test, you only need to test every 5 years, unless you have an abnormal result. You do not need a Pap test if your uterus was removed (hysterectomy) and you have not had cancer.      You should be tested each year for STDs (sexually transmitted diseases), if you're at risk.     Ask your doctor if you should have a mammogram.      Have a colonoscopy (test for colon cancer) if someone in your family has had colon cancer or polyps before age 50.       Have a cholesterol test every 5 years.       Have a diabetes test (fasting glucose) after age 45. If you are at risk for diabetes, you should have this test every 3 years.    Shots: Get a flu shot each year. Get a tetanus shot every 10 years.     Nutrition:     Eat at least 5 servings of fruits and vegetables each day.    Eat whole-grain bread, whole-wheat pasta and brown rice instead of white grains and rice.    Get adequate Calcium and Vitamin D.      Lifestyle    Exercise at least 150 minutes a week (an average of 30 minutes a day, 5 days a week). This will help  you control your weight and prevent disease.    Limit alcohol to one drink per day.    No smoking.     Wear sunscreen to prevent skin cancer.    See your dentist every six months for an exam and cleaning.

## 2020-07-17 LAB
ALBUMIN SERPL-MCNC: 3.8 G/DL (ref 3.4–5)
ALP SERPL-CCNC: 76 U/L (ref 40–150)
ALT SERPL W P-5'-P-CCNC: 22 U/L (ref 0–50)
ANION GAP SERPL CALCULATED.3IONS-SCNC: 7 MMOL/L (ref 3–14)
AST SERPL W P-5'-P-CCNC: 20 U/L (ref 0–45)
BILIRUB SERPL-MCNC: 0.2 MG/DL (ref 0.2–1.3)
BUN SERPL-MCNC: 12 MG/DL (ref 7–30)
CALCIUM SERPL-MCNC: 9.2 MG/DL (ref 8.5–10.1)
CHLORIDE SERPL-SCNC: 108 MMOL/L (ref 94–109)
CHOLEST SERPL-MCNC: 161 MG/DL
CO2 SERPL-SCNC: 26 MMOL/L (ref 20–32)
CREAT SERPL-MCNC: 0.71 MG/DL (ref 0.52–1.04)
GFR SERPL CREATININE-BSD FRML MDRD: >90 ML/MIN/{1.73_M2}
GLUCOSE SERPL-MCNC: 91 MG/DL (ref 70–99)
HDLC SERPL-MCNC: 52 MG/DL
LDLC SERPL CALC-MCNC: 96 MG/DL
NONHDLC SERPL-MCNC: 109 MG/DL
POTASSIUM SERPL-SCNC: 4.2 MMOL/L (ref 3.4–5.3)
PROT SERPL-MCNC: 8.1 G/DL (ref 6.8–8.8)
SODIUM SERPL-SCNC: 141 MMOL/L (ref 133–144)
TRIGL SERPL-MCNC: 63 MG/DL

## 2020-07-21 DIAGNOSIS — Z82.49 FAMILY HISTORY OF CORONARY ARTERY DISEASE: Primary | ICD-10-CM

## 2020-07-29 ENCOUNTER — THERAPY VISIT (OUTPATIENT)
Dept: PHYSICAL THERAPY | Facility: CLINIC | Age: 50
End: 2020-07-29
Attending: INTERNAL MEDICINE
Payer: COMMERCIAL

## 2020-07-29 DIAGNOSIS — M54.50 ACUTE LOW BACK PAIN, UNSPECIFIED BACK PAIN LATERALITY, UNSPECIFIED WHETHER SCIATICA PRESENT: ICD-10-CM

## 2020-07-29 DIAGNOSIS — M25.552 HIP PAIN, LEFT: ICD-10-CM

## 2020-07-29 DIAGNOSIS — G89.29 CHRONIC LEFT-SIDED LOW BACK PAIN WITH LEFT-SIDED SCIATICA: ICD-10-CM

## 2020-07-29 DIAGNOSIS — M54.42 CHRONIC LEFT-SIDED LOW BACK PAIN WITH LEFT-SIDED SCIATICA: ICD-10-CM

## 2020-07-29 DIAGNOSIS — M54.2 NECK PAIN: ICD-10-CM

## 2020-07-29 PROCEDURE — 97161 PT EVAL LOW COMPLEX 20 MIN: CPT | Mod: GP | Performed by: PHYSICAL THERAPIST

## 2020-07-29 PROCEDURE — 97110 THERAPEUTIC EXERCISES: CPT | Mod: GP | Performed by: PHYSICAL THERAPIST

## 2020-07-29 ASSESSMENT — ACTIVITIES OF DAILY LIVING (ADL)
LIGHT_TO_MODERATE_WORK: SLIGHT DIFFICULTY
HOS_ADL_ITEM_SCORE_TOTAL: 48
STEPPING_UP_AND_DOWN_CURBS: SLIGHT DIFFICULTY
ROLLING_OVER_IN_BED: MODERATE DIFFICULTY
DEEP_SQUATTING: MODERATE DIFFICULTY
RECREATIONAL_ACTIVITIES: SLIGHT DIFFICULTY
HEAVY_WORK: SLIGHT DIFFICULTY
WALKING_APPROXIMATELY_10_MINUTES: SLIGHT DIFFICULTY
HOW_WOULD_YOU_RATE_YOUR_CURRENT_LEVEL_OF_FUNCTION_DURING_YOUR_USUAL_ACTIVITIES_OF_DAILY_LIVING_FROM_0_TO_100_WITH_100_BEING_YOUR_LEVEL_OF_FUNCTION_PRIOR_TO_YOUR_HIP_PROBLEM_AND_0_BEING_THE_INABILITY_TO_PERFORM_ANY_OF_YOUR_USUAL_DAILY_ACTIVITIES?: 80
WALKING_INITIALLY: SLIGHT DIFFICULTY
GOING_DOWN_1_FLIGHT_OF_STAIRS: NO DIFFICULTY AT ALL
WALKING_DOWN_STEEP_HILLS: NO DIFFICULTY AT ALL
HOS_ADL_HIGHEST_POTENTIAL_SCORE: 64
GETTING_INTO_AND_OUT_OF_AN_AVERAGE_CAR: NO DIFFICULTY AT ALL
TWISTING/PIVOTING_ON_INVOLVED_LEG: SLIGHT DIFFICULTY
PUTTING_ON_SOCKS_AND_SHOES: SLIGHT DIFFICULTY
WALKING_UP_STEEP_HILLS: SLIGHT DIFFICULTY
WALKING_15_MINUTES_OR_GREATER: MODERATE DIFFICULTY
HOS_ADL_SCORE(%): 75
HOS_ADL_COUNT: 16
GOING_UP_1_FLIGHT_OF_STAIRS: SLIGHT DIFFICULTY
GETTING_INTO_AND_OUT_OF_A_BATHTUB: SLIGHT DIFFICULTY

## 2020-07-29 NOTE — PROGRESS NOTES
[facility] Initial Evaluation  Subjective:  Pt reports that she's always had LBP - chronic.    The history is provided by the patient. No  was used.   Patient Health History  Veronica Messina being seen for Physical Therapy to help with left side hip & neck pain.     Problem began: 7/9/2020.   Problem occurred: age   Pain is reported as 8/10 (at worst with lying on L side at neck/hip) on pain scale.  General health as reported by patient is good.  Pertinent medical history includes: menopausal, migraines/headaches, numbness/tingling and overweight.   Red flags:  None as reported by patient.  Medical allergies: none.   Surgeries include:  Orthopedic surgery.    Current medications:  None.    Current occupation is .   Primary job tasks include:  Computer work.                  Therapist Generated HPI Evaluation         Type of problem:  Lumbar.    This is a new condition.  Condition occurred with:  Insidious onset.  Where condition occurred: for unknown reasons.  Patient reports pain:  Lumbar spine left.  Pain is described as aching and stabbing (starts as a dull pain and then increase to a stabbing pain) and is intermittent.  Radiates to: L lateral hip. Pain is worse in the A.M..  Since onset symptoms are unchanged.  Symptoms are exacerbated by lying down, standing, sitting and walking (lying on L side, the first 15 mn of walking is painful but improves the more she goes)  and relieved by heat (exercise and massage, lies on back or R side for sleeping).      Restrictions due to condition include:  Working in normal job without restrictions.  Barriers include:  None as reported by patient.    Therapist Generated HPI Evaluation         Type of problem:  Cervical spine.    This is a chronic condition.    Where condition occurred: for unknown reasons.  Patient reports pain:  Cervical left side and cervical right side (L side worse at night).  Pain is described as  aching and stabbing (starts as a dull pain and then increase to a stabbing pain) and is intermittent.  Pain radiates to:  Upper arm left and elbow left. Pain is worse in the A.M..  Since onset symptoms are unchanged.  Associated symptoms:  Headache (if pain gets bad she can get a HA). Symptoms are exacerbated by lying down and sitting (lying on L side)        Restrictions due to condition include:  Working in normal job without restrictions.  Barriers include:  None as reported by patient.                        Objective:  System    Physical Exam    [place] Cervical Evaluation    Posture:  Sitting: poor    Protruding Head: yes    Correction of Posture: no effect    Movement Loss:    Flexion (Flex): nil  Retraction (RET): min  Extension (EXT): min and pain  Lateral Flexion Right (LF R): min  Lateral Flexion Left (LF L): nil  Rotation Right (ROT R): nil  Rotation Left (ROT L): nil  Test Movements:      RET: During: no effect  After: no effect  Mechanical Response: no effect  Repeat RET: During: no effect  After: no effect  Mechanical Response: no effect            LF R: During: produces  After: centralizing  Mechanical Response: IncROM  Repeat LF R: During: produces  After: centralizing  Mechanical Response: IncROM              Conclusion: derangement  Principle of Treatment:        Lateral: repeated R SB x 10 reps every 3 hrs    [place] Lumbar Evaluation    Posture:  Sitting: fair            Movement Loss:  Flexion (Flex): min  Extension (EXT): min and pain  Side [place] R (SG R): nil  Side [place] L (SG L): nil  Test Movements:        EIL: During: decreases  After: better  Mechanical Response: no effect  Repeat EIL: During: decreases  After: better  Mechanical Response: no effect        Conclusion: derangement  Principle of Treatment:      Extension: press-ups x 10 reps, 3-4 times/day                                           ROS    Assessment/Plan:    Patient is a 50 year old female with cervical and lumbar  complaints.    Patient has the following significant findings with corresponding treatment plan.                Diagnosis 1:  Cervical pain secondary to derangement  Pain -  directional preference exercise and home program  Decreased ROM/flexibility - manual therapy, therapeutic exercise and home program  Decreased function - therapeutic activities and home program  Impaired posture - neuro re-education and home program  Diagnosis 2:  LBP/hip pain secondary to lumbar derangement vs hip derangement   Pain -  US, manual therapy, directional preference exercise and home program  Decreased ROM/flexibility - manual therapy, therapeutic exercise and home program  Inflammation - US and iontophoresis  Impaired muscle performance - neuro re-education and home program  Decreased function - therapeutic activities and home program    Therapy Evaluation Codes:   Cumulative Therapy Evaluation is: Low complexity.    Previous and current functional limitations:  (See Goal Flow Sheet for this information)    Short term and Long term goals: (See Goal Flow Sheet for this information)     Communication ability:  Patient appears to be able to clearly communicate and understand verbal and written communication and follow directions correctly.  Treatment Explanation - The following has been discussed with the patient:   RX ordered/plan of care  Anticipated outcomes  Possible risks and side effects  This patient would benefit from PT intervention to resume normal activities.   Rehab potential is good.    Frequency:  1 X week, once daily  Duration:  for 6-8 weeks  Discharge Plan:  Achieve all LTG.  Independent in home treatment program.  Reach maximal therapeutic benefit.    Please refer to the daily flowsheet for treatment today, total treatment time and time spent performing 1:1 timed codes.

## 2020-08-04 ENCOUNTER — THERAPY VISIT (OUTPATIENT)
Dept: PHYSICAL THERAPY | Facility: CLINIC | Age: 50
End: 2020-08-04
Attending: INTERNAL MEDICINE
Payer: COMMERCIAL

## 2020-08-04 DIAGNOSIS — M25.552 HIP PAIN, LEFT: ICD-10-CM

## 2020-08-04 DIAGNOSIS — M54.50 ACUTE LOW BACK PAIN, UNSPECIFIED BACK PAIN LATERALITY, UNSPECIFIED WHETHER SCIATICA PRESENT: ICD-10-CM

## 2020-08-04 DIAGNOSIS — M54.2 NECK PAIN: ICD-10-CM

## 2020-08-04 PROCEDURE — 97140 MANUAL THERAPY 1/> REGIONS: CPT | Mod: GP | Performed by: PHYSICAL THERAPIST

## 2020-08-04 PROCEDURE — 97110 THERAPEUTIC EXERCISES: CPT | Mod: GP | Performed by: PHYSICAL THERAPIST

## 2020-08-04 PROCEDURE — 97112 NEUROMUSCULAR REEDUCATION: CPT | Mod: GP | Performed by: PHYSICAL THERAPIST

## 2020-08-13 ENCOUNTER — HOSPITAL ENCOUNTER (OUTPATIENT)
Dept: MAMMOGRAPHY | Facility: CLINIC | Age: 50
Discharge: HOME | End: 2020-08-13
Attending: INTERNAL MEDICINE | Admitting: INTERNAL MEDICINE
Payer: COMMERCIAL

## 2020-08-13 DIAGNOSIS — Z12.31 ENCOUNTER FOR SCREENING MAMMOGRAM FOR BREAST CANCER: ICD-10-CM

## 2020-08-13 PROCEDURE — 77067 SCR MAMMO BI INCL CAD: CPT

## 2020-08-18 ENCOUNTER — THERAPY VISIT (OUTPATIENT)
Dept: PHYSICAL THERAPY | Facility: CLINIC | Age: 50
End: 2020-08-18
Payer: COMMERCIAL

## 2020-08-18 DIAGNOSIS — M25.552 HIP PAIN, LEFT: ICD-10-CM

## 2020-08-18 DIAGNOSIS — M54.2 NECK PAIN: ICD-10-CM

## 2020-08-18 DIAGNOSIS — M54.50 ACUTE LOW BACK PAIN, UNSPECIFIED BACK PAIN LATERALITY, UNSPECIFIED WHETHER SCIATICA PRESENT: ICD-10-CM

## 2020-08-18 PROCEDURE — 97140 MANUAL THERAPY 1/> REGIONS: CPT | Mod: GP | Performed by: PHYSICAL THERAPIST

## 2020-08-18 PROCEDURE — 97112 NEUROMUSCULAR REEDUCATION: CPT | Mod: GP | Performed by: PHYSICAL THERAPIST

## 2020-08-18 PROCEDURE — 97110 THERAPEUTIC EXERCISES: CPT | Mod: GP | Performed by: PHYSICAL THERAPIST

## 2020-08-25 ENCOUNTER — THERAPY VISIT (OUTPATIENT)
Dept: PHYSICAL THERAPY | Facility: CLINIC | Age: 50
End: 2020-08-25
Payer: COMMERCIAL

## 2020-08-25 DIAGNOSIS — M25.552 HIP PAIN, LEFT: ICD-10-CM

## 2020-08-25 DIAGNOSIS — M54.50 ACUTE LOW BACK PAIN, UNSPECIFIED BACK PAIN LATERALITY, UNSPECIFIED WHETHER SCIATICA PRESENT: ICD-10-CM

## 2020-08-25 DIAGNOSIS — M54.2 NECK PAIN: ICD-10-CM

## 2020-08-25 PROCEDURE — 97140 MANUAL THERAPY 1/> REGIONS: CPT | Mod: GP | Performed by: PHYSICAL THERAPIST

## 2020-08-25 PROCEDURE — 97110 THERAPEUTIC EXERCISES: CPT | Mod: GP | Performed by: PHYSICAL THERAPIST

## 2020-09-02 ENCOUNTER — ALLIED HEALTH/NURSE VISIT (OUTPATIENT)
Dept: NURSING | Facility: CLINIC | Age: 50
End: 2020-09-02
Payer: COMMERCIAL

## 2020-09-02 DIAGNOSIS — Z23 NEED FOR PROPHYLACTIC VACCINATION AND INOCULATION AGAINST INFLUENZA: Primary | ICD-10-CM

## 2020-09-02 PROCEDURE — 90682 RIV4 VACC RECOMBINANT DNA IM: CPT

## 2020-09-02 PROCEDURE — 90471 IMMUNIZATION ADMIN: CPT

## 2020-09-03 ENCOUNTER — HOSPITAL ENCOUNTER (OUTPATIENT)
Dept: CT IMAGING | Facility: CLINIC | Age: 50
End: 2020-09-03
Attending: STUDENT IN AN ORGANIZED HEALTH CARE EDUCATION/TRAINING PROGRAM
Payer: COMMERCIAL

## 2020-09-03 ENCOUNTER — HOSPITAL ENCOUNTER (OUTPATIENT)
Dept: CT IMAGING | Facility: CLINIC | Age: 50
End: 2020-09-03
Attending: INTERNAL MEDICINE
Payer: COMMERCIAL

## 2020-09-03 DIAGNOSIS — Z82.49 FAMILY HISTORY OF PREMATURE CORONARY ARTERY DISEASE: ICD-10-CM

## 2020-09-03 DIAGNOSIS — Z82.49 FAMILY HISTORY OF CORONARY ARTERY DISEASE: ICD-10-CM

## 2020-09-03 PROCEDURE — 75571 CT HRT W/O DYE W/CA TEST: CPT | Performed by: INTERNAL MEDICINE

## 2020-09-03 PROCEDURE — 75571 CT HRT W/O DYE W/CA TEST: CPT | Mod: 76

## 2020-09-03 PROCEDURE — 75571 CT HRT W/O DYE W/CA TEST: CPT

## 2020-09-22 ENCOUNTER — THERAPY VISIT (OUTPATIENT)
Dept: PHYSICAL THERAPY | Facility: CLINIC | Age: 50
End: 2020-09-22
Payer: COMMERCIAL

## 2020-09-22 DIAGNOSIS — M54.50 ACUTE LOW BACK PAIN, UNSPECIFIED BACK PAIN LATERALITY, UNSPECIFIED WHETHER SCIATICA PRESENT: ICD-10-CM

## 2020-09-22 DIAGNOSIS — M54.2 NECK PAIN: ICD-10-CM

## 2020-09-22 DIAGNOSIS — M25.552 HIP PAIN, LEFT: ICD-10-CM

## 2020-09-22 PROCEDURE — 97530 THERAPEUTIC ACTIVITIES: CPT | Mod: GP | Performed by: PHYSICAL THERAPIST

## 2020-09-22 PROCEDURE — 97110 THERAPEUTIC EXERCISES: CPT | Mod: GP | Performed by: PHYSICAL THERAPIST

## 2020-10-13 ENCOUNTER — THERAPY VISIT (OUTPATIENT)
Dept: PHYSICAL THERAPY | Facility: CLINIC | Age: 50
End: 2020-10-13
Payer: COMMERCIAL

## 2020-10-13 DIAGNOSIS — M54.50 ACUTE LOW BACK PAIN, UNSPECIFIED BACK PAIN LATERALITY, UNSPECIFIED WHETHER SCIATICA PRESENT: ICD-10-CM

## 2020-10-13 DIAGNOSIS — M54.2 NECK PAIN: ICD-10-CM

## 2020-10-13 DIAGNOSIS — M25.552 HIP PAIN, LEFT: ICD-10-CM

## 2020-10-13 PROCEDURE — 97530 THERAPEUTIC ACTIVITIES: CPT | Mod: GP | Performed by: PHYSICAL THERAPIST

## 2020-10-13 PROCEDURE — 97110 THERAPEUTIC EXERCISES: CPT | Mod: GP | Performed by: PHYSICAL THERAPIST

## 2020-10-19 ENCOUNTER — APPOINTMENT (OUTPATIENT)
Dept: GENERAL RADIOLOGY | Facility: CLINIC | Age: 50
End: 2020-10-19
Attending: EMERGENCY MEDICINE
Payer: COMMERCIAL

## 2020-10-19 ENCOUNTER — HOSPITAL ENCOUNTER (EMERGENCY)
Facility: CLINIC | Age: 50
Discharge: HOME | End: 2020-10-19
Attending: EMERGENCY MEDICINE | Admitting: EMERGENCY MEDICINE
Payer: COMMERCIAL

## 2020-10-19 VITALS
DIASTOLIC BLOOD PRESSURE: 89 MMHG | HEART RATE: 68 BPM | SYSTOLIC BLOOD PRESSURE: 143 MMHG | TEMPERATURE: 97.3 F | RESPIRATION RATE: 20 BRPM | OXYGEN SATURATION: 100 %

## 2020-10-19 DIAGNOSIS — S89.91XA KNEE INJURY, RIGHT, INITIAL ENCOUNTER: ICD-10-CM

## 2020-10-19 DIAGNOSIS — S62.632A DISPLACED FRACTURE OF DISTAL PHALANX OF RIGHT MIDDLE FINGER, INITIAL ENCOUNTER FOR CLOSED FRACTURE: ICD-10-CM
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
## 2020-10-19 PROCEDURE — 250N000013 HC RX MED GY IP 250 OP 250 PS 637: Performed by: EMERGENCY MEDICINE

## 2020-10-19 PROCEDURE — 29130 APPL FINGER SPLINT STATIC: CPT | Mod: F7

## 2020-10-19 PROCEDURE — 73130 X-RAY EXAM OF HAND: CPT | Mod: RT

## 2020-10-19 PROCEDURE — 73562 X-RAY EXAM OF KNEE 3: CPT | Mod: RT

## 2020-10-19 PROCEDURE — 99285 EMERGENCY DEPT VISIT HI MDM: CPT

## 2020-10-19 RX ORDER — IBUPROFEN 600 MG/1
600 TABLET, FILM COATED ORAL ONCE
Status: COMPLETED | OUTPATIENT
Start: 2020-10-19 | End: 2020-10-19

## 2020-10-19 RX ORDER — ACETAMINOPHEN 500 MG
1000 TABLET ORAL ONCE
Status: COMPLETED | OUTPATIENT
Start: 2020-10-19 | End: 2020-10-19

## 2020-10-19 RX ADMIN — IBUPROFEN 600 MG: 600 TABLET, FILM COATED ORAL at 09:06

## 2020-10-19 RX ADMIN — ACETAMINOPHEN 1000 MG: 500 TABLET, FILM COATED ORAL at 09:06

## 2020-10-19 ASSESSMENT — ENCOUNTER SYMPTOMS: ARTHRALGIAS: 1

## 2020-10-19 NOTE — DISCHARGE INSTRUCTIONS
Keep splint on R middle finger    Use cane to help offload weight from R knee    Use Tylenol and/or Ibuprofen for pain or discomfort

## 2020-10-19 NOTE — ED PROVIDER NOTES
"History     Chief Complaint:  Fall, Hand Injury, and Knee Injury       The history is provided by the patient.     Veronica Messina is a 50 year old right-handed female with a history of obesity and migraine who presents for evaluation of right knee, right foot, and right finger pain. The patient reports that she was walking out to her car to take her daughter to school this morning. There are two steps to get into her garage. As she was walking down the steps this morning, she reports that she \"thought she was on the first step and missed the second step\", falling onto her right side landing on her right knee which has since begun to swell and become increasingly painful. Her knee pain is exacerbated with ambulation, though she has been able to stand since the incident. She states that she also bent her right middle finger backwards. Her purse buffered her left side when she fell and she denies any new left-sided pain from this incident. She denies hitting her head. Here, she denies ankle pain or further pain.     Of note, she mentions more chronic issues of her left meniscus and left metatarsal.       Allergies:  No Known Drug Allergies    Medications:   The patient is not currently taking any prescribed medications.     Medical History:   Intractable chronic migraine without aura  Obesity  Mixed stress and urge urinary incontinence    Surgical History   Bunionectomy - bilateral  Repair of hammertoe  Hysteroscopy  Tonsillectomy     Family History:   Mother -  Hypertension, diabetes  Father -  Prostate problems  Brother(s) -  Arrhythmia, obesity     Social History:  The patient was accompanied to the ED by her .  Smoking Status: Never   Smokeless Tobacco: Never   Alcohol Use: No  Drug Use: No   Marital Status:   PCP: Bill Dangelo        Review of Systems   Musculoskeletal: Positive for arthralgias (right knee, right foot, right middle finger).   All other systems reviewed and are " negative.        Physical Exam     Patient Vitals for the past 24 hrs:   BP Temp Resp   10/19/20 0836 (!) 133/112 97.3  F (36.3  C) 20          Physical Exam    HEENT: AT/NC mmm  Neck: supple, painless ROM  CV: ppi, regular   Resp: speaking in full sentences without any resp distress  Abd: abdomen is soft without significant tenderness, masses, organomegaly or guarding  Ext:  Skeletal survey unremarkable except below:    R long finger distal phalanx tender to palpation with moderate STS, distal perfusion and sensation intact, fds/fdp intact, able to extend at dip.        R knee + ttp prox and lateral tibia no large joint effusion, ext mech intact.  Distal cms intact   Skin: warm dry well perfused  Neuro: Alert, no gross motor or sensory deficits        Emergency Department Course     Imaging:  Radiology results were communicated with the patient who voiced understanding of the findings.    XR Knee Right 3 views:  IMPRESSION:  Negative exam.   Reading per radiology.    XR Hand Right G/E 3 views:  IMPRESSION: There is a moderate-sized oblique intra-articular fracture   of the dorsal aspect of the base of the distal phalanx of the long   finger. The fracture fragment is about 4 x 4 x 7 mm and is displaced 2   mm. The fracture fragment involves about half of the articular   surface. Otherwise negative.  Reading per radiology.     Interventions:   0906 Ibuprofen 600 mg PO   0906 Tylenol 1000 mg PO     Emergency Department Course:    Nursing notes and vitals reviewed.    0841 I performed an exam of the patient as documented above.     0850 The patient was sent for XR while in the emergency department, results above.     0927 Patient rechecked and updated.      0940 Findings and plan explained to the Patient. Patient discharged home with instructions regarding supportive care, medications, and reasons to return. The importance of close follow-up was reviewed.      Impression & Plan     Medical Decision Making:  Accidental  fall, R long finger distal phalanx intra-articular frx, ext mech intact, distal cms intact.  Splinted and f/u hand surgery.  R knee inury meniscus vs LCL.  Ext mech intact, f/u ortho WBAT.   Patient verbalized understanding and agreement with plan.  Also understands when to return to the ER.           Diagnosis:     ICD-10-CM    1. Fall, initial encounter  W19.XXXA    2. Knee injury, right, initial encounter  S89.91XA    3. Displaced fracture of distal phalanx of right middle finger, initial encounter for closed fracture  S62.632A         Disposition:  Discharged to home.    Scribe Disclosure:  Mere MELARA, am serving as a scribe at 9:12 AM on 10/19/2020 to document services personally performed by Sid Fritz MD based on my observations and the provider's statements to me.        Sid Fritz MD  10/19/20 2444

## 2020-10-19 NOTE — ED AVS SNAPSHOT
[facility]  [address]  [facility] [address]  Phone: [phone]  Fax: [phone]                                    Veronica Messina   MRN: [id number]    Department: [facility]   Date of Visit: 10/19/2020           After Visit Summary Signature Page    I have received my discharge instructions, and my questions have been answered. I have discussed any challenges I see with this plan with the nurse or doctor.    ..........................................................................................................................................  Patient/Patient Representative Signature      ..........................................................................................................................................  Patient Representative Print Name and Relationship to Patient    ..................................................               ................................................  Date                                   Time    ..........................................................................................................................................  Reviewed by Signature/Title    ...................................................              ..............................................  Date                                               Time          22EPIC Rev 08/18

## 2020-10-19 NOTE — ED TRIAGE NOTES
Arrives with injuries to right middle finger and right knee after a fall this morning, denies hitting head, arrives ambulatory. Alert and oriented, ABCs intact.

## 2020-10-21 ENCOUNTER — TELEPHONE (OUTPATIENT)
Dept: PEDIATRICS | Facility: CLINIC | Age: 50
End: 2020-10-21

## 2020-10-21 ENCOUNTER — OFFICE VISIT (OUTPATIENT)
Dept: PEDIATRICS | Facility: CLINIC | Age: 50
End: 2020-10-21
Payer: COMMERCIAL

## 2020-10-21 VITALS
SYSTOLIC BLOOD PRESSURE: 122 MMHG | WEIGHT: 250 LBS | BODY MASS INDEX: 40.18 KG/M2 | RESPIRATION RATE: 20 BRPM | HEIGHT: 66 IN | HEART RATE: 78 BPM | DIASTOLIC BLOOD PRESSURE: 80 MMHG | TEMPERATURE: 97.8 F

## 2020-10-21 DIAGNOSIS — E66.01 MORBID OBESITY (H): ICD-10-CM

## 2020-10-21 DIAGNOSIS — Z01.818 PREOP GENERAL PHYSICAL EXAM: Primary | ICD-10-CM

## 2020-10-21 DIAGNOSIS — S62.632A CLOSED DISPLACED FRACTURE OF DISTAL PHALANX OF RIGHT MIDDLE FINGER, INITIAL ENCOUNTER: ICD-10-CM

## 2020-10-21 PROCEDURE — 99214 OFFICE O/P EST MOD 30 MIN: CPT | Mod: GC | Performed by: STUDENT IN AN ORGANIZED HEALTH CARE EDUCATION/TRAINING PROGRAM

## 2020-10-21 ASSESSMENT — MIFFLIN-ST. JEOR: SCORE: 1775.74

## 2020-10-21 NOTE — TELEPHONE ENCOUNTER
General Call:     Who is calling:  Jemma ([facility])    Reason for Call:  Needs pre-op physical faxed    What are your questions or concerns:  Patient is scheduled for early morning procedure 10/22/20.  Jemma is requesting pre-op physical be faxed ASAP to [phone] Attn: Jemma    Date of last appointment with provider: 10/21/20     Okay to leave a detailed message:Yes at Other phone number:  [phone]

## 2020-10-21 NOTE — PATIENT INSTRUCTIONS

## 2020-10-21 NOTE — PROGRESS NOTES
[facility] [place]  [address]  [address]  [place] [address] [phone]  Dept: [phone]    PRE-OP EVALUATION:  Today's date: 10/21/2020    Veronica Messina (: 1970) presents for pre-operative evaluation assessment as requested by Dr. Ruthann Parra. She requires evaluation and anesthesia risk assessment prior to undergoing surgery/procedure for treatment of Rt middle finger.    Fax number for surgical facility: [phone]  Primary Physician: [facility]  Type of Anesthesia Anticipated: General and Local    Preop Questionnnaire:  Pre-op Questionnaire 10/21/2020   1. Have you ever had a heart attack or stroke? No   2. Have you ever had surgery on your heart or blood vessels, such as a stent placement, a coronary artery bypass, or surgery on an artery in your head, neck, heart, or legs? No   3. Do you have chest pain with activity? No   4. Do you have a history of  heart failure? No   5. Do you currently have a cold, bronchitis or symptoms of other infection? No   6. Do you have a cough, shortness of breath, or wheezing? No   7. Do you or anyone in your family have previous history of blood clots? No   8. Do you or does anyone in your family have a serious bleeding problem such as prolonged bleeding following surgeries or cuts? No   9. Have you ever had problems with anemia or been told to take iron pills? No   10. Have you had any abnormal blood loss such as black, tarry or bloody stools, or abnormal vaginal bleeding? No   11. Have you ever had a blood transfusion? No   12. Are you willing to have a blood transfusion if it is medically needed before, during, or after your surgery? Yes   13. Have you or any of your relatives ever had problems with anesthesia? No   14. Do you have sleep apnea, excessive snoring or daytime drowsiness? No   15. Do you have any artifical heart valves or other implanted medical devices like a pacemaker, defibrillator, or continuous  glucose monitor? No   16. Do you have artificial joints? No   17. Are you allergic to latex? No   18. Is there any chance that you may be pregnant? No     HPI:     HPI related to upcoming procedure:   Had fall on Monday while leaving home - fell down a few stairs in her garage when she was taking her daughter to school in the morning. Fell on her right knee, elbow, and hand. Did not hit her head. Went to ER at [facility] where a knee x-ray was normal and right hand x-ray showed fracture of right long finger distal phalanx. Put in splint and followed up with hand surgery, who advised pinning which is scheduled for tomorrow.     COVID-19 test done yesterday - results pending.    See problem list for active medical problems.  Problems all longstanding and stable, except as noted/documented.  See ROS for pertinent symptoms related to these conditions.    MEDICAL HISTORY:     Patient Active Problem List    Diagnosis Date Noted     Morbid obesity (H) 10/28/2020     Priority: Medium     Acute low back pain 07/29/2020     Priority: Medium     Neck pain 07/29/2020     Priority: Medium     Hip pain, left 07/29/2020     Priority: Medium     Mixed stress and urge urinary incontinence 05/17/2019     Priority: Medium     Intractable chronic migraine without aura 05/17/2019     Priority: Medium      Past Medical History:   Diagnosis Date     Infertility, female      Migraine      Past Surgical History:   Procedure Laterality Date     BUNIONECTOMY Left      BUNIONECTOMY Right      HC REPAIR OF HAMMERTOE,ONE Right      HYSTEROSCOPY       TONSILLECTOMY  1977     No current outpatient medications on file.     OTC products: NSAIDS (Aleve) - will hold off on more Aleve today before surgery    No Known Allergies   Latex Allergy: NO    Social History     Tobacco Use     Smoking status: Never Smoker     Smokeless tobacco: Never Used   Substance Use Topics     Alcohol use: No     Comment: occ     History   Drug Use No     REVIEW OF SYSTEMS:  "  Constitutional, neuro, ENT, endocrine, pulmonary, cardiac, gastrointestinal, genitourinary, musculoskeletal, integument and psychiatric systems are negative, except as otherwise noted.    EXAM:   /80   Pulse 78   Temp 97.8  F (36.6  C) (Oral)   Resp 20   Ht 1.676 m (5' 6\")   Wt 113.4 kg (250 lb)   LMP 03/15/2019 (Approximate)   BMI 40.35 kg/m    GENERAL APPEARANCE: healthy, alert and no distress  RESP: lungs clear to auscultation - no rales, rhonchi or wheezes  CV: regular rate and rhythm, normal S1 S2, no S3 or S4 and no murmur, click or rub   ABDOMEN: soft, nontender, no HSM or masses and bowel sounds normal  NEURO: normal strength and tone, sensory exam grossly normal, mentation intact and speech normal  MSK: right middle finger in splint, minimal appreciable swelling or discoloration    DIAGNOSTICS:   EKG: Not indicated due to non-vascular surgery and low risk of event (age <65 and without cardiac risk factors)    CT CALCIUM SCREENING   Examination Date: 9/3/2020 1:32 PM   CORONARY ARTERY CALCIUM SCORES:   Left main coronary artery: 0  Left anterior descending coronary artery: 0  Circumflex coronary artery: 0   Right coronary artery: 0  TOTAL CALCIUM SCORE: 0  The total Agatston calcium score is 0.     Recent Labs   Lab Test 07/16/20  1225 05/03/18  1036 07/21/17  1019   HGB  --  11.4* 11.5*    141 140   POTASSIUM 4.2 4.1 4.2   CR 0.71 0.63 0.73     IMPRESSION:   Reason for surgery/procedure: Displaced fracture of distal phalanx of right middle finger  Diagnosis/reason for consult: Pre-op physical exam    The proposed surgical procedure is considered INTERMEDIATE risk.    REVISED CARDIAC RISK INDEX  The patient has the following serious cardiovascular risks for perioperative complications such as (MI, PE, VFib and 3  AV Block):  No serious cardiac risks  INTERPRETATION: 0 risks: Class I (very low risk - 0.4% complication rate)    The patient has the following additional risks for " perioperative complications:  Obesity      ICD-10-CM    1. Preop general physical exam  Z01.818    2. Closed displaced fracture of distal phalanx of right middle finger, initial encounter  S62.954V    3. BMI > 40.0 (H)  E66.01      RECOMMENDATIONS:     -- Patient is on no chronic medications    APPROVAL GIVEN to proceed with proposed procedure, without further diagnostic evaluation       Signed Electronically by: Mary Mena MD    Copy of this evaluation report is provided to requesting physician.    [facility] Preop Guidelines    Revised Cardiac Risk Index  Answers for HPI/ROS submitted by the patient on 10/21/2020   Chronic problems general questions HPI Form  How many servings of fruits and vegetables do you eat daily?: 2-3  On average, how many sweetened beverages do you drink each day (Examples: soda, juice, sweet tea, etc.  Do NOT count diet or artificially sweetened beverages)?: 1  How many minutes a day do you exercise enough to make your heart beat faster?: 10 to 19  How many days a week do you exercise enough to make your heart beat faster?: 4  How many days per week do you miss taking your medication?: 0  --------    I personally saw this patient and discussed this case in depth with Dr. Mena. I reviewed and agree with the key components of the history, physical, assessment, and plan.           NANCY Gill MD  Internal Medicine-Pediatrics

## 2020-10-28 PROBLEM — E66.09 NON MORBID OBESITY DUE TO EXCESS CALORIES: Status: RESOLVED | Noted: 2017-07-22 | Resolved: 2020-10-28

## 2020-10-28 PROBLEM — E66.01 MORBID OBESITY (H): Status: ACTIVE | Noted: 2020-10-28

## 2021-09-05 ENCOUNTER — HEALTH MAINTENANCE LETTER (OUTPATIENT)
Age: 51
End: 2021-09-05

## 2021-10-31 ENCOUNTER — HEALTH MAINTENANCE LETTER (OUTPATIENT)
Age: 51
End: 2021-10-31

## 2021-11-09 PROBLEM — M54.50 ACUTE LOW BACK PAIN: Status: RESOLVED | Noted: 2020-07-29 | Resolved: 2021-11-09

## 2021-11-09 PROBLEM — M54.2 NECK PAIN: Status: RESOLVED | Noted: 2020-07-29 | Resolved: 2021-11-09

## 2021-11-09 PROBLEM — M25.552 HIP PAIN, LEFT: Status: RESOLVED | Noted: 2020-07-29 | Resolved: 2021-11-09

## 2022-05-11 ENCOUNTER — OFFICE VISIT (OUTPATIENT)
Dept: PODIATRY | Facility: CLINIC | Age: 52
End: 2022-05-11
Payer: COMMERCIAL

## 2022-05-11 VITALS — BODY MASS INDEX: 39.54 KG/M2 | DIASTOLIC BLOOD PRESSURE: 90 MMHG | WEIGHT: 245 LBS | SYSTOLIC BLOOD PRESSURE: 128 MMHG

## 2022-05-11 DIAGNOSIS — M20.42 HAMMERTOE OF LEFT FOOT: ICD-10-CM

## 2022-05-11 DIAGNOSIS — M79.672 LEFT FOOT PAIN: Primary | ICD-10-CM

## 2022-05-11 DIAGNOSIS — L91.0 KELOID SCAR: ICD-10-CM

## 2022-05-11 PROCEDURE — 99214 OFFICE O/P EST MOD 30 MIN: CPT | Performed by: PODIATRIST

## 2022-05-11 NOTE — PROGRESS NOTES
PATIENT HISTORY:   Veronica Messina is a 52 year old female who presents to clinic for painful callus on the left second toe.  She notes she has had for over a year.  It rubs in her shoes and causes pain.  Pain can be 4 out of 10.  She has been soaking it in Epson salts.  Sore with increased activity.  She has had bunion surgery on both feet previously.  She is wondering what can be done to get rid of the callus.  Denies specific injury.    Review of Systems:  Patient denies fever, chills, rash, wound, stiffness, limping, numbness, weakness, heart burn, blood in stool, chest pain with activity, calf pain when walking, shortness of breath with activity, chronic cough, easy bleeding/bruising, swelling of ankles, excessive thirst, fatigue, depression, anxiety.       PAST MEDICAL HISTORY:   Past Medical History:   Diagnosis Date     Infertility, female      Migraine         PAST SURGICAL HISTORY:   Past Surgical History:   Procedure Laterality Date     BUNIONECTOMY Left      BUNIONECTOMY Right      HC REPAIR OF HAMMERTOE,ONE Right      HYSTEROSCOPY       TONSILLECTOMY  1977        MEDICATIONS: No current outpatient medications on file.     ALLERGIES:  No Known Allergies     SOCIAL HISTORY:   Social History     Socioeconomic History     Marital status:      Spouse name: Not on file     Number of children: 1     Years of education: Not on file     Highest education level: Not on file   Occupational History     Not on file   Tobacco Use     Smoking status: Never Smoker     Smokeless tobacco: Never Used   Substance and Sexual Activity     Alcohol use: No     Comment: occ     Drug use: No     Sexual activity: Yes     Partners: Male   Other Topics Concern     Parent/sibling w/ CABG, MI or angioplasty before 65F 55M? Not Asked   Social History Narrative     Not on file     Social Determinants of Health     Financial Resource Strain: Not on file   Food Insecurity: Not on file   Transportation Needs: Not on file    Physical Activity: Not on file   Stress: Not on file   Social Connections: Not on file   Intimate Partner Violence: Not on file   Housing Stability: Not on file        FAMILY HISTORY:   Family History   Problem Relation Age of Onset     Hypertension Mother      Diabetes Mother      Prostate Problems Father      Arrhythmia Brother         WPW     Obesity Brother      Thyroid Disease Maternal Grandmother      Dementia Maternal Grandmother      Diabetes Maternal Grandfather      Kidney Disease Maternal Grandfather      Heart Disease Maternal Grandfather      Heart Disease Paternal Grandmother      Cancer Paternal Grandfather         throat     Lung Cancer Paternal Grandfather         EXAM:Vitals: BP (!) 128/90   Wt 111.1 kg (245 lb)   LMP 03/15/2019 (Approximate)   BMI 39.54 kg/m    BMI= Body mass index is 39.54 kg/m .    General appearance: Patient is alert and fully cooperative with history & exam.  No sign of distress is noted during the visit.     Psychiatric: Affect is pleasant & appropriate.  Patient appears motivated to improve health.     Respiratory: Breathing is regular & unlabored while sitting.     HEENT: Hearing is intact to spoken word.  Speech is clear.  No gross evidence of visual impairment that would impact ambulation.     Dermatologic: No open lesions are noted but there is tenting of the skin at the proximal inner phalangeal joint of the left toe.     Vascular: DP & PT pulses are intact & regular bilaterally.  No significant edema or varicosities noted.  CFT and skin temperature is normal to both lower extremities.     Neurologic: Lower extremity sensation is intact to light touch.  No evidence of weakness or contracture in the lower extremities.  No evidence of neuropathy.     Musculoskeletal: Patient is ambulatory without assistive device or brace.  Semirigid contracture of the left second toe.    Radiographs: Left foot x-ray - I personally reviewed the xrays -decreased calcaneal inclination  angle.  Degenerative changes to the midfoot.  Previous bunion surgery on the first metatarsal.  Slightly elongated second metatarsal with contracture at the proximal inner phalangeal joint.  No fractures are noted.     ASSESSMENT:    Left foot pain  Hammertoe of left foot  Keloid scar     Medical Decision Making/Plan:  Reviewed patient's chart in [facility].  Reviewed and discussed causes of hammertoes with patient.  Explained that this can be caused by an overpowering of muscles or by the way we walk.  Discussed conservative treatments such as orthotics, pads, shoe gear.  Explained that sometimes the flexor tendons can be cut to try and straighten the toe and reduce rubbing. This is normally done in office and patient is weight bearing in postop she for 1-2 weeks.  We also discussed surgical intervention to remove the joint and possibly fuse the toe.  Normally patient has a pin sticking out of the toe for about 6 weeks and can not get the foot wet. Patient would have to be minimal weight bearing in cam boot.      Talked about risks including infection, numbness, continued pain, non union , recurrence, need for further surgery, blood loss, blood clotting. You will scar.    She would like to proceed with surgery.  She does note that she develops keloid scars and her last surgeon stitched her incision very close together to try to prevent that.  She is hoping that will occur this time.  All questions were answered to patient satisfaction and she will call for the questions or concerns.    Patient risk factor: Patient is at low risk for infection.        Radha Zeng DPM, Podiatry/Foot and Ankle Surgery

## 2022-05-11 NOTE — LETTER
5/11/2022         RE: Veronica Messina  [address] [place] [address]        Dear Colleague,    Thank you for referring your patient, Veronica Messina, to the [facility]. Please see a copy of my visit note below.    PATIENT HISTORY:   Veronica Messina is a 52 year old female who presents to clinic for painful callus on the left second toe.  She notes she has had for over a year.  It rubs in her shoes and causes pain.  Pain can be 4 out of 10.  She has been soaking it in Epson salts.  Sore with increased activity.  She has had bunion surgery on both feet previously.  She is wondering what can be done to get rid of the callus.  Denies specific injury.    Review of Systems:  Patient denies fever, chills, rash, wound, stiffness, limping, numbness, weakness, heart burn, blood in stool, chest pain with activity, calf pain when walking, shortness of breath with activity, chronic cough, easy bleeding/bruising, swelling of ankles, excessive thirst, fatigue, depression, anxiety.       PAST MEDICAL HISTORY:   Past Medical History:   Diagnosis Date     Infertility, female      Migraine         PAST SURGICAL HISTORY:   Past Surgical History:   Procedure Laterality Date     BUNIONECTOMY Left      BUNIONECTOMY Right      HC REPAIR OF HAMMERTOE,ONE Right      HYSTEROSCOPY       TONSILLECTOMY  1977        MEDICATIONS: No current outpatient medications on file.     ALLERGIES:  No Known Allergies     SOCIAL HISTORY:   Social History     Socioeconomic History     Marital status:      Spouse name: Not on file     Number of children: 1     Years of education: Not on file     Highest education level: Not on file   Occupational History     Not on file   Tobacco Use     Smoking status: Never Smoker     Smokeless tobacco: Never Used   Substance and Sexual Activity     Alcohol use: No     Comment: occ     Drug use: No     Sexual activity: Yes     Partners: Male   Other Topics Concern      Parent/sibling w/ CABG, MI or angioplasty before 65F 55M? Not Asked   Social History Narrative     Not on file     Social Determinants of Health     Financial Resource Strain: Not on file   Food Insecurity: Not on file   Transportation Needs: Not on file   Physical Activity: Not on file   Stress: Not on file   Social Connections: Not on file   Intimate Partner Violence: Not on file   Housing Stability: Not on file        FAMILY HISTORY:   Family History   Problem Relation Age of Onset     Hypertension Mother      Diabetes Mother      Prostate Problems Father      Arrhythmia Brother         WPW     Obesity Brother      Thyroid Disease Maternal Grandmother      Dementia Maternal Grandmother      Diabetes Maternal Grandfather      Kidney Disease Maternal Grandfather      Heart Disease Maternal Grandfather      Heart Disease Paternal Grandmother      Cancer Paternal Grandfather         throat     Lung Cancer Paternal Grandfather         EXAM:Vitals: BP (!) 128/90   Wt 111.1 kg (245 lb)   LMP 03/15/2019 (Approximate)   BMI 39.54 kg/m    BMI= Body mass index is 39.54 kg/m .    General appearance: Patient is alert and fully cooperative with history & exam.  No sign of distress is noted during the visit.     Psychiatric: Affect is pleasant & appropriate.  Patient appears motivated to improve health.     Respiratory: Breathing is regular & unlabored while sitting.     HEENT: Hearing is intact to spoken word.  Speech is clear.  No gross evidence of visual impairment that would impact ambulation.     Dermatologic: No open lesions are noted but there is tenting of the skin at the proximal inner phalangeal joint of the left toe.     Vascular: DP & PT pulses are intact & regular bilaterally.  No significant edema or varicosities noted.  CFT and skin temperature is normal to both lower extremities.     Neurologic: Lower extremity sensation is intact to light touch.  No evidence of weakness or contracture in the lower  extremities.  No evidence of neuropathy.     Musculoskeletal: Patient is ambulatory without assistive device or brace.  Semirigid contracture of the left second toe.    Radiographs: Left foot x-ray - I personally reviewed the xrays -decreased calcaneal inclination angle.  Degenerative changes to the midfoot.  Previous bunion surgery on the first metatarsal.  Slightly elongated second metatarsal with contracture at the proximal inner phalangeal joint.  No fractures are noted.     ASSESSMENT:    Left foot pain  Hammertoe of left foot  Keloid scar     Medical Decision Making/Plan:  Reviewed [facility] in [facility].  Reviewed and discussed causes of hammertoes with patient.  Explained that this can be caused by an overpowering of muscles or by the way we walk.  Discussed conservative treatments such as orthotics, pads, shoe gear.  Explained that sometimes the flexor tendons can be cut to try and straighten the toe and reduce rubbing. This is normally done in office and patient is weight bearing in postop she for 1-2 weeks.  We also discussed surgical intervention to remove the joint and possibly fuse the toe.  Normally patient has a pin sticking out of the toe for about 6 weeks and can not get the foot wet. Patient would have to be minimal weight bearing in cam boot.      Talked about risks including infection, numbness, continued pain, non union , recurrence, need for further surgery, blood loss, blood clotting. You will scar.    She would like to proceed with surgery.  She does note that she develops keloid scars and her last surgeon stitched her incision very close together to try to prevent that.  She is hoping that will occur this time.  All questions were answered to patient satisfaction and she will call for the questions or concerns.    Patient risk factor: Patient is at low risk for infection.        Radha Zeng DPM, Podiatry/Foot and Ankle Surgery        Again, thank you for allowing me to participate in  the care of your patient.        Sincerely,        Radha Zeng DPM, Podiatry/Foot and Ankle Surgery

## 2022-05-11 NOTE — PATIENT INSTRUCTIONS
Thank you for choosing [facility] Podiatry / Foot & Ankle Surgery!    DR HAYDEN'S CLINIC:  [facility]   [address]   [place] [address]      TRIAGE LINE: [phone]  APPOINTMENTS: [phone]  RADIOLOGY: [phone]  SET UP SURGERY: [phone]  FAX NUMBER: [phone]  BILLING QUESTIONS: [phone]       Follow up: as needed.       Foot and Ankle Hammertoe Post Operative Instructions   Activities:  First day of surgery you need to rest.  Stay off your feet as much as possible and keep your foot elevated above the level of your heart (about 2 pillow height).  Elevate foot 23 of 24 hours a day.  Wear your surgical shoe at all times when up.  Limit walking to 5 to 10 minutes on your heel per hour over the next few days if your doctor has previously told you that you can put some weight on the foot after surgery.  If you are suppose to be non weight bearing, which means NO WEIGHT AT ALL ON THE FOOT.  Use an ice pack on the ankle 20-30 minutes per hour to help decrease pain and swelling.  The first two weeks you need to ice and elevate as much as possible.  This will help with post op pain.  Your foot requires significant rest and elevation. Expect muscle aches, back pain, cramps, etc. Optimal posture, lumbar support, back exercises, ice and heat may all help with your new aches and pains. Do not apply a heating pad to your foot or leg as this can cause increase swelling and pain. Rather use ice in those areas.   Showering is a major challenge. Your incision requires about three days to become sealed from water. Your bandage should not get wet and should not be removed. Do not attempt showering for the first three days. A sponge bath is preferred. You may attempt to shower on the fourth day after the operation. Your foot should be covered with a bag, tape and rubber bands. Double bagging is preferred. Standing in the shower with a bag on your foot is quite hazardous. A  portable shower stool would be ideal. The bandage will need to be changed in the office if it becomes moistened. A moist bandage will not dry on its own. A moist dressing may lead to infection.      External pins need continued protection. These pins are strong but do not resist the forces of bumping. Pay attention to the position and direction of the pins. Any change in pin alignment or positioning should prompt a call. A loose pin will need to be removed. Never push a pin back into your foot.    .   Do not wear regular shoes with a surgical bandage and/or external pins in your foot.  Wear loose fitting clothing that easily will slip over the bandage and/or pins.  Also, remember that dogs are not aware of your surgery.  Please keep them away from the bandages and pins.   If your surgeon places external pins in your foot, you must keep the foot dry until the pins are removed at 6-8 weeks.  Pins should be covered with a dressing for protection.  Check for any spreading redness or yellow drainage from the pins.  Do not apply ointment around the pins.  Do not push a loose pin back into the foot.  Please call the clinic if the pin is spinning or moving in and out.  If the pins are bumped or loosened, they may need to be removed early.  This may affect your surgical outcome.    Best wishes on your recovery from surgery.  Please do not hesitate to call or  My Chart  with any questions or concerns.    GETTING READY FOR YOUR SURGERY  ONE-THREE WEEKS BEFORE  1. See your Family Doctor or Primary Care Doctor for a History and Physical. If you do not, we may need to change the date of your surgery.  2. Please see pre-surgical medications below to which medications need to be stopped before surgery and when.    TEN OR MORE DAYS BEFORE    1. [place] with the hospital. (For [facility])      By Phone: [phone].      By Internet: [email or web address]. Choose [facility].      If you do not register by phone or  online, we will call to help you register.    SAME DAY SURGERY PATIENTS  1. You will need a family member of friend to drive you home. If you do not have one the surgery will be cancelled or rescheduled.  2. You will need a responsible adult to stay with you that night after the surgery.       We will ask this person to listen to some instructions before you leave the hospital.  * If your child is having surgery, and you would like a tour of the hospital, please call: [phone].      DAY BEFORE SURGERY  1. DO NOT EAT OR DRINK ANYTHING AFTER MIDNIGHT THE NIGHT BEFORE YOUR SURGERY!   2. DO NOT DRINK ALCOHOL.  3. Do not take over the counter drugs.  4. Some people need to have blood tests at the hospital. If you need blood tests, we will tell you in advance.  5. Take medications as directed by your doctor. You may take these with a small amount of water.  6. Do not chew gum, chew tobacco, or suck on hard candy the day of surgery.  7. Bring your insurance cards, a list of your medicines and co-pays you might need. Leave jewelry and other valuables at home.  8. If you received papers at your doctor's office, bring these with you to the surgery.    If you have questions about these instructions, please call: [phone]  Ask to speak with a pre-admitting nurse.    PRESURGICAL MEDICATIONS:  Certain prescription, over-the-counter, and herbal medications interfere with healing after an operation. The main concern relates to medications that increase bleeding at the surgical site. Excess blood under the incision results in poor wound healing, excess pain, increased scarring, and a higher risk of infection.    Some medications slow the healing process of bone. Medications can also interfere with the anesthesia drugs that keep you asleep during the operation. It is important to ensure that these medications are out of your system prior to the operation. The list below details a number of medications that are of concern. Pay  special attention to how long you should avoid these medications before your operation. Please note that this list is not complete. You should ask your surgeon or pharmacist if you are uncertain about other medications. Any herbal supplement not listed should be discontinued at least one week prior to surgery.    Aspirin: Hold for one week prior to surgery and restart the day after surgery. This over the counter medication promotes bleeding.    Motrin / Ibuprofen / Aleve / Advil / NSAIDS:  Stop one week prior to surgery. These medications affect bleeding and may cause delay in bone healing. Avoid taking these medications for six weeks after bone surgeries. Other procedures may allow you to restart sooner than 6 weeks after surgery.    Coumadin / Plavix: Your primary care provider will manage Coumadin in relation to surgery. Coumadin may result in excessive bleeding and may be adjusted before and after surgery.    Enbriel: Stop two weeks prior to surgery and restart two weeks after surgery. This medication can effect soft tissue healing and increases the risk of infection.    Remicade: Stop 8-12 weeks before surgery and restart two weeks after surgery. This medication can affect soft tissue healing and increases the risk of infection.    Humira: Stop 4 weeks before surgery and restart two weeks after surgery. This medication can affect soft tissue healing and increases the risk of infection.    Methotrexate: Stop one dose prior to surgery. This medication will be restarted when the wound appears to be healing well. Please ask your surgeon about restarting this medication when you are being seen in the office for wound checks.    Kava: Stop at least one day prior to surgery and may restart one day after surgery. Kava may increase the sedative effect of anesthetics that are given during the operation. Kava can also increase bleeding at the surgical site.    Ephedra (dat jacobs): Stop at least one day prior to surgery and  may restart one day after surgery.  Ephedra may increase the risk of heart attack and stroke. This medication can also increase bleeding at the surgical site.    Valarie's Wort: Stop at least five days before surgery and may restart one day after surgery. [place] wort may diminish the effects of several drugs that are given during surgery.    Ginseng: Stop at least one week prior to surgery and may restart one day after surgery.  Ginseng lowers blood sugar and may increase bleeding at the surgical site.    Ginkgo: Stop 36 hours before surgery and may restart one day after surgery. Ginkgo may increase bleeding at the surgical site.    Garlic: Stop at least one week prior to surgery and may restart one day after. Garlic may increase bleeding at the surgery site.    Valerian: Do a slow steady decrease in your daily dose over a period of 2-3 weeks before surgery to decrease the chance of withdrawal symptoms. Valerian may increase the sedative effect of anesthetics given during the operation.    Echinacea: There is no data on stopping echinacea prior to surgery. This medication though can be associated with allergic reactions and suppression of your immune system.    Vitamin E, Omega-3, Flax, Fish Oil, Glucosamine and Chondroitin: Stop 2 weeks prior to surgery and may restart one day after. These herbal medications can increase risk of bleeding at surgical site.     POTENTIAL COMPLICATIONS OF FOOT & ANKLE SURGERY  Undergoing a surgical procedure involves a certain amount of risk. Risks of complications vary depending on the complexity of the surgery and how you take care of the surgical area during the healing process. Complications can range from minor infection to death. Some complications are temporary while others will be permanent.  Your surgeon weighs the risk of complications vs the potential benefit of undergoing surgery. You need to consider your tolerance for unexpected problems as you decide whether to  undergo surgery.    Foot and Ankle surgery involves cutting skin, bone, ligaments, blood vessels and joints.  These structures heal well but not without consequence. Any break to the skin can lead to infection. A deep infection involves bones or joints which can be devastating. Deep infection can lead to amputation or could spread to other parts of your body. Most infections are minor and easily treated with oral antibiotics. Infections are often times from bacteria already present on your skin. Proper care of the surgical site is an essential component of avoiding infection. Do not get the bandage wet and take proper care of external pins to avoid these problems.     Joint stiffness is inherent to any foot or ankle surgery. Joint surgery is a major component of reconstructive foot and ankle procedures. The ligaments and tissues around the joint are cut, and later repaired. Scare tissue limits joint mobility. This can be permanent but generally improves over the course of one year.    Surgery involves dissection around nerves. Visible nerves are moved out of the way while very small nerves are cut. Scar tissue develops around nerves and can lead to nerve pain, numbness, or neuromas. Nerve symptoms can be permanent. This can lead to numbness or sometimes hypersensitivity to touch and problems wearing shoes.    Bones do not always heal after surgery. Poor healing after a bone cut or joint fusion can lead to an extended period of casting or repeat surgery. Electronic bone stimulators are sometimes used to stimulate poor healing of bone. Nonunion is when joint fusion does not take.  This can occur as often as 10% of the time. Smoking doubles your risk of poor bone healing to 20%.    Bone grafting is sometimes necessary during the original or subsequent surgery. Bone is sometimes taken from other parts of your body or freeze dried bone from a bone bank from a bone bank or synthetic bone material might be used.    A scar  is always present after foot and ankle surgery. The scar will be visible and could be sensitive. Some people develop excessive scarring, which cannot be controlled by the surgeon. Scars can be unsightly and can restrict joint mobility.    Blood clots can develop in the calf after surgery. Foot and ankle surgery is a predisposing factor for blood clots. The blood clot could break and travel to your lung.  This condition can lead to death. Early warning signs could include calf swelling and pain, chest pain or shortness of breath. This is an emergency that requires immediate attention by a medical doctor!    Surgery will not necessarily create a pain-free foot. Even normal feet hurt. Crooked toes, bunions, neuromas, flat feet and arthritis should all be considered permanent conditions.  Ankle pain commonly requires multiple surgeries over a lifetime. Do not assume that having surgery will permanently fix your condition. You may need permanent alteration in shoes and activities to accommodate your foot and ankle problem.    Careful attention to post-operative recommendations will dramatically reduce your risk of complications. Proper dressing, wound care, elevation and rest will be essential to get the wound healed and minimize scarring. Strict attention to activity restrictions, such as non-weight bearing, or partial weight bearing is essential. Internal fixation devices may not resist the stress of walking. Some select surgeries allow the patient to walk, however this should be very minimal.    Despite these concerns, foot and ankle surgery leads to a high level of patient satisfaction. Your surgeon would not recommend surgery if he/she did not expect your foot to improve. Talk to your surgeon about any of the above issues.    POST OPERATIVE HOME CARE INSTRUCTIONS  Activities: You should rest today. Stay off your feet as much as possible and keep your foot elevated above the level of your heart (about two pillow  height). Wear your surgical shoe at all times when up. Limit walking to 5 to 10 minutes per hour over the next few days if your doctor has previously told you that you can put some weight on the foot after surgery, although limit the weight to your heel. If you are supposed to be non-weight bearing, that means NO WEIGHT AT ALL ON THE FOOT. Use an ice pack on the ankle while awake 20 minutes per hour to help decrease pain and swelling.     Discomfort: If a prescription for pain was given, take as directed. If no pain medication was ordered, you may take a non-prescription, non-aspirin pain medication. If the pain is not relieved by pain medication, call the clinic.     Incision and Dressing: Your surgical dressing is a sterile dressing and should be left in place until removed by your physician. Keep the dressing dry by covering it with a plastic bag for showers, taking baths with the surgical foot out of the tub, or sponge bathing. Some bleeding on the dressing should be expected. If however, you notice active or excessive bleeding or a temperature over 100 degrees by mouth, call the clinic. Do not change dressing by yourself.  If the dressing becomes wet or dirty, please call the clinic as it may need a new sterile dressing applied. You may start getting the foot wet after the stitches are removed.     Do not wear regular shoes with a surgical bandage and/or external pins in your foot. Wear loose fitting clothing that easily will slip over the bandage and/or pins. Do not cover your surgical foot with blankets as they may damage the dressing/pins. Also, remember that dogs are not aware of your surgery. Please keep them away from the bandage/pins.   If your surgeon places external pins in your foot, you must keep the foot dry until the pins are removed at 6-8 weeks after the surgery. Pins should be covered with a dressing for protection. You should examine the pins and your skin often. Check for any spreading redness  or yellow drainage from the pin areas. Do not apply ointment around the pins. Do not push a loose pin back into your foot. Please call the clinic if the pin is spinning or moving in and out. If the pins are bumped or loosened they may need to be removed early. This may affect your surgical outcome.   Please call the clinic if you feel there is a problem with your pins and/or surgical bandage.    TIPS FOR SUCCESSFUL HEALING  How you care for the surgical site is critically important to achieve a successful result after surgery. Avoidance of injury, infection, excess swelling, scar tissue and stiffness are highly dependent on the care you provide over the next six weeks. Please do not hesitate to call if you have questions or concerns.   Your foot requires significant rest and elevation. Sitting for long hours with your foot elevated, however, will create its own problems. Expect muscle aches, back pain, cramps, etc. Optimal posture, lumbar support, back exercises, ice and heat may all help with your new aches and pains. Do not apply a heating pad to your foot or leg as this can cause increase swelling and pain. Rather use ice in those areas.   Pain medications cause drowsiness. You may frequently sleep during the day and then have trouble sleeping at night. Over the counter sleep aids might be more effective than narcotic pain medication to achieve a reasonable night's sleep.    Narcotic pain medications and inactivity lead to constipation. Limiting use of narcotics will help minimize this problem. The pain medications will not completely alleviate your pain. The purpose of pain pills is to take the edge off and help you get through the first few days. You can substitute Extra Strength Tylenol if pain is mild. Please note that narcotic pain pills usually contain acetaminophen (the active ingredient in Tylenol) so be careful to avoid the maximum dose of acetaminophen. You should take measures to avoid constipation by  drinking plenty of water, eating lots of fruit and vegetables and taking the recommended dose of Metamucil or a similar fiber supplement. These measures should be continued for as long as you require narcotic pain medications and are inactive.     Showering is a major challenge. Your incision requires about three days to become sealed from water. Your bandage should not get wet and should not be removed. Do not attempt showering for the first three days. A sponge bath is preferred. You may attempt to shower on the fourth day after the operation. Your foot should be covered with a bag, tape and rubber bands. Double bagging is preferred. Standing in the shower with a bag on your foot is quite hazardous. A portable shower stool would be ideal. The bandage will need to be changed in the office if it becomes moistened. A moist bandage will not dry on its own. A moist dressing may lead to infection.   Stiffness will develop after any operation due to scarring. The scar tissue begins to form immediately after the surgery. Inactivity can cause excess stiffness and may lead to blood clots in your legs. Frequent range of motion exercises will help decrease stiffness, blood clots, scar tissue and adhesions. Please call if you are unsure about these recommendations.   Good luck and best wishes on a prompt recovery. Healing is slow but an important step in your recovery. You are in control of the final result. Please use this time wisely. Please do not hesitate to call if you have questions, concerns or comments.    * If you have any post-operative questions or concerns regarding your procedure, call our triage team at the [facility] at [phone] (option 3).

## 2022-05-16 ENCOUNTER — TELEPHONE (OUTPATIENT)
Dept: PODIATRY | Facility: CLINIC | Age: 52
End: 2022-05-16
Payer: COMMERCIAL

## 2022-05-16 NOTE — TELEPHONE ENCOUNTER
Spoke to patient. Patient is looking for end of June dates.  Made [employer] request.  Awaiting confirmation.     Rosa Andrade, Surgery Scheduler

## 2022-06-01 NOTE — TELEPHONE ENCOUNTER
Attempted to reach patient to notify of surgery date.     Phone rings and rings.        Rosa Andrade, Surgery Scheduler

## 2022-06-02 NOTE — TELEPHONE ENCOUNTER
Spoke to patient.  Scheduled surgery for fall.      Type of surgery: left second hammertoe repair  Location of surgery: [facility]  Date and time of surgery: 9/12/22  Surgeon: Karri  Pre-Op Appt Date: patient to schedule  Post-Op Appt Date: 9/20/22   Packet sent out: Yes  Pre-cert/Authorization completed:  No  Date: 6.2.22      Rosa Andrade, Surgery Scheduler

## 2022-08-22 SDOH — ECONOMIC STABILITY: INCOME INSECURITY: IN THE LAST 12 MONTHS, WAS THERE A TIME WHEN YOU WERE NOT ABLE TO PAY THE MORTGAGE OR RENT ON TIME?: NO

## 2022-08-22 SDOH — ECONOMIC STABILITY: INCOME INSECURITY: HOW HARD IS IT FOR YOU TO PAY FOR THE VERY BASICS LIKE FOOD, HOUSING, MEDICAL CARE, AND HEATING?: NOT HARD AT ALL

## 2022-08-22 SDOH — ECONOMIC STABILITY: TRANSPORTATION INSECURITY
IN THE PAST 12 MONTHS, HAS THE LACK OF TRANSPORTATION KEPT YOU FROM MEDICAL APPOINTMENTS OR FROM GETTING MEDICATIONS?: NO

## 2022-08-22 SDOH — ECONOMIC STABILITY: FOOD INSECURITY: WITHIN THE PAST 12 MONTHS, YOU WORRIED THAT YOUR FOOD WOULD RUN OUT BEFORE YOU GOT MONEY TO BUY MORE.: NEVER TRUE

## 2022-08-22 SDOH — HEALTH STABILITY: PHYSICAL HEALTH: ON AVERAGE, HOW MANY DAYS PER WEEK DO YOU ENGAGE IN MODERATE TO STRENUOUS EXERCISE (LIKE A BRISK WALK)?: 3 DAYS

## 2022-08-22 SDOH — ECONOMIC STABILITY: FOOD INSECURITY: WITHIN THE PAST 12 MONTHS, THE FOOD YOU BOUGHT JUST DIDN'T LAST AND YOU DIDN'T HAVE MONEY TO GET MORE.: NEVER TRUE

## 2022-08-22 SDOH — HEALTH STABILITY: PHYSICAL HEALTH: ON AVERAGE, HOW MANY MINUTES DO YOU ENGAGE IN EXERCISE AT THIS LEVEL?: 50 MIN

## 2022-08-22 SDOH — ECONOMIC STABILITY: TRANSPORTATION INSECURITY
IN THE PAST 12 MONTHS, HAS LACK OF TRANSPORTATION KEPT YOU FROM MEETINGS, WORK, OR FROM GETTING THINGS NEEDED FOR DAILY LIVING?: NO

## 2022-08-22 ASSESSMENT — SOCIAL DETERMINANTS OF HEALTH (SDOH)
DO YOU BELONG TO ANY CLUBS OR ORGANIZATIONS SUCH AS CHURCH GROUPS UNIONS, FRATERNAL OR ATHLETIC GROUPS, OR SCHOOL GROUPS?: NO
HOW OFTEN DO YOU GET TOGETHER WITH FRIENDS OR RELATIVES?: ONCE A WEEK
IN A TYPICAL WEEK, HOW MANY TIMES DO YOU TALK ON THE PHONE WITH FAMILY, FRIENDS, OR NEIGHBORS?: TWICE A WEEK
HOW OFTEN DO YOU ATTEND CHURCH OR RELIGIOUS SERVICES?: MORE THAN 4 TIMES PER YEAR

## 2022-08-22 ASSESSMENT — LIFESTYLE VARIABLES
AUDIT-C TOTAL SCORE: 1
HOW OFTEN DO YOU HAVE SIX OR MORE DRINKS ON ONE OCCASION: NEVER
SKIP TO QUESTIONS 9-10: 1
HOW OFTEN DO YOU HAVE A DRINK CONTAINING ALCOHOL: MONTHLY OR LESS
HOW MANY STANDARD DRINKS CONTAINING ALCOHOL DO YOU HAVE ON A TYPICAL DAY: 1 OR 2

## 2022-08-29 ENCOUNTER — OFFICE VISIT (OUTPATIENT)
Dept: PEDIATRICS | Facility: CLINIC | Age: 52
End: 2022-08-29
Payer: COMMERCIAL

## 2022-08-29 VITALS
OXYGEN SATURATION: 98 % | TEMPERATURE: 97.7 F | RESPIRATION RATE: 20 BRPM | WEIGHT: 235.1 LBS | HEIGHT: 67 IN | BODY MASS INDEX: 36.9 KG/M2 | HEART RATE: 62 BPM | DIASTOLIC BLOOD PRESSURE: 66 MMHG | SYSTOLIC BLOOD PRESSURE: 116 MMHG

## 2022-08-29 DIAGNOSIS — E66.01 MORBID OBESITY (H): ICD-10-CM

## 2022-08-29 DIAGNOSIS — M20.42 HAMMER TOE OF LEFT FOOT: ICD-10-CM

## 2022-08-29 DIAGNOSIS — M79.672 LEFT FOOT PAIN: ICD-10-CM

## 2022-08-29 DIAGNOSIS — Z01.818 PREOP GENERAL PHYSICAL EXAM: Primary | ICD-10-CM

## 2022-08-29 PROCEDURE — 99213 OFFICE O/P EST LOW 20 MIN: CPT | Performed by: NURSE PRACTITIONER

## 2022-08-29 NOTE — PROGRESS NOTES
[address]  [address]  [facility] [address]  Phone: [phone]  Fax: [phone]  Primary Provider: Mary Mena  Pre-op Performing Provider: SARAH WOODS      PREOPERATIVE EVALUATION:  Today's date: 8/29/2022    Veronica Messina is a 52 year old female who presents for a preoperative evaluation.    Surgical Information:  Surgery/Procedure: Hammer toe surgery L second toe  Surgery Location: [facility]  Surgeon: Dr Zeng  Surgery Date: Monday Sept 12 2022  Time of Surgery: 11:00A  Where patient plans to recover: At home with family  Fax number for surgical facility: Note does not need to be faxed, will be available electronically in Epic.    Type of Anesthesia Anticipated: to be determined    Assessment & Plan     The proposed surgical procedure is considered INTERMEDIATE risk.    Preop general physical exam  Hammer toe of left foot  Left foot pain  This is a 52 year old female who presents for preoperative exam prior to undergoing surgical intervention for hammertoe on left foot on 9/12/22 with Dr. Radha Zeng. She will be taking a COVID-19 home rapid antigen test prior to procedure.     Morbid obesity (H)  Body mass index is 37.38 kg/m .      Risks and Recommendations:  The patient has the following additional risks and recommendations for perioperative complications:   - No identified additional risk factors other than previously addressed    Medication Instructions:  Patient is on no chronic medications    RECOMMENDATION:  APPROVAL GIVEN to proceed with proposed procedure, without further diagnostic evaluation.      17 minutes spent on the date of the encounter doing chart review, patient visit and documentation         Subjective     HPI related to upcoming procedure: Hammertoe of left foot.    Preop Questions 8/22/2022   1. Have you ever had a heart attack or stroke? No   2. Have you ever had surgery on your heart or blood vessels, such  as a stent placement, a coronary artery bypass, or surgery on an artery in your head, neck, heart, or legs? No   3. Do you have chest pain with activity? No   4. Do you have a history of  heart failure? No   5. Do you currently have a cold, bronchitis or symptoms of other infection? No   6. Do you have a cough, shortness of breath, or wheezing? No   7. Do you or anyone in your family have previous history of blood clots? No   8. Do you or does anyone in your family have a serious bleeding problem such as prolonged bleeding following surgeries or cuts? No   9. Have you ever had problems with anemia or been told to take iron pills? No   10. Have you had any abnormal blood loss such as black, tarry or bloody stools, or abnormal vaginal bleeding? No   11. Have you ever had a blood transfusion? No   12. Are you willing to have a blood transfusion if it is medically needed before, during, or after your surgery? Yes   13. Have you or any of your relatives ever had problems with anesthesia? No   14. Do you have sleep apnea, excessive snoring or daytime drowsiness? No   15. Do you have any artifical heart valves or other implanted medical devices like a pacemaker, defibrillator, or continuous glucose monitor? No   16. Do you have artificial joints? No   17. Are you allergic to latex? No   18. Is there any chance that you may be pregnant? No     Preoperative Review of :   reviewed - no record of controlled substances prescribed.      Patient Active Problem List   Diagnosis     Mixed stress and urge urinary incontinence     Intractable chronic migraine without aura     Morbid obesity (H)     Past Medical History:   Diagnosis Date     Infertility, female      Migraine      Past Surgical History:   Procedure Laterality Date     BUNIONECTOMY Left      BUNIONECTOMY Right      HC REPAIR OF HAMMERTOE,ONE Right      HYSTEROSCOPY       TONSILLECTOMY  1977     Family History   Problem Relation Age of Onset     Hypertension Mother       Diabetes Mother      Prostate Problems Father      Arrhythmia Brother         WPW     Obesity Brother      Thyroid Disease Maternal Grandmother      Dementia Maternal Grandmother      Diabetes Maternal Grandfather      Kidney Disease Maternal Grandfather      Heart Disease Maternal Grandfather      Heart Disease Paternal Grandmother      Cancer Paternal Grandfather         throat     Lung Cancer Paternal Grandfather      Social History     Socioeconomic History     Marital status:      Spouse name: Not on file     Number of children: 1     Years of education: Not on file     Highest education level: Not on file   Occupational History     Not on file   Tobacco Use     Smoking status: Never Smoker     Smokeless tobacco: Never Used   Substance and Sexual Activity     Alcohol use: No     Comment: occ     Drug use: No     Sexual activity: Yes     Partners: Male   Other Topics Concern     Parent/sibling w/ CABG, MI or angioplasty before 65F 55M? Not Asked   Social History Narrative     Not on file     Social Determinants of Health     Financial Resource Strain: Low Risk      Difficulty of Paying Living Expenses: Not hard at all   Food Insecurity: No Food Insecurity     Worried About Running Out of Food in the Last Year: Never true     Ran Out of Food in the Last Year: Never true   Transportation Needs: No Transportation Needs     Lack of Transportation (Medical): No     Lack of Transportation (Non-Medical): No   Physical Activity: Sufficiently Active     Days of Exercise per Week: 3 days     Minutes of Exercise per Session: 50 min   Stress: No Stress Concern Present     Feeling of Stress : Only a little   Social Connections: Moderately Integrated     Frequency of Communication with Friends and Family: Twice a week     Frequency of Social Gatherings with Friends and Family: Once a week     Attends Alevism Services: More than 4 times per year     Active Member of Clubs or Organizations: No     Attends Club or  "Organization Meetings: Not on file     Marital Status:    Intimate Partner Violence: Not on file   Housing Stability: Low Risk      Unable to Pay for Housing in the Last Year: No     Number of Places Lived in the Last Year: 1     Unstable Housing in the Last Year: No       Review of Systems  CONSTITUTIONAL: NEGATIVE for fever, chills, change in weight  INTEGUMENTARY/SKIN: NEGATIVE for worrisome rashes, moles or lesions  EYES: NEGATIVE for vision changes or irritation  ENT/MOUTH: NEGATIVE for ear, mouth and throat problems  RESP: NEGATIVE for significant cough or SOB  CV: NEGATIVE for chest pain, palpitations or peripheral edema  GI: NEGATIVE for nausea, abdominal pain, heartburn, or change in bowel habits  : NEGATIVE for frequency, dysuria, or hematuria  MUSCULOSKELETAL: NEGATIVE for significant arthralgias or myalgia  NEURO: NEGATIVE for weakness, dizziness or paresthesias  ENDOCRINE: NEGATIVE for temperature intolerance, skin/hair changes  HEME: NEGATIVE for bleeding problems  PSYCHIATRIC: NEGATIVE for changes in mood or affect        Objective     /66 (BP Location: Right arm, Patient Position: Sitting, Cuff Size: Adult Large)   Pulse 62   Temp 97.7  F (36.5  C) (Tympanic)   Resp 20   Ht 1.689 m (5' 6.5\")   Wt 106.6 kg (235 lb 1.6 oz)   LMP 03/15/2019 (Approximate)   SpO2 98%   BMI 37.38 kg/m      Physical Exam  Constitutional: appears to be in no acute distress, comfortable, pleasant.   Eyes: anicteric, conjunctiva clear without drainage or erythema. BENITO.   Ears, Nose and Throat: tympanic membranes gray with LR,  nose without nasal discharge. OP: no erythema to posterior pharynx, negative post nasal drainage, tonsils +1 no erythema or exudate.  Neck: supple, thyroid palpable,not enlarged, no nodules   Cardiovascular: regular rate and rhythm, normal S1 and S2, no murmurs, rubs or gallops, peripheral pulses full and symmetric; negative peripheral edema   Respiratory: Air entry throughout. " Breathing pattern unlabored without the use of accessory muscles. Clear to auscultation A and P, no wheezes or crackles, normal breath sounds.    Gastrointestinal: rounded, soft. Positive bowel sounds x4, nontender, no masses.   Musculoskeletal: full range of motion, no edema.   Skin: pink, turgor smooth and elastic. Negative for lesions or dryness.  Neurological: normal gait, no tremor.   Psychological: appropriate mood and affect.   Lymphatic: no cervical, axillary, supraclavicular, or infraclavicular lymphadenopathy.      Diagnostics:  No labs were ordered during this visit.   No EKG required, no history of coronary heart disease, significant arrhythmia, peripheral arterial disease or other structural heart disease.    Revised Cardiac Risk Index (RCRI):  The patient has the following serious cardiovascular risks for perioperative complications:   - No serious cardiac risks = 0 points     RCRI Interpretation: 0 points: Class I (very low risk - 0.4% complication rate)         Signed Electronically by: JACK Chavez CNP  Copy of this evaluation report is provided to requesting physician.

## 2022-08-29 NOTE — PATIENT INSTRUCTIONS
Preparing for Your Surgery  Getting started  A nurse will call you to review your health history and instructions. They will give you an arrival time based on your scheduled surgery time. Please be ready to share:    Your doctor's clinic name and phone number    Your medical, surgical and anesthesia history    A list of allergies and sensitivities    A list of medicines, including herbal treatments and over-the-counter drugs    Whether the patient has a legal guardian (ask how to send us the papers in advance)  Please tell us if you're pregnant--or if there's any chance you might be pregnant. Some surgeries may injure a fetus (unborn baby), so they require a pregnancy test. Surgeries that are safe for a fetus don't always need a test, and you can choose whether to have one.   If you have a child who's having surgery, please ask for a copy of Preparing for Your Child's Surgery.    Preparing for surgery    Within 30 days of surgery: Have a pre-op exam (sometimes called an H&P, or History and Physical). This can be done at a clinic or pre-operative center.  ? If you're having a , you may not need this exam. Talk to your care team.    At your pre-op exam, talk to your care team about all medicines you take. If you need to stop any medicines before surgery, ask when to start taking them again.  ? We do this for your safety. Many medicines can make you bleed too much during surgery. Some change how well surgery (anesthesia) drugs work.    Call your insurance company to let them know you're having surgery. (If you don't have insurance, call [phone].)    Call your clinic if there's any change in your health. This includes signs of a cold or flu (sore throat, runny nose, cough, rash, fever). It also includes a scrape or scratch near the surgery site.    If you have questions on the day of surgery, call your hospital or surgery center.  COVID testing  You may need to be tested for COVID-19 before having  surgery. If so, we will give you instructions.  Eating and drinking guidelines  For your safety: Unless your surgeon tells you otherwise, follow the guidelines below.    Eat and drink as usual until 8 hours before surgery. After that, no food or milk.    Drink clear liquids until 2 hours before surgery. These are liquids you can see through, like water, Gatorade and Propel Water. You may also have black coffee and tea (no cream or milk).    Nothing by mouth within 2 hours of surgery. This includes gum, candy and breath mints.    If you drink alcohol: Stop drinking it the night before surgery.    If your care team tells you to take medicine on the morning of surgery, it's okay to take it with a sip of water.  Preventing infection    Shower or bathe the night before and morning of your surgery. Follow the instructions your clinic gave you. (If no instructions, use regular soap.)    Don't shave or clip hair near your surgery site. We'll remove the hair if needed.    Don't smoke or vape the morning of surgery. You may chew nicotine gum up to 2 hours before surgery. A nicotine patch is okay.  ? Note: Some surgeries require you to completely quit smoking and nicotine. Check with your surgeon.    Your care team will make every effort to keep you safe from infection. We will:  ? Clean our hands often with soap and water (or an alcohol-based hand rub).  ? Clean the skin at your surgery site with a special soap that kills germs.  ? Give you a special gown to keep you warm. (Cold raises the risk of infection.)  ? Wear special hair covers, masks, gowns and gloves during surgery.  ? Give antibiotic medicine, if prescribed. Not all surgeries need antibiotics.  What to bring on the day of surgery    Photo ID and insurance card    Copy of your health care directive, if you have one    Glasses and hearing aides (bring cases)  ? You can't wear contacts during surgery    Inhaler and eye drops, if you use them (tell us about these when  you arrive)    CPAP machine or breathing device, if you use them    A few personal items, if spending the night    If you have . . .  ? A pacemaker, ICD (cardiac defibrillator) or other implant: Bring the ID card.  ? An implanted stimulator: Bring the remote control.  ? A legal guardian: Bring a copy of the certified (court-stamped) guardianship papers.  Please remove any jewelry, including body piercings. Leave jewelry and other valuables at home.  If you're going home the day of surgery    You must have a responsible adult drive you home. They should stay with you overnight as well.    If you don't have someone to stay with you, and you aren't safe to go home alone, we may keep you overnight. Insurance often won't pay for this.  After surgery  If it's hard to control your pain or you need more pain medicine, please call your surgeon's office.  Questions?   If you have any questions for your care team, list them here: _________________________________________________________________________________________________________________________________________________________________________ ____________________________________ ____________________________________ ____________________________________  For informational purposes only. Not to replace the advice of your health care provider. Copyright   2003, 2019 [employer]. All rights reserved. Clinically reviewed by Arelis Roque MD. [employer] 046116 - REV 07/21.

## 2022-09-12 ENCOUNTER — ANESTHESIA (OUTPATIENT)
Dept: SURGERY | Facility: CLINIC | Age: 52
End: 2022-09-12
Payer: COMMERCIAL

## 2022-09-12 ENCOUNTER — HOSPITAL ENCOUNTER (OUTPATIENT)
Facility: CLINIC | Age: 52
Discharge: HOME | End: 2022-09-12
Attending: PODIATRIST | Admitting: PODIATRIST
Payer: COMMERCIAL

## 2022-09-12 ENCOUNTER — APPOINTMENT (OUTPATIENT)
Dept: GENERAL RADIOLOGY | Facility: CLINIC | Age: 52
End: 2022-09-12
Attending: PODIATRIST
Payer: COMMERCIAL

## 2022-09-12 ENCOUNTER — ANESTHESIA EVENT (OUTPATIENT)
Dept: SURGERY | Facility: CLINIC | Age: 52
End: 2022-09-12
Payer: COMMERCIAL

## 2022-09-12 VITALS
WEIGHT: 231.4 LBS | TEMPERATURE: 99 F | BODY MASS INDEX: 36.32 KG/M2 | RESPIRATION RATE: 16 BRPM | DIASTOLIC BLOOD PRESSURE: 78 MMHG | HEIGHT: 67 IN | HEART RATE: 57 BPM | SYSTOLIC BLOOD PRESSURE: 137 MMHG | OXYGEN SATURATION: 96 %

## 2022-09-12 DIAGNOSIS — M20.42 HAMMERTOE OF LEFT FOOT: ICD-10-CM

## 2022-09-12 DIAGNOSIS — E66.01 MORBID OBESITY (H): ICD-10-CM

## 2022-09-12 DIAGNOSIS — N39.46 MIXED STRESS AND URGE URINARY INCONTINENCE: ICD-10-CM

## 2022-09-12 DIAGNOSIS — Z98.890 POST-OPERATIVE STATE: Primary | ICD-10-CM

## 2022-09-12 PROCEDURE — 999N000179 XR SURGERY CARM FLUORO LESS THAN 5 MIN W STILLS: Mod: TC

## 2022-09-12 PROCEDURE — C1713 ANCHOR/SCREW BN/BN,TIS/BN: HCPCS | Performed by: PODIATRIST

## 2022-09-12 PROCEDURE — 28308 INCISION OF METATARSAL: CPT | Mod: LT | Performed by: PODIATRIST

## 2022-09-12 PROCEDURE — 999N000141 HC STATISTIC PRE-PROCEDURE NURSING ASSESSMENT: Performed by: PODIATRIST

## 2022-09-12 PROCEDURE — 250N000009 HC RX 250: Performed by: PODIATRIST

## 2022-09-12 PROCEDURE — 370N000017 HC ANESTHESIA TECHNICAL FEE, PER MIN: Performed by: PODIATRIST

## 2022-09-12 PROCEDURE — 360N000083 HC SURGERY LEVEL 3 W/ FLUORO, PER MIN: Performed by: PODIATRIST

## 2022-09-12 PROCEDURE — 258N000003 HC RX IP 258 OP 636: Performed by: NURSE ANESTHETIST, CERTIFIED REGISTERED

## 2022-09-12 PROCEDURE — 710N000012 HC RECOVERY PHASE 2, PER MINUTE: Performed by: PODIATRIST

## 2022-09-12 PROCEDURE — 272N000001 HC OR GENERAL SUPPLY STERILE: Performed by: PODIATRIST

## 2022-09-12 PROCEDURE — 250N000011 HC RX IP 250 OP 636: Performed by: PODIATRIST

## 2022-09-12 PROCEDURE — 250N000011 HC RX IP 250 OP 636: Performed by: NURSE ANESTHETIST, CERTIFIED REGISTERED

## 2022-09-12 PROCEDURE — 28740 FUSION OF FOOT BONES: CPT | Mod: 59 | Performed by: PODIATRIST

## 2022-09-12 PROCEDURE — 999N000065 XR FOOT PORT LEFT 2 VIEWS: Mod: LT

## 2022-09-12 DEVICE — QUICKFIX SCREW, 2 X 13 MM
Type: IMPLANTABLE DEVICE | Site: TOE | Status: FUNCTIONAL
Brand: ARTHREX®

## 2022-09-12 DEVICE — IMPLANT SYSTEM, TRIM-IT DRILL PIN, 2X100
Type: IMPLANTABLE DEVICE | Site: TOE | Status: FUNCTIONAL
Brand: ARTHREX®

## 2022-09-12 DEVICE — QUICKFIX SCREW, 2 X 11 MM
Type: IMPLANTABLE DEVICE | Site: TOE | Status: FUNCTIONAL
Brand: ARTHREX®

## 2022-09-12 RX ORDER — LABETALOL HYDROCHLORIDE 5 MG/ML
10 INJECTION, SOLUTION INTRAVENOUS EVERY 10 MIN PRN
Status: DISCONTINUED | OUTPATIENT
Start: 2022-09-12 | End: 2022-09-12 | Stop reason: HOSPADM

## 2022-09-12 RX ORDER — CEFAZOLIN SODIUM/WATER 2 G/20 ML
2 SYRINGE (ML) INTRAVENOUS SEE ADMIN INSTRUCTIONS
Status: DISCONTINUED | OUTPATIENT
Start: 2022-09-12 | End: 2022-09-12 | Stop reason: HOSPADM

## 2022-09-12 RX ORDER — KETOROLAC TROMETHAMINE 30 MG/ML
15 INJECTION, SOLUTION INTRAMUSCULAR; INTRAVENOUS
Status: DISCONTINUED | OUTPATIENT
Start: 2022-09-12 | End: 2022-09-12 | Stop reason: HOSPADM

## 2022-09-12 RX ORDER — SODIUM CHLORIDE, SODIUM LACTATE, POTASSIUM CHLORIDE, CALCIUM CHLORIDE 600; 310; 30; 20 MG/100ML; MG/100ML; MG/100ML; MG/100ML
INJECTION, SOLUTION INTRAVENOUS CONTINUOUS PRN
Status: DISCONTINUED | OUTPATIENT
Start: 2022-09-12 | End: 2022-09-12

## 2022-09-12 RX ORDER — LIDOCAINE 40 MG/G
CREAM TOPICAL
Status: DISCONTINUED | OUTPATIENT
Start: 2022-09-12 | End: 2022-09-12 | Stop reason: HOSPADM

## 2022-09-12 RX ORDER — FENTANYL CITRATE 50 UG/ML
25 INJECTION, SOLUTION INTRAMUSCULAR; INTRAVENOUS EVERY 5 MIN PRN
Status: DISCONTINUED | OUTPATIENT
Start: 2022-09-12 | End: 2022-09-12 | Stop reason: HOSPADM

## 2022-09-12 RX ORDER — AMOXICILLIN 250 MG
1-2 CAPSULE ORAL 2 TIMES DAILY
Qty: 30 TABLET | Refills: 0 | Status: SHIPPED | OUTPATIENT
Start: 2022-09-12 | End: 2022-09-21

## 2022-09-12 RX ORDER — OXYCODONE HYDROCHLORIDE 5 MG/1
5 TABLET ORAL EVERY 4 HOURS PRN
Status: DISCONTINUED | OUTPATIENT
Start: 2022-09-12 | End: 2022-09-12 | Stop reason: HOSPADM

## 2022-09-12 RX ORDER — HYDROXYZINE PAMOATE 25 MG/1
25 CAPSULE ORAL 3 TIMES DAILY PRN
Qty: 30 CAPSULE | Refills: 0 | Status: SHIPPED | OUTPATIENT
Start: 2022-09-12 | End: 2022-09-21

## 2022-09-12 RX ORDER — HYDROCODONE BITARTRATE AND ACETAMINOPHEN 5; 325 MG/1; MG/1
1-2 TABLET ORAL EVERY 4 HOURS PRN
Qty: 20 TABLET | Refills: 0 | Status: SHIPPED | OUTPATIENT
Start: 2022-09-12 | End: 2022-09-21

## 2022-09-12 RX ORDER — ONDANSETRON 4 MG/1
4 TABLET, ORALLY DISINTEGRATING ORAL EVERY 8 HOURS PRN
Qty: 4 TABLET | Refills: 0 | Status: SHIPPED | OUTPATIENT
Start: 2022-09-12 | End: 2022-09-21

## 2022-09-12 RX ORDER — ONDANSETRON 2 MG/ML
4 INJECTION INTRAMUSCULAR; INTRAVENOUS EVERY 30 MIN PRN
Status: DISCONTINUED | OUTPATIENT
Start: 2022-09-12 | End: 2022-09-12 | Stop reason: HOSPADM

## 2022-09-12 RX ORDER — CEFAZOLIN SODIUM/WATER 2 G/20 ML
2 SYRINGE (ML) INTRAVENOUS
Status: COMPLETED | OUTPATIENT
Start: 2022-09-12 | End: 2022-09-12

## 2022-09-12 RX ORDER — DEXAMETHASONE SODIUM PHOSPHATE 4 MG/ML
INJECTION, SOLUTION INTRA-ARTICULAR; INTRALESIONAL; INTRAMUSCULAR; INTRAVENOUS; SOFT TISSUE PRN
Status: DISCONTINUED | OUTPATIENT
Start: 2022-09-12 | End: 2022-09-12

## 2022-09-12 RX ORDER — BUPIVACAINE HYDROCHLORIDE 5 MG/ML
INJECTION, SOLUTION EPIDURAL; INTRACAUDAL PRN
Status: DISCONTINUED | OUTPATIENT
Start: 2022-09-12 | End: 2022-09-12 | Stop reason: HOSPADM

## 2022-09-12 RX ORDER — SODIUM CHLORIDE, SODIUM LACTATE, POTASSIUM CHLORIDE, CALCIUM CHLORIDE 600; 310; 30; 20 MG/100ML; MG/100ML; MG/100ML; MG/100ML
INJECTION, SOLUTION INTRAVENOUS CONTINUOUS
Status: DISCONTINUED | OUTPATIENT
Start: 2022-09-12 | End: 2022-09-12 | Stop reason: HOSPADM

## 2022-09-12 RX ORDER — HYDROCODONE BITARTRATE AND ACETAMINOPHEN 5; 325 MG/1; MG/1
1 TABLET ORAL
Status: DISCONTINUED | OUTPATIENT
Start: 2022-09-12 | End: 2022-09-12 | Stop reason: HOSPADM

## 2022-09-12 RX ORDER — KETOROLAC TROMETHAMINE 30 MG/ML
INJECTION, SOLUTION INTRAMUSCULAR; INTRAVENOUS PRN
Status: DISCONTINUED | OUTPATIENT
Start: 2022-09-12 | End: 2022-09-12

## 2022-09-12 RX ORDER — FENTANYL CITRATE 50 UG/ML
INJECTION, SOLUTION INTRAMUSCULAR; INTRAVENOUS PRN
Status: DISCONTINUED | OUTPATIENT
Start: 2022-09-12 | End: 2022-09-12

## 2022-09-12 RX ORDER — MEPERIDINE HYDROCHLORIDE 25 MG/ML
25 INJECTION INTRAMUSCULAR; INTRAVENOUS; SUBCUTANEOUS
Status: DISCONTINUED | OUTPATIENT
Start: 2022-09-12 | End: 2022-09-12 | Stop reason: HOSPADM

## 2022-09-12 RX ORDER — PROPOFOL 10 MG/ML
INJECTION, EMULSION INTRAVENOUS CONTINUOUS PRN
Status: DISCONTINUED | OUTPATIENT
Start: 2022-09-12 | End: 2022-09-12

## 2022-09-12 RX ORDER — FENTANYL CITRATE 50 UG/ML
25 INJECTION, SOLUTION INTRAMUSCULAR; INTRAVENOUS
Status: DISCONTINUED | OUTPATIENT
Start: 2022-09-12 | End: 2022-09-12 | Stop reason: HOSPADM

## 2022-09-12 RX ORDER — MAGNESIUM HYDROXIDE 1200 MG/15ML
LIQUID ORAL PRN
Status: DISCONTINUED | OUTPATIENT
Start: 2022-09-12 | End: 2022-09-12 | Stop reason: HOSPADM

## 2022-09-12 RX ORDER — ONDANSETRON 4 MG/1
4 TABLET, ORALLY DISINTEGRATING ORAL EVERY 30 MIN PRN
Status: DISCONTINUED | OUTPATIENT
Start: 2022-09-12 | End: 2022-09-12 | Stop reason: HOSPADM

## 2022-09-12 RX ORDER — ONDANSETRON 2 MG/ML
INJECTION INTRAMUSCULAR; INTRAVENOUS PRN
Status: DISCONTINUED | OUTPATIENT
Start: 2022-09-12 | End: 2022-09-12

## 2022-09-12 RX ADMIN — Medication 2 G: at 12:37

## 2022-09-12 RX ADMIN — DEXAMETHASONE SODIUM PHOSPHATE 4 MG: 4 INJECTION, SOLUTION INTRA-ARTICULAR; INTRALESIONAL; INTRAMUSCULAR; INTRAVENOUS; SOFT TISSUE at 13:38

## 2022-09-12 RX ADMIN — FENTANYL CITRATE 50 MCG: 50 INJECTION, SOLUTION INTRAMUSCULAR; INTRAVENOUS at 12:37

## 2022-09-12 RX ADMIN — PROPOFOL 100 MCG/KG/MIN: 10 INJECTION, EMULSION INTRAVENOUS at 12:43

## 2022-09-12 RX ADMIN — FENTANYL CITRATE 50 MCG: 50 INJECTION, SOLUTION INTRAMUSCULAR; INTRAVENOUS at 12:40

## 2022-09-12 RX ADMIN — ONDANSETRON HYDROCHLORIDE 4 MG: 2 INJECTION, SOLUTION INTRAVENOUS at 12:51

## 2022-09-12 RX ADMIN — MIDAZOLAM 2 MG: 1 INJECTION INTRAMUSCULAR; INTRAVENOUS at 12:32

## 2022-09-12 RX ADMIN — SODIUM CHLORIDE, POTASSIUM CHLORIDE, SODIUM LACTATE AND CALCIUM CHLORIDE: 600; 310; 30; 20 INJECTION, SOLUTION INTRAVENOUS at 12:32

## 2022-09-12 RX ADMIN — KETOROLAC TROMETHAMINE 30 MG: 30 INJECTION, SOLUTION INTRAMUSCULAR at 13:37

## 2022-09-12 ASSESSMENT — ACTIVITIES OF DAILY LIVING (ADL)
ADLS_ACUITY_SCORE: 35
ADLS_ACUITY_SCORE: 35

## 2022-09-12 NOTE — OP NOTE
Procedure Date: 09/12/2022    SURGEON:  Radha Zeng DPM    PREOPERATIVE DIAGNOSES:     1.  Left foot pain.  2.  Left second hammertoe.  3.  Left foot callus.    POSTOPERATIVE DIAGNOSES:  1.  Left foot pain.  2.  Left second hammertoe.  3.  Left foot callus.    PROCEDURE:    1.  Left second shortening metatarsal osteotomy.  2.  Left second PIPJ fusion.    ANESTHESIA:  MAC with local.    HEMOSTASIS:  Pneumatic ankle tourniquet with electrocautery.    ESTIMATED BLOOD LOSS:  Less than 10 mL    SPECIMENS:  None.    MATERIALS:  Two Arthrex Snap-Off screws and one Arthrex Trim-It pin.    INDICATIONS FOR PROCEDURE:  Ms. Messina is a 52-year-old female who presented to clinic with pain to the left second toe.  She states that it is curled and it rubs in her shoes.  She had a bunion on the left foot corrected years ago and the hammertoe has since developed.  She is wondering what can be done for this.  On physical exam, patient's pulses are palpable.  She does have a semirigid contracture of the left second PIPJ.  It was discussed with the patient to go in and shorten the metatarsal bone and fuse the proximal interphalangeal joint to help straighten the toe, so it is not rubbing on the top of the shoe creating a painful callus.  Risks, benefits, and complications were discussed with the patient.  No guarantees were made.  She wishes to proceed with surgery.    DESCRIPTION OF PROCEDURE:  The patient was brought to the operating room and placed on the operating table in supine position.  Anesthesia was administered and local was injected.  The foot was prepped and draped using sterile technique.  Attention was directed to the dorsal aspect of the left second toe.  A curvilinear incision was made with a #15 blade through skin down to subcutaneous tissue approximately 6 cm in length.  Blunt dissection was used down to the level of periosteum and capsule.  Once noted, a transverse incision was made through the metatarsophalangeal  joint capsule, transecting the extensor tendon at this level.  The tissue was sharply dissected off the head of the second metatarsal and a McGlamry elevator was used to release the plantar tissue attachments.  A double-cut Weil osteotomy was then performed and the capital fragment was translocated approximately 5 mm.  This was permanently fixated with 2 Arthrex Snap-Off screws after being visualized under fluoroscan in good position and more in line with the first metatarsal head.  The remaining distal eminence of bone was removed using rongeur.      Attention was then directed to the proximal interphalangeal joint.  Another transverse incision was made with a #15 blade at this level, transecting the extensor tendon.  The tissue was sharply dissected off the head of the proximal phalanx and base of the middle phalanx.  These were then removed using sagittal saw and an Arthrex Trim-It pin was retrograded into the middle phalanx to hold the toe into place and more straight.  The wound was flushed with copious amounts of normal saline.  The capsule was reapproximated using 3-0 Vicryl and the subcutaneous tissue and extensor tendon at the proximal interphalangeal joint was reapproximated using 4-0 Vicryl and the skin was reapproximated using 4-0 Prolene.      The patient's foot was placed in dry sterile dressing.  The patient tolerated the procedure and anesthesia well and was transferred to recovery with vital signs stable and vascular status intact.  The patient will be minimal weightbearing in a postop boot.    Radha Zeng DPM        D: 2022   T: 2022   MT: [id number]    Name:     SARAH BRAVODRDARYA SANFORDLisa  MRN:      [id number]        Account:        [id number]   :      1970           Procedure Date: 2022     Document: [id number]

## 2022-09-12 NOTE — BRIEF OP NOTE
[facility]    Brief Operative Note    Pre-operative diagnosis: Left foot pain [M79.672]  Hammertoe of left foot [M20.42]  Keloid scar [L91.0]  Post-operative diagnosis Same as pre-operative diagnosis    Procedure: Procedure(s):  Left second hammertoe repair with second metatarsal osteotomy  Surgeon: Surgeon(s) and Role:     * Radha Zeng DPM, Podiatry/Foot and Ankle Surgery - Primary  Anesthesia: MAC with Local   Estimated Blood Loss: Less than 10 ml    Drains: None  Specimens: * No specimens in log *  Findings:   None.  Complications: None.  Implants:   Implant Name Type Inv. Item Serial No.  Lot No. LRB No. Used Action   IMP PIN ARTHREX BIO TRIM IT [id number] AR-4152DS - [id number] Metallic Hardware/[place] IMP PIN ARTHREX BIO TRIM IT [id number] AR-4152DS  ARTHREX [id number] Left 1 Implanted   IMP SCR ARTHREX QUICKFIX TI 2X11MM AR-8930-11 - [id number] Metallic Hardware/[place] IMP SCR ARTHREX QUICKFIX TI 2X11MM AR-8930-11  ARTHREX [id number] Left 1 Implanted   IMP SCR ARTHREX QUICKFIX TI 2X13MM AR-8930-13 - [id number] Metallic Hardware/[place] IMP SCR ARTHREX QUICKFIX TI 2X13MM AR-8930-13  ARTHREX [id number] Left 1 Implanted

## 2022-09-12 NOTE — ANESTHESIA CARE TRANSFER NOTE
Patient: Veronica Messina    Procedure: Procedure(s):  Left second hammertoe repair with second metatarsal osteotomy       Diagnosis: Left foot pain [M79.672]  Hammertoe of left foot [M20.42]  Keloid scar [L91.0]  Diagnosis Additional Information: No value filed.    Anesthesia Type:   MAC     Note:    Oropharynx: oropharynx clear of all foreign objects  Level of Consciousness: awake  Oxygen Supplementation: face mask  Level of Supplemental Oxygen (L/min / FiO2): 6  Independent Airway: airway patency satisfactory and stable  Dentition: dentition unchanged  Vital Signs Stable: post-procedure vital signs reviewed and stable  Report to RN Given: handoff report given  Patient transferred to: Phase II    Handoff Report: Identifed the Patient, Identified the Reponsible Provider, Reviewed the pertinent medical history, Discussed the surgical course, Reviewed Intra-OP anesthesia mangement and issues during anesthesia, Set expectations for post-procedure period and Allowed opportunity for questions and acknowledgement of understanding      Vitals:  Vitals Value Taken Time   BP     Temp     Pulse     Resp     SpO2         Electronically Signed By: JACK Riggs CRNA  September 12, 2022  1:40 PM

## 2022-09-12 NOTE — ANESTHESIA POSTPROCEDURE EVALUATION
Patient: Veronica Messina    Procedure: Procedure(s):  Left second hammertoe repair with second metatarsal osteotomy       Anesthesia Type:  MAC    Note:     Postop Pain Control: Uneventful            Sign Out: Well controlled pain   PONV: No   Neuro/Psych: Uneventful            Sign Out: Acceptable/Baseline neuro status   Airway/Respiratory: Uneventful            Sign Out: Acceptable/Baseline resp. status   CV/Hemodynamics: Uneventful            Sign Out: Acceptable CV status   Other NRE: NONE   DID A NON-ROUTINE EVENT OCCUR? No           Last vitals:  Vitals Value Taken Time   /76 09/12/22 1345   Temp 99  F (37.2  C) 09/12/22 1340   Pulse 69 09/12/22 1345   Resp 16 09/12/22 1345   SpO2 93 % 09/12/22 1345       Electronically Signed By: Marcin Howe MD  September 12, 2022  2:24 PM

## 2022-09-12 NOTE — ANESTHESIA PREPROCEDURE EVALUATION
Anesthesia Pre-Procedure Evaluation    Patient: Veronica Messina   MRN: [id number] : 1970        Procedure : Procedure(s):  Left second hammertoe repair with second metatarsal osteotomy          Past Medical History:   Diagnosis Date     Infertility, female      Migraine       Past Surgical History:   Procedure Laterality Date     BUNIONECTOMY Left      BUNIONECTOMY Right      HC REPAIR OF HAMMERTOE,ONE Right      HYSTEROSCOPY       TONSILLECTOMY        No Known Allergies   Social History     Tobacco Use     Smoking status: Never Smoker     Smokeless tobacco: Never Used   Substance Use Topics     Alcohol use: No     Comment: occ      Wt Readings from Last 1 Encounters:   22 105 kg (231 lb 6.4 oz)        Anesthesia Evaluation   Pt has had prior anesthetic. Type: General.    No history of anesthetic complications       ROS/MED HX  ENT/Pulmonary:  - neg pulmonary ROS     Neurologic:     (+) migraines,     Cardiovascular:  - neg cardiovascular ROS     METS/Exercise Tolerance:     Hematologic:  - neg hematologic  ROS     Musculoskeletal: Comment: Hammer toe      GI/Hepatic:  - neg GI/hepatic ROS     Renal/Genitourinary:  - neg Renal ROS     Endo:     (+) Obesity,     Psychiatric/Substance Use:  - neg psychiatric ROS     Infectious Disease:  - neg infectious disease ROS     Malignancy:  - neg malignancy ROS     Other:  - neg other ROS          Physical Exam    Airway        Mallampati: I   TM distance: > 3 FB   Neck ROM: full   Mouth opening: > 3 cm    Respiratory Devices and Support         Dental  no notable dental history         Cardiovascular   cardiovascular exam normal          Pulmonary   pulmonary exam normal                OUTSIDE LABS:  CBC:   Lab Results   Component Value Date    HGB 11.4 (L) 2018    HGB 11.5 (L) 2017     BMP:   Lab Results   Component Value Date     2020     2018    POTASSIUM 4.2 2020    POTASSIUM 4.1 2018    CHLORIDE 108  07/16/2020    CHLORIDE 108 05/03/2018    CO2 26 07/16/2020    CO2 28 05/03/2018    BUN 12 07/16/2020    BUN 8 05/03/2018    CR 0.71 07/16/2020    CR 0.63 05/03/2018    GLC 91 07/16/2020     (H) 05/17/2019     COAGS: No results found for: PTT, INR, FIBR  POC: No results found for: BGM, HCG, HCGS  HEPATIC:   Lab Results   Component Value Date    ALBUMIN 3.8 07/16/2020    PROTTOTAL 8.1 07/16/2020    ALT 22 07/16/2020    AST 20 07/16/2020    ALKPHOS 76 07/16/2020    BILITOTAL 0.2 07/16/2020     OTHER:   Lab Results   Component Value Date    ANA LILIA 9.2 07/16/2020    TSH 0.98 05/17/2019       Anesthesia Plan    ASA Status:  1   NPO Status:  NPO Appropriate    Anesthesia Type: MAC.              Consents    Anesthesia Plan(s) and associated risks, benefits, and realistic alternatives discussed. Questions answered and patient/representative(s) expressed understanding.    - Discussed:     - Discussed with:  Patient         Postoperative Care    Pain management: IV analgesics, Oral pain medications, Multi-modal analgesia.   PONV prophylaxis: Ondansetron (or other 5HT-3), Dexamethasone or Solumedrol     Comments:                Marcin Howe MD

## 2022-09-20 ENCOUNTER — OFFICE VISIT (OUTPATIENT)
Dept: PODIATRY | Facility: CLINIC | Age: 52
End: 2022-09-20
Payer: COMMERCIAL

## 2022-09-20 VITALS — SYSTOLIC BLOOD PRESSURE: 106 MMHG | DIASTOLIC BLOOD PRESSURE: 72 MMHG

## 2022-09-20 DIAGNOSIS — Z98.890 POST-OPERATIVE STATE: Primary | ICD-10-CM

## 2022-09-20 PROCEDURE — 99024 POSTOP FOLLOW-UP VISIT: CPT | Performed by: PODIATRIST

## 2022-09-20 NOTE — PATIENT INSTRUCTIONS
Thank you for choosing [facility] Podiatry / Foot & Ankle Surgery!    DR HAYDEN'S CLINIC:  [facility]   [address]   [place] [address]   (Tues, Wed, Thurs AM, Fri PM)      TRIAGE LINE: [phone]  APPOINTMENTS: [phone]  RADIOLOGY: [phone]  SET UP SURGERY: [phone]  BILLING QUESTIONS: [phone]  FAX: [phone]       Follow up: 1 week.     Continue to keep foot clean and dry.   Weightbeart as tolerated in walking boot.

## 2022-09-20 NOTE — PROGRESS NOTES
Podiatry / Foot and Ankle Surgery Progress Note    September 20, 2022    Subject: Patient was seen for 1 week status post left second hammertoe repair.  Patient notes that she is doing well.  Minimal pain.  Denies fever, nausea, vomiting.  No other concerns today.    Objective:  Vitals: /72   LMP 03/15/2019 (Approximate)   BMI= There is no height or weight on file to calculate BMI.    General:  Patient is alert and orientated.  NAD.    Vascular:  DP and PT pulses are palpable.  No edema or varicosities noted.  CFT's < 3secs.  Skin temp is normal.    Neuro:  Light and gross touch sensation intact to digits, dorsum, and plantar aspects of the feet.    Derm: Dressing is clean dry and intact.  Sutures are intact.  No redness, dehiscence    Musculoskeletal:  No foot deformity noted.      Assessment: Post-operative state      Medical Decision Making/Plan: Dressing was changed today.  She will continue to keep foot dressing dry.  She will continue minimal heel weightbearing in the boot.  We will have her follow-up in 1 week for suture removal.  All questions were answered to patient satisfaction she will call for the questions or concerns.      Patient Risk Factor:  Patient is a low risk factor for infection..     Radha Zeng DPM, Podiatry/Foot and Ankle Surgery

## 2022-09-20 NOTE — LETTER
9/20/2022         RE: Veronica Messina  [address] [place] [address]        Dear Colleague,    Thank you for referring your patient, Veronica Messina, to the [facility] PODIATRY. Please see a copy of my visit note below.    Podiatry / Foot and Ankle Surgery Progress Note    September 20, 2022    Subject: Patient was seen for 1 week status post left second hammertoe repair.  Patient notes that she is doing well.  Minimal pain.  Denies fever, nausea, vomiting.  No other concerns today.    Objective:  Vitals: /72   LMP 03/15/2019 (Approximate)   BMI= There is no height or weight on file to calculate BMI.    General:  Patient is alert and orientated.  NAD.    Vascular:  DP and PT pulses are palpable.  No edema or varicosities noted.  CFT's < 3secs.  Skin temp is normal.    Neuro:  Light and gross touch sensation intact to digits, dorsum, and plantar aspects of the feet.    Derm: Dressing is clean dry and intact.  Sutures are intact.  No redness, dehiscence    Musculoskeletal:  No foot deformity noted.      Assessment: Post-operative state      Medical Decision Making/Plan: Dressing was changed today.  She will continue to keep foot dressing dry.  She will continue minimal heel weightbearing in the boot.  We will have her follow-up in 1 week for suture removal.  All questions were answered to patient satisfaction she will call for the questions or concerns.      Patient Risk Factor:  Patient is a low risk factor for infection..     Radha Zeng DPM, Podiatry/Foot and Ankle Surgery        Again, thank you for allowing me to participate in the care of your patient.        Sincerely,        Radha Zeng DPM, Podiatry/Foot and Ankle Surgery

## 2022-09-21 ENCOUNTER — VIRTUAL VISIT (OUTPATIENT)
Dept: PEDIATRICS | Facility: CLINIC | Age: 52
End: 2022-09-21
Payer: COMMERCIAL

## 2022-09-21 ENCOUNTER — MYC MEDICAL ADVICE (OUTPATIENT)
Dept: PEDIATRICS | Facility: CLINIC | Age: 52
End: 2022-09-21

## 2022-09-21 ENCOUNTER — TELEPHONE (OUTPATIENT)
Dept: PEDIATRICS | Facility: CLINIC | Age: 52
End: 2022-09-21

## 2022-09-21 DIAGNOSIS — N95.1 VASOMOTOR SYMPTOMS DUE TO MENOPAUSE: Primary | ICD-10-CM

## 2022-09-21 DIAGNOSIS — E66.9 NON MORBID OBESITY, UNSPECIFIED OBESITY TYPE: ICD-10-CM

## 2022-09-21 PROBLEM — E66.01 MORBID OBESITY (H): Status: RESOLVED | Noted: 2020-10-28 | Resolved: 2022-09-21

## 2022-09-21 PROCEDURE — 99213 OFFICE O/P EST LOW 20 MIN: CPT | Mod: GT | Performed by: NURSE PRACTITIONER

## 2022-09-21 RX ORDER — VENLAFAXINE HYDROCHLORIDE 37.5 MG/1
37.5 CAPSULE, EXTENDED RELEASE ORAL DAILY
Qty: 90 CAPSULE | Refills: 1 | Status: SHIPPED | OUTPATIENT
Start: 2022-09-21 | End: 2023-09-28

## 2022-09-21 NOTE — TELEPHONE ENCOUNTER
Spoke to Veronica li we can not put back on mychart, but can email, fax or mail.    Form was emailed to [email or web address]

## 2022-09-21 NOTE — TELEPHONE ENCOUNTER
I recently (in the past 2 weeks) completed paperwork for this patient and she is requesting a copy (it was on behalf of her insurance). My guess is that we faxed it and put it in abstracting. Could we try to hunt this down? She is requesting we send this to her through [employer] as an attachment.     Thanks,   Bertha Lyles, DNP, APRN, CNP

## 2022-09-21 NOTE — PROGRESS NOTES
Veronica is a 52 year old who is being evaluated via a billable video visit.      How would you like to obtain your AVS? MyChart  If the video visit is dropped, the invitation should be resent by: Text to cell phone: [phone]  Will anyone else be joining your video visit? No          Assessment & Plan     Vasomotor symptoms due to menopause  Prefers to avoid HRT. Will trial low dose venlafaxine with follow-up via mychart in 4-6 weeks, sooner if concerns. Can increase venlafaxine from 37.5 to 75 mg after 1-2 weeks if tolerating without side effects and having incomplete response.  - venlafaxine (EFFEXOR XR) 37.5 MG 24 hr capsule; Take 1 capsule (37.5 mg) by mouth daily          24 minutes spent on the date of the encounter doing chart review, patient visit and documentation          MEDICATIONS:        - Trial of venlafaxine       - Continue other medications without change    Return in about 4 weeks (around 10/19/2022) for Follow-up, [place].    JACK Chavez [facility]    Marva Martin is a 52 year old, presenting for the following health issues:  Menopausal Sx      HPI     Presents to discuss menopausal symptoms.     Of greatest concern is excessive sweating and hot flashes.Tends to occur overnight. Waking up overnight after 4 hours of sleep dripping in sweat. Low threshold for profuse sweating. Also occurs with light exercise or if she is worked up such as when arguing with her daughter. During these instances she will sometimes require change of clothes or even a shower.     Last menstrual cycle March 2021.     Family history of strokes. Was on some hormones back when she was trying to conceive and going through infertility treatments, was told at that time she would need to be cautious with HRT in the future. Would like to avoid HRT at all costs.     Interested in venlafaxine due to suspicion of underlying anxiety which she is learning more about through family  therapy.     Strained relationship with her daughter and  currently undergoing treatment for cancer. Work brings her relief from left stressors, loves her job and is good at it.     Patient Active Problem List   Diagnosis     Mixed stress and urge urinary incontinence     Intractable chronic migraine without aura     Morbid obesity (H)     Past Medical History:   Diagnosis Date     Infertility, female      Migraine      Current Outpatient Medications   Medication     venlafaxine (EFFEXOR XR) 37.5 MG 24 hr capsule     No current facility-administered medications for this visit.      No Known Allergies      Review of Systems    ROS: 10 point ROS neg other than the symptoms noted above in the HPI.        Objective       Vitals:  No vitals were obtained today due to virtual visit.    Physical Exam   GENERAL: Healthy, alert and no distress  EYES: Eyes grossly normal to inspection.  No discharge or erythema, or obvious scleral/conjunctival abnormalities.  RESP: No audible wheeze, cough, or visible cyanosis.  No visible retractions or increased work of breathing.    SKIN: Visible skin clear. No significant rash, abnormal pigmentation or lesions.  NEURO: Cranial nerves grossly intact.  Mentation and speech appropriate for age.  PSYCH: Mentation appears normal, affect normal/bright, judgement and insight intact, normal speech and appearance well-groomed.            Video-Visit Details    Video Start Time: 9:06 am    Type of service:  Video Visit    Video End Time:9:25 AM    Originating Location (pt. Location): Other Work    Distant Location (provider location):  [facility]     Platform used for Video Visit: [employer]

## 2022-09-27 ENCOUNTER — OFFICE VISIT (OUTPATIENT)
Dept: PODIATRY | Facility: CLINIC | Age: 52
End: 2022-09-27
Payer: COMMERCIAL

## 2022-09-27 VITALS — SYSTOLIC BLOOD PRESSURE: 112 MMHG | DIASTOLIC BLOOD PRESSURE: 70 MMHG

## 2022-09-27 DIAGNOSIS — Z98.890 POST-OPERATIVE STATE: Primary | ICD-10-CM

## 2022-09-27 PROCEDURE — 99024 POSTOP FOLLOW-UP VISIT: CPT | Performed by: SURGERY

## 2022-09-27 NOTE — LETTER
9/27/2022         RE: Veronica Messina  [address] [place] [address]        Dear Colleague,    Thank you for referring your patient, Veronica Messina, to the [facility] PODIATRY. Please see a copy of my visit note below.    Podiatry / Foot and Ankle Surgery Progress Note    September 27, 2022    Subject: Patient was seen for 2-week status post left second hammertoe repair.  Notes that she is doing well.  Denies fever, nausea, vomiting.  No concerns today.    Objective:  Vitals: /70   LMP 03/15/2019 (Approximate)   BMI= There is no height or weight on file to calculate BMI.     General:  Patient is alert and orientated.  NAD.    Vascular:  DP and PT pulses are palpable.  No edema or varicosities noted.  CFT's < 3secs.  Skin temp is normal.     Neuro:  Light and gross touch sensation intact to digits, dorsum, and plantar aspects of the feet.     Derm: Dressing is clean dry and intact.  Sutures are intact.  No redness, dehiscence     Musculoskeletal:  No foot deformity noted.       Assessment: Post-operative state        Medical Decision Making/Plan:  At this time the sutures were removed.  Patient can get the foot wet.  She can lotion the foot.  She will continue to remain in the boot with minimal weightbearing.  We will have her follow-up in 1 month for repeat x-ray and reassessment.  Likely at that time transition into shoes.     All questions were answered to patient satisfaction she will call for the questions or concerns.        Patient Risk Factor:  Patient is a low risk factor for infection..      Radha Zeng DPM, Podiatry/Foot and Ankle Surgery        Again, thank you for allowing me to participate in the care of your patient.        Sincerely,        Radha Zeng DPM, Podiatry/Foot and Ankle Surgery

## 2022-09-27 NOTE — PROGRESS NOTES
Podiatry / Foot and Ankle Surgery Progress Note    September 27, 2022    Subject: Patient was seen for 2-week status post left second hammertoe repair.  Notes that she is doing well.  Denies fever, nausea, vomiting.  No concerns today.    Objective:  Vitals: /70   LMP 03/15/2019 (Approximate)   BMI= There is no height or weight on file to calculate BMI.     General:  Patient is alert and orientated.  NAD.    Vascular:  DP and PT pulses are palpable.  No edema or varicosities noted.  CFT's < 3secs.  Skin temp is normal.     Neuro:  Light and gross touch sensation intact to digits, dorsum, and plantar aspects of the feet.     Derm: Dressing is clean dry and intact.  Sutures are intact.  No redness, dehiscence     Musculoskeletal:  No foot deformity noted.       Assessment: Post-operative state        Medical Decision Making/Plan:  At this time the sutures were removed.  Patient can get the foot wet.  She can lotion the foot.  She will continue to remain in the boot with minimal weightbearing.  We will have her follow-up in 1 month for repeat x-ray and reassessment.  Likely at that time transition into shoes.     All questions were answered to patient satisfaction she will call for the questions or concerns.        Patient Risk Factor:  Patient is a low risk factor for infection..      Radha Zeng DPM, Podiatry/Foot and Ankle Surgery

## 2022-09-27 NOTE — PATIENT INSTRUCTIONS
Thank you for choosing [facility] Podiatry / Foot & Ankle Surgery!    DR HAYDEN'S CLINIC:  [facility]   [address]   [place] [address]      TRIAGE LINE: [phone]  APPOINTMENTS: [phone]  RADIOLOGY: [phone]  SET UP SURGERY: [phone]  FAX NUMBER: [phone]  BILLING QUESTIONS: [phone]       Follow up: 1 month      SCAR CARE PROTOCOL  Scarring is an unfortunate but unavoidable part of surgery.  Every person scars differently and there is no way to predict how an individual's final scar will look.  Now that the sutures have been removed one can begin taking some steps to help minimize the appearance of scarring.    WOUND HEALING  As soon as the skin is incised during surgery, the body is taking steps to prepare for healing. After about 3 days, the body has sent cells to the incision to begin the healing process. These cells, called fibroblasts, make collagen, a protein in the skin that helps provide strength. Once the skin has been sufficiently strengthened, the sutures are removed. Over the next year, the body synthesizes new collagen and breaks down old collagen to help achieve a strong scar that allows the foot/ankle to function appropriately. This is where patients can help the appearance of the scar, as it will change over the next year.    STEPS  1. Do not expose the scar to the sun for 1 year.  2. Any sun exposure may permanently darken the appearance of the scar.  3. Wear shoes/socks or cover your scar with zinc oxide.  4. Massage the scar 2-3 times per day.  -Massage the entire length of the scar with gentle to moderate pressure.  -Pressure can help flatten the scar.  5. Lotion/Vitamin E helps keep the tissue soft.  6. Try over the counter scar products such as Mederma or Scar Zone.  -These are available at any pharmacy without a prescription.  -Patients must use these for extended periods of time (6-12 months) to see a difference.

## 2022-10-23 ENCOUNTER — HEALTH MAINTENANCE LETTER (OUTPATIENT)
Age: 52
End: 2022-10-23

## 2022-10-24 ENCOUNTER — OFFICE VISIT (OUTPATIENT)
Dept: PEDIATRICS | Facility: CLINIC | Age: 52
End: 2022-10-24
Payer: COMMERCIAL

## 2022-10-24 VITALS
OXYGEN SATURATION: 97 % | HEIGHT: 66 IN | DIASTOLIC BLOOD PRESSURE: 75 MMHG | RESPIRATION RATE: 20 BRPM | HEART RATE: 64 BPM | SYSTOLIC BLOOD PRESSURE: 113 MMHG | BODY MASS INDEX: 36.85 KG/M2 | WEIGHT: 229.3 LBS | TEMPERATURE: 97.4 F

## 2022-10-24 DIAGNOSIS — Z12.31 ENCOUNTER FOR SCREENING MAMMOGRAM FOR BREAST CANCER: ICD-10-CM

## 2022-10-24 DIAGNOSIS — E66.812 CLASS 2 OBESITY WITHOUT SERIOUS COMORBIDITY WITH BODY MASS INDEX (BMI) OF 37.0 TO 37.9 IN ADULT, UNSPECIFIED OBESITY TYPE: ICD-10-CM

## 2022-10-24 DIAGNOSIS — Z12.11 SPECIAL SCREENING FOR MALIGNANT NEOPLASM OF COLON: ICD-10-CM

## 2022-10-24 DIAGNOSIS — Z11.59 ENCOUNTER FOR HCV SCREENING TEST FOR LOW RISK PATIENT: ICD-10-CM

## 2022-10-24 DIAGNOSIS — N95.1 SYMPTOMATIC MENOPAUSAL OR FEMALE CLIMACTERIC STATES: ICD-10-CM

## 2022-10-24 DIAGNOSIS — Z00.00 ENCOUNTER FOR ROUTINE ADULT HEALTH EXAMINATION WITHOUT ABNORMAL FINDINGS: Primary | ICD-10-CM

## 2022-10-24 DIAGNOSIS — Z12.4 SCREENING FOR CERVICAL CANCER: ICD-10-CM

## 2022-10-24 DIAGNOSIS — Z11.4 SCREENING FOR HUMAN IMMUNODEFICIENCY VIRUS WITHOUT PRESENCE OF RISK FACTORS: ICD-10-CM

## 2022-10-24 DIAGNOSIS — R06.83 SNORING: ICD-10-CM

## 2022-10-24 LAB
ALBUMIN SERPL-MCNC: 3.9 G/DL (ref 3.4–5)
ALP SERPL-CCNC: 77 U/L (ref 40–150)
ALT SERPL W P-5'-P-CCNC: 15 U/L (ref 0–50)
ANION GAP SERPL CALCULATED.3IONS-SCNC: 5 MMOL/L (ref 3–14)
AST SERPL W P-5'-P-CCNC: 13 U/L (ref 0–45)
BILIRUB SERPL-MCNC: 0.4 MG/DL (ref 0.2–1.3)
BUN SERPL-MCNC: 11 MG/DL (ref 7–30)
CALCIUM SERPL-MCNC: 9.4 MG/DL (ref 8.5–10.1)
CHLORIDE BLD-SCNC: 107 MMOL/L (ref 94–109)
CHOLEST SERPL-MCNC: 182 MG/DL
CO2 SERPL-SCNC: 27 MMOL/L (ref 20–32)
CREAT SERPL-MCNC: 0.67 MG/DL (ref 0.52–1.04)
FASTING STATUS PATIENT QL REPORTED: YES
GFR SERPL CREATININE-BSD FRML MDRD: >90 ML/MIN/1.73M2
GLUCOSE BLD-MCNC: 107 MG/DL (ref 70–99)
HDLC SERPL-MCNC: 64 MG/DL
LDLC SERPL CALC-MCNC: 101 MG/DL
NONHDLC SERPL-MCNC: 118 MG/DL
POTASSIUM BLD-SCNC: 4.3 MMOL/L (ref 3.4–5.3)
PROT SERPL-MCNC: 7.8 G/DL (ref 6.8–8.8)
SODIUM SERPL-SCNC: 139 MMOL/L (ref 133–144)
TRIGL SERPL-MCNC: 85 MG/DL

## 2022-10-24 PROCEDURE — 87389 HIV-1 AG W/HIV-1&-2 AB AG IA: CPT | Performed by: NURSE PRACTITIONER

## 2022-10-24 PROCEDURE — G0145 SCR C/V CYTO,THINLAYER,RESCR: HCPCS | Performed by: NURSE PRACTITIONER

## 2022-10-24 PROCEDURE — 99396 PREV VISIT EST AGE 40-64: CPT | Performed by: NURSE PRACTITIONER

## 2022-10-24 PROCEDURE — 80053 COMPREHEN METABOLIC PANEL: CPT | Performed by: NURSE PRACTITIONER

## 2022-10-24 PROCEDURE — 87624 HPV HI-RISK TYP POOLED RSLT: CPT | Performed by: NURSE PRACTITIONER

## 2022-10-24 PROCEDURE — 80061 LIPID PANEL: CPT | Performed by: NURSE PRACTITIONER

## 2022-10-24 PROCEDURE — 86803 HEPATITIS C AB TEST: CPT | Performed by: NURSE PRACTITIONER

## 2022-10-24 PROCEDURE — 36415 COLL VENOUS BLD VENIPUNCTURE: CPT | Performed by: NURSE PRACTITIONER

## 2022-10-24 ASSESSMENT — ENCOUNTER SYMPTOMS
COUGH: 0
HEARTBURN: 0
SHORTNESS OF BREATH: 1
ARTHRALGIAS: 1
JOINT SWELLING: 0
PALPITATIONS: 0
HEADACHES: 1
HEMATURIA: 0
NAUSEA: 0
NERVOUS/ANXIOUS: 1
SORE THROAT: 0
FEVER: 0
CONSTIPATION: 0
DIZZINESS: 0
ABDOMINAL PAIN: 0
MYALGIAS: 0
EYE PAIN: 0
WEAKNESS: 0
BREAST MASS: 0
DYSURIA: 0
CHILLS: 0
HEMATOCHEZIA: 0
PARESTHESIAS: 0
DIARRHEA: 0
FREQUENCY: 0

## 2022-10-24 ASSESSMENT — PAIN SCALES - GENERAL: PAINLEVEL: NO PAIN (0)

## 2022-10-24 NOTE — PROGRESS NOTES
SUBJECTIVE:   CC: Veronica is an 52 year old who presents for preventive health visit.     Patient has been advised of split billing requirements and indicates understanding: Yes     Healthy Habits:     Getting at least 3 servings of Calcium per day:  Yes    Bi-annual eye exam:  Yes    Dental care twice a year:  Yes    Sleep apnea or symptoms of sleep apnea:  Daytime drowsiness    Diet:  Regular (no restrictions)    Frequency of exercise:  2-3 days/week    Duration of exercise:  15-30 minutes    Taking medications regularly:  Yes    Medication side effects:  Not applicable    PHQ-2 Total Score: 1    Additional concerns today:  No    No period in 1.5 years.   Recently started on venlafaxine for management of hot flashes, which has been helpful.     Snoring, which in the past has improved with weight loss. She does have daytime drowsiness. Would like to focus more on weight loss prior to pursuing sleep study.    Mammo - due  Colonoscopy - due  Pap - due    Wt Readings from Last 2 Encounters:   10/24/22 104 kg (229 lb 4.8 oz)   09/12/22 105 kg (231 lb 6.4 oz)       Today's PHQ-2 Score:   PHQ-2 ( 1999 Pfizer) 10/24/2022   Q1: Little interest or pleasure in doing things 0   Q2: Feeling down, depressed or hopeless 1   PHQ-2 Score 1   PHQ-2 Total Score (12-17 Years)- Positive if 3 or more points; Administer PHQ-A if positive -   Q1: Little interest or pleasure in doing things Not at all   Q2: Feeling down, depressed or hopeless Several days   PHQ-2 Score 1       Abuse: Current or Past (Physical, Sexual or Emotional) - No  Do you feel safe in your environment? Yes    Have you ever done Advance Care Planning? (For example, a Health Directive, POLST, or a discussion with a medical provider or your loved ones about your wishes): No, advance care planning information given to patient to review.  Patient declined advance care planning discussion at this time.    Social History     Tobacco Use     Smoking status: Never      Smokeless tobacco: Never   Substance Use Topics     Alcohol use: No     Comment: occ     If you drink alcohol do you typically have >3 drinks per day or >7 drinks per week? No    Alcohol Use 10/24/2022   Prescreen: >3 drinks/day or >7 drinks/week? No   Prescreen: >3 drinks/day or >7 drinks/week? -       Reviewed orders with patient.  Reviewed health maintenance and updated orders accordingly - Yes  BP Readings from Last 3 Encounters:   10/24/22 113/75   09/27/22 112/70   09/20/22 106/72    Wt Readings from Last 3 Encounters:   10/24/22 104 kg (229 lb 4.8 oz)   09/12/22 105 kg (231 lb 6.4 oz)   08/29/22 106.6 kg (235 lb 1.6 oz)           Breast Cancer Screening:    Breast CA Risk Assessment (FHS-7) 8/22/2022   Do you have a family history of breast, colon, or ovarian cancer? No / Unknown       Mammogram Screening: Recommended annual mammography  Pertinent mammograms are reviewed under the imaging tab.    History of abnormal Pap smear: NO - age 30-65 PAP every 5 years with negative HPV co-testing recommended     Reviewed and updated as needed this visit by clinical staff   Tobacco  Allergies    Med Hx  Surg Hx  Fam Hx  Soc Hx        Reviewed and updated as needed this visit by Provider                 Patient Active Problem List   Diagnosis     Mixed stress and urge urinary incontinence     Intractable chronic migraine without aura     Past Medical History:   Diagnosis Date     Infertility, female      Migraine      Past Surgical History:   Procedure Laterality Date     BUNIONECTOMY Left      BUNIONECTOMY Right      HC REPAIR OF HAMMERTOE,ONE Right      HYSTEROSCOPY       REPAIR HAMMER TOE Left 9/12/2022    Procedure: 1.  Left second shortening metatarsal osteotomy. 2.  Left second proximal interphalangeal joint fusion.;  Surgeon: Radha Zeng DPM, Podiatry/Foot and Ankle Surgery;  Location: [facility] OR     TONSILLECTOMY  1977     Family History   Problem Relation Age of Onset     Hypertension Mother      Diabetes  Mother      Prostate Problems Father      Arrhythmia Brother         WPW     Obesity Brother      Thyroid Disease Maternal Grandmother      Dementia Maternal Grandmother      Diabetes Maternal Grandfather      Kidney Disease Maternal Grandfather      Heart Disease Maternal Grandfather      Heart Disease Paternal Grandmother      Cancer Paternal Grandfather         throat     Lung Cancer Paternal Grandfather      Social History     Socioeconomic History     Marital status:      Spouse name: Not on file     Number of children: 1     Years of education: Not on file     Highest education level: Not on file   Occupational History     Not on file   Tobacco Use     Smoking status: Never     Smokeless tobacco: Never   Substance and Sexual Activity     Alcohol use: No     Comment: occ     Drug use: No     Sexual activity: Yes     Partners: Male   Other Topics Concern     Parent/sibling w/ CABG, MI or angioplasty before 65F 55M? Not Asked   Social History Narrative     Not on file     Social Determinants of Health     Financial Resource Strain: Low Risk      Difficulty of Paying Living Expenses: Not hard at all   Food Insecurity: No Food Insecurity     Worried About Running Out of Food in the Last Year: Never true     Ran Out of Food in the Last Year: Never true   Transportation Needs: No Transportation Needs     Lack of Transportation (Medical): No     Lack of Transportation (Non-Medical): No   Physical Activity: Sufficiently Active     Days of Exercise per Week: 3 days     Minutes of Exercise per Session: 50 min   Stress: No Stress Concern Present     Feeling of Stress : Only a little   Social Connections: Moderately Integrated     Frequency of Communication with Friends and Family: Twice a week     Frequency of Social Gatherings with Friends and Family: Once a week     Attends Baptism Services: More than 4 times per year     Active Member of Clubs or Organizations: No     Attends Club or Organization Meetings: Not  "on file     Marital Status:    Intimate Partner Violence: Not on file   Housing Stability: Low Risk      Unable to Pay for Housing in the Last Year: No     Number of Places Lived in the Last Year: 1     Unstable Housing in the Last Year: No     Current Outpatient Medications   Medication     venlafaxine (EFFEXOR XR) 37.5 MG 24 hr capsule     No current facility-administered medications for this visit.      No Known Allergies      Review of Systems   Constitutional: Negative for chills and fever.   HENT: Negative for congestion, ear pain, hearing loss and sore throat.    Eyes: Negative for pain and visual disturbance.   Respiratory: Positive for shortness of breath. Negative for cough.    Cardiovascular: Negative for chest pain, palpitations and peripheral edema.   Gastrointestinal: Negative for abdominal pain, constipation, diarrhea, heartburn, hematochezia and nausea.   Breasts:  Negative for tenderness, breast mass and discharge.   Genitourinary: Positive for urgency. Negative for dysuria, frequency, genital sores, hematuria, pelvic pain, vaginal bleeding and vaginal discharge.   Musculoskeletal: Positive for arthralgias. Negative for joint swelling and myalgias.   Skin: Negative for rash.   Neurological: Positive for headaches. Negative for dizziness, weakness and paresthesias.   Psychiatric/Behavioral: Positive for mood changes. The patient is nervous/anxious.      OBJECTIVE:   /75 (BP Location: Right arm, Patient Position: Sitting, Cuff Size: Adult Large)   Pulse 64   Temp 97.4  F (36.3  C) (Tympanic)   Resp 20   Ht 1.676 m (5' 5.98\")   Wt 104 kg (229 lb 4.8 oz)   LMP 03/15/2019 (Approximate)   SpO2 97%   BMI 37.03 kg/m    Physical Exam  Constitutional: appears to be in no acute distress, comfortable, pleasant.   Eyes: anicteric, conjunctiva clear without drainage or erythema. BENITO.   Ears, Nose and Throat: tympanic membranes gray with LR,  nose without nasal discharge. OP: no erythema to " posterior pharynx, negative post nasal drainage, tonsils +1 no erythema or exudate.  Neck: supple, thyroid palpable,not enlarged, no nodules   Breast: No nipple abnormality or discharge, no dominant masses, tenderness to palpation, axillary, supraclavicular, or infraclavicular adenopathy.  Cardiovascular: regular rate and rhythm, normal S1 and S2, no murmurs, rubs or gallops, peripheral pulses full and symmetric; negative peripheral edema   Respiratory: Air entry throughout. Breathing pattern unlabored without the use of accessory muscles. Clear to auscultation A and P, no wheezes or crackles, normal breath sounds.    Gastrointestinal: rounded, soft. Positive bowel sounds x4, nontender, no masses.   Genitourinary: external genitalia is without lesions. Introitus is normal, vaginal walls pink and moist without lesions or evidence of trauma. There is no abnormal discharge from the cervix. Cervix is pink without polyps or lesions.  Musculoskeletal: full range of motion, no edema.   Skin: pink, turgor smooth and elastic. Negative for lesions or dryness.  Neurological: normal gait, no tremor.   Psychological: appropriate mood and affect.   Lymphatic: no cervical, axillary, supraclavicular, or infraclavicular lymphadenopathy.    Diagnostic Test Results:  Labs reviewed in Epic    ASSESSMENT/PLAN:   (Z00.00) Encounter for routine adult health examination without abnormal findings  (primary encounter diagnosis)  Age appropriate screening and preventative care have been addressed today. Vaccinations have been reviewed - she plans to return for nurse visit to have these administered. Recommend annual vision exams as well as biannual dental exams. They will follow up for annual physical again in one year.   - Mammo - due  - Colonoscopy - due  - Pap - due    (Z12.4) Screening for cervical cancer  - Pap screen with HPV - recommended age 30 - 65 years          (Z12.31) Encounter for screening mammogram for breast cancer  - *MA  "Screening Digital Bilateral         (Z12.11) Special screening for malignant neoplasm of colon  - COLOGUARD([employer])         (E66.9,  Z68.37) Class 2 obesity without serious comorbidity with body mass index (BMI) of 37.0 to 37.9 in adult, unspecified obesity type  Body mass index is 37.03 kg/m .  - Lipid panel reflex to direct LDL Fasting  - Comprehensive metabolic panel (BMP + Alb, Alk Phos, ALT, AST, Total. Bili, TP)          (R06.83) Snoring  Snoring, which in the past has improved with weight loss. She does have daytime drowsiness. Would like to focus more on weight loss prior to pursuing sleep study.    (N95.1) Symptomatic menopausal or female climacteric states  Improvement on venlafaxine, continue.     (Z11.59) Encounter for HCV screening test for low risk patient  - Hepatitis C Screen Reflex to HCV RNA Quant and Genotype          (Z11.4) Screening for human immunodeficiency virus without presence of risk factors  - HIV Antigen Antibody Combo           Follow-up: Lab results pending, will follow-up as indicated after reviewing results.       COUNSELING:  Reviewed preventive health counseling, as reflected in patient instructions  Special attention given to:        Vision screening       Immunizations       Osteoporosis prevention/bone health       Colorectal Cancer Screening       Consider Hep C screening for all patients one time for ages 18-79 years       HIV screeninx in teen years, 1x in adult years, and at intervals if high risk       (Jovita)menopause management    Estimated body mass index is 37.03 kg/m  as calculated from the following:    Height as of this encounter: 1.676 m (5' 5.98\").    Weight as of this encounter: 104 kg (229 lb 4.8 oz).    Weight management plan: Discussed healthy diet and exercise guidelines    She reports that she has never smoked. She has never used smokeless tobacco.      Counseling Resources:  ATP IV Guidelines  Pooled Cohorts Equation Calculator  Breast Cancer " Risk Calculator  BRCA-Related Cancer Risk Assessment: FHS-7 Tool  FRAX Risk Assessment  ICSI Preventive Guidelines  Dietary Guidelines for Americans, 2010  USDA's MyPlate  ASA Prophylaxis  Lung CA Screening    JACK Chavez CNP  [facility]

## 2022-10-25 ENCOUNTER — ANCILLARY PROCEDURE (OUTPATIENT)
Dept: GENERAL RADIOLOGY | Facility: CLINIC | Age: 52
End: 2022-10-25
Attending: PODIATRIST
Payer: COMMERCIAL

## 2022-10-25 ENCOUNTER — OFFICE VISIT (OUTPATIENT)
Dept: PODIATRY | Facility: CLINIC | Age: 52
End: 2022-10-25
Payer: COMMERCIAL

## 2022-10-25 VITALS — BODY MASS INDEX: 36.98 KG/M2 | WEIGHT: 229 LBS | SYSTOLIC BLOOD PRESSURE: 118 MMHG | DIASTOLIC BLOOD PRESSURE: 72 MMHG

## 2022-10-25 DIAGNOSIS — Z98.890 POST-OPERATIVE STATE: Primary | ICD-10-CM

## 2022-10-25 DIAGNOSIS — Z98.890 POST-OPERATIVE STATE: ICD-10-CM

## 2022-10-25 DIAGNOSIS — R60.9 POSTOPERATIVE EDEMA: ICD-10-CM

## 2022-10-25 PROBLEM — R73.01 ELEVATED FASTING BLOOD SUGAR: Status: ACTIVE | Noted: 2022-10-25

## 2022-10-25 LAB
HCV AB SERPL QL IA: NONREACTIVE
HIV 1+2 AB+HIV1 P24 AG SERPL QL IA: NONREACTIVE

## 2022-10-25 PROCEDURE — 99024 POSTOP FOLLOW-UP VISIT: CPT | Performed by: PODIATRIST

## 2022-10-25 PROCEDURE — 73630 X-RAY EXAM OF FOOT: CPT | Mod: TC | Performed by: RADIOLOGY

## 2022-10-25 NOTE — LETTER
10/25/2022         RE: Veronica Messina  [address] [place] [address]        Dear Colleague,    Thank you for referring your patient, Veronica Messina, to the [facility]. Please see a copy of my visit note below.    Podiatry / Foot and Ankle Surgery Progress Note    October 25, 2022    Subject: Patient was seen for 6 week status post left second hammertoe repair.  Notes that she is overall doing well.  Does have some swelling to the toe and is wondering about ankle away.  No pain.  Denies fever, nausea, vomiting.    Objective:  Vitals: /72   Wt 103.9 kg (229 lb)   LMP 03/15/2019 (Approximate)   BMI 36.98 kg/m      General:  Patient is alert and orientated.  NAD.    Vascular:  DP and PT pulses are palpable.  No edema or varicosities noted.  CFT's < 3secs.  Skin temp is normal.    Neuro:  Light and gross touch sensation intact to digits, dorsum, and plantar aspects of the feet.    Derm: incision is well healed.      Musculoskeletal:  No foot deformity noted.      Imaging: left foot post op xray - I personally reviewed the xrays -hardware in good position.  Bony consolidation across osteotomy sites.  Otherwise unremarkable.    Assessment:    Post-operative state  Postoperative edema    Medical Decision Making/Plan: At this time we will have her start transitioning into shoes over the next week.  Recommend tennis shoe for the next 3 weeks and then can start gradually increasing her activity.  We talked about the swelling to her toe.  Recommend compression socks.  Also talked about physical therapy with edema control.  She like to hold off on that for now.  We will have her follow-up as needed.  All questions were answered to patient's satisfaction and she will call for the questions or concerns.      Patient Risk Factor:  Patient is a low risk factor for infection.     Radha Zeng DPM, Podiatry/Foot and Ankle Surgery        Again, thank you for allowing me to  participate in the care of your patient.        Sincerely,        Radha Zeng DPM, Podiatry/Foot and Ankle Surgery

## 2022-10-25 NOTE — PROGRESS NOTES
Podiatry / Foot and Ankle Surgery Progress Note    October 25, 2022    Subject: Patient was seen for 6 week status post left second hammertoe repair.  Notes that she is overall doing well.  Does have some swelling to the toe and is wondering about ankle away.  No pain.  Denies fever, nausea, vomiting.    Objective:  Vitals: /72   Wt 103.9 kg (229 lb)   LMP 03/15/2019 (Approximate)   BMI 36.98 kg/m      General:  Patient is alert and orientated.  NAD.    Vascular:  DP and PT pulses are palpable.  No edema or varicosities noted.  CFT's < 3secs.  Skin temp is normal.    Neuro:  Light and gross touch sensation intact to digits, dorsum, and plantar aspects of the feet.    Derm: incision is well healed.      Musculoskeletal:  No foot deformity noted.      Imaging: left foot post op xray - I personally reviewed the xrays -hardware in good position.  Bony consolidation across osteotomy sites.  Otherwise unremarkable.    Assessment:    Post-operative state  Postoperative edema    Medical Decision Making/Plan: At this time we will have her start transitioning into shoes over the next week.  Recommend tennis shoe for the next 3 weeks and then can start gradually increasing her activity.  We talked about the swelling to her toe.  Recommend compression socks.  Also talked about physical therapy with edema control.  She like to hold off on that for now.  We will have her follow-up as needed.  All questions were answered to patient's satisfaction and she will call for the questions or concerns.      Patient Risk Factor:  Patient is a low risk factor for infection.     Radha Zeng DPM, Podiatry/Foot and Ankle Surgery

## 2022-10-26 LAB
BKR LAB AP GYN ADEQUACY: NORMAL
BKR LAB AP GYN INTERPRETATION: NORMAL
BKR LAB AP HPV REFLEX: NORMAL
BKR LAB AP PREVIOUS ABNORMAL: NORMAL
PATH REPORT.COMMENTS IMP SPEC: NORMAL
PATH REPORT.COMMENTS IMP SPEC: NORMAL
PATH REPORT.RELEVANT HX SPEC: NORMAL

## 2022-10-28 ENCOUNTER — ANCILLARY PROCEDURE (OUTPATIENT)
Dept: MAMMOGRAPHY | Facility: CLINIC | Age: 52
End: 2022-10-28
Payer: COMMERCIAL

## 2022-10-28 DIAGNOSIS — Z12.31 ENCOUNTER FOR SCREENING MAMMOGRAM FOR BREAST CANCER: ICD-10-CM

## 2022-10-28 LAB
HUMAN PAPILLOMA VIRUS 16 DNA: NEGATIVE
HUMAN PAPILLOMA VIRUS 18 DNA: NEGATIVE
HUMAN PAPILLOMA VIRUS FINAL DIAGNOSIS: NORMAL
HUMAN PAPILLOMA VIRUS OTHER HR: NEGATIVE

## 2022-10-28 PROCEDURE — 77067 SCR MAMMO BI INCL CAD: CPT | Mod: TC | Performed by: RADIOLOGY

## 2022-11-03 ENCOUNTER — ALLIED HEALTH/NURSE VISIT (OUTPATIENT)
Dept: PEDIATRICS | Facility: CLINIC | Age: 52
End: 2022-11-03
Payer: COMMERCIAL

## 2022-11-03 DIAGNOSIS — Z23 NEED FOR SHINGLES VACCINE: Primary | ICD-10-CM

## 2022-11-03 DIAGNOSIS — Z23 HIGH PRIORITY FOR 2019-NCOV VACCINE: ICD-10-CM

## 2022-11-03 DIAGNOSIS — Z23 NEED FOR PROPHYLACTIC VACCINATION AND INOCULATION AGAINST INFLUENZA: ICD-10-CM

## 2022-11-03 PROCEDURE — 90750 HZV VACC RECOMBINANT IM: CPT

## 2022-11-03 PROCEDURE — 90472 IMMUNIZATION ADMIN EACH ADD: CPT

## 2022-11-03 PROCEDURE — 90682 RIV4 VACC RECOMBINANT DNA IM: CPT

## 2022-11-03 PROCEDURE — 0124A COVID-19,PF,PFIZER BOOSTER BIVALENT: CPT

## 2022-11-03 PROCEDURE — 99207 PR NO CHARGE NURSE ONLY: CPT

## 2022-11-03 PROCEDURE — 90471 IMMUNIZATION ADMIN: CPT

## 2022-11-03 PROCEDURE — 91312 COVID-19,PF,PFIZER BOOSTER BIVALENT: CPT

## 2022-11-24 LAB — NONINV COLON CA DNA+OCC BLD SCRN STL QL: NEGATIVE

## 2023-01-09 NOTE — PATIENT INSTRUCTIONS
Thank you for choosing [facility] Podiatry / Foot & Ankle Surgery!    DR HAYDEN'S CLINIC:  [facility]   [address]   [place] [address]      TRIAGE LINE: [phone]  APPOINTMENTS: [phone]  RADIOLOGY: [phone]  SET UP SURGERY: [phone]  FAX NUMBER: [phone]  BILLING QUESTIONS: [phone]       Follow up: as needed.     Call if you would like an order for physical therapy range of motion or edema control.    CALF AND ACHILLES TENDON STRECHES   Stretch gently, do not bounce.   Do each set of stretches three times a day while you are having symptoms.   Do each set of stretches once a day after symptoms are relieved.     1. Towel Stretch   Sit on hard surface with one leg straight out in front of you.   Loop a towel around the ball of the foot and pull the towel to your body.   Be sure to keep your leg straight.   You should feel tension in the calf muscle of the straight leg.   Repeat 3 times on each side for at least 15 seconds each.     2. Standing Calf Stretch   Stand about a foot from a wall and extend one leg behind you.   Keep both feet flat on the floor, toes pointed straight ahead, and the rear knee  straight.   Lean into the wall until you feel tension in the rear leg.   Hold for at least 15 seconds.   Repeat 2 times on each side.     3. Standing Achilles Tendon Stretch   Get into position of Standing Calf Stretch.   Lower the hips downward as you slightly bend the knee of the rear leg.   Keep both feet flat on the floor and toes straight ahead.   Hold for at least 15 seconds.   Repeat 2 times on each side.      Referred To Plastics For Closure Text (Leave Blank If You Do Not Want): After obtaining clear surgical margins the patient was sent to plastics for surgical repair.  The patient understands they will receive post-surgical care and follow-up from the referring physician's office.

## 2023-05-31 ENCOUNTER — OFFICE VISIT (OUTPATIENT)
Dept: PEDIATRICS | Facility: CLINIC | Age: 53
End: 2023-05-31
Payer: COMMERCIAL

## 2023-05-31 VITALS
BODY MASS INDEX: 35.34 KG/M2 | WEIGHT: 219.9 LBS | RESPIRATION RATE: 18 BRPM | HEIGHT: 66 IN | TEMPERATURE: 97.9 F | OXYGEN SATURATION: 97 % | SYSTOLIC BLOOD PRESSURE: 102 MMHG | DIASTOLIC BLOOD PRESSURE: 62 MMHG | HEART RATE: 76 BPM

## 2023-05-31 DIAGNOSIS — M54.42 CHRONIC BILATERAL LOW BACK PAIN WITH LEFT-SIDED SCIATICA: ICD-10-CM

## 2023-05-31 DIAGNOSIS — G89.29 CHRONIC BILATERAL LOW BACK PAIN WITH LEFT-SIDED SCIATICA: ICD-10-CM

## 2023-05-31 DIAGNOSIS — I86.8 SPIDER VARICOSE VEIN: Primary | ICD-10-CM

## 2023-05-31 PROCEDURE — 99213 OFFICE O/P EST LOW 20 MIN: CPT | Performed by: PHYSICIAN ASSISTANT

## 2023-05-31 RX ORDER — VENLAFAXINE HYDROCHLORIDE 37.5 MG/1
37.5 CAPSULE, EXTENDED RELEASE ORAL DAILY
Qty: 90 CAPSULE | Refills: 1 | Status: CANCELLED | OUTPATIENT
Start: 2023-05-31

## 2023-05-31 RX ORDER — PREDNISONE 20 MG/1
40 TABLET ORAL DAILY
Qty: 10 TABLET | Refills: 0 | Status: SHIPPED | OUTPATIENT
Start: 2023-05-31 | End: 2024-03-07

## 2023-05-31 ASSESSMENT — ANXIETY QUESTIONNAIRES
1. FEELING NERVOUS, ANXIOUS, OR ON EDGE: SEVERAL DAYS
3. WORRYING TOO MUCH ABOUT DIFFERENT THINGS: SEVERAL DAYS
2. NOT BEING ABLE TO STOP OR CONTROL WORRYING: SEVERAL DAYS
6. BECOMING EASILY ANNOYED OR IRRITABLE: SEVERAL DAYS
GAD7 TOTAL SCORE: 6
7. FEELING AFRAID AS IF SOMETHING AWFUL MIGHT HAPPEN: SEVERAL DAYS
IF YOU CHECKED OFF ANY PROBLEMS ON THIS QUESTIONNAIRE, HOW DIFFICULT HAVE THESE PROBLEMS MADE IT FOR YOU TO DO YOUR WORK, TAKE CARE OF THINGS AT HOME, OR GET ALONG WITH OTHER PEOPLE: SOMEWHAT DIFFICULT
GAD7 TOTAL SCORE: 6
5. BEING SO RESTLESS THAT IT IS HARD TO SIT STILL: NOT AT ALL

## 2023-05-31 ASSESSMENT — PAIN SCALES - GENERAL: PAINLEVEL: NO PAIN (1)

## 2023-05-31 ASSESSMENT — PATIENT HEALTH QUESTIONNAIRE - PHQ9
5. POOR APPETITE OR OVEREATING: SEVERAL DAYS
SUM OF ALL RESPONSES TO PHQ QUESTIONS 1-9: 7

## 2023-05-31 NOTE — PROGRESS NOTES
"  Assessment & Plan     Spider varicose vein    - Vascular Surgery Referral; Future    Chronic bilateral low back pain with left-sided sciatica    - Physical Therapy Referral; Future  - predniSONE (DELTASONE) 20 MG tablet; Take 2 tablets (40 mg) by mouth daily          5/31/2023     1:52 PM   PHQ   PHQ-9 Total Score 7   Q9: Thoughts of better off dead/self-harm past 2 weeks Not at all          5/31/2023     1:52 PM   NICK-7 SCORE   Total Score 6            BMI:   Estimated body mass index is 35.49 kg/m  as calculated from the following:    Height as of this encounter: 1.676 m (5' 6\").    Weight as of this encounter: 99.7 kg (219 lb 14.4 oz).           MONTRELL Gilmore [facility] PEDRO Martin is a 53 year old, presenting for the following health issues:  Varicose Vein        5/31/2023    12:51 PM   Additional Questions   Roomed by Leah   Accompanied by none         5/31/2023    12:51 PM   Patient Reported Additional Medications   Patient reports taking the following new medications none     History of Present Illness       Reason for visit:  Spider veins left leg pain  Symptom onset:  More than a month  Symptoms include:  Left leg pain  Symptom intensity:  Moderate  Symptom progression:  Worsening  Had these symptoms before:  No  What makes it worse:  Constant  What makes it better:  Warm showers    She eats 4 or more servings of fruits and vegetables daily.She consumes 2 sweetened beverage(s) daily.She exercises with enough effort to increase her heart rate 30 to 60 minutes per day.  She exercises with enough effort to increase her heart rate 6 days per week. She is missing 1 dose(s) of medications per week.  She is not taking prescribed medications regularly due to remembering to take.       Pain History:  When did you first notice your pain? 9 months ago   Have you seen this provider for your pain in the past? No   Where in your body do your have pain? Left hip and " "thigh  Are you seeing anyone else for your pain? No  What makes your pain better? none  What makes your pain worse? none  How has pain affected your ability to work? Not applicable  Who lives in your household? Self, spouse, daughter                 View : No data to display.                   View : No data to display.                  5/31/2023     1:08 PM   PEG Score   PEG Total Score 2.33       Chronic Pain - Initial Assessment:    How would you describe your pain? Constant ache, starts in left buttock and travels to behind knee when standing  Have you had any recent changes to the severity or character of your pain? none  Is there an underlying cause that has been identified? no  Has your ability to work or do daily activities changed recently because of your pain? no  Which of these pain treatments have you tried? Rest  Previous medication treatments:    Patient is here as she initially made this appointment to address her spider veins on her legs that she feels are mostly unsightly.  She occasionally gets discomfort from them, but mostly is bothered by the look of them.      Also, she has acute on chronic low back pain with pain that goes down her left leg.  She saw therapy for this in 2020, but the pain is returning.  She says that the pain has woken her up at night as well.        Review of Systems   Constitutional, HEENT, cardiovascular, pulmonary, gi and gu systems are negative, except as otherwise noted.      Objective    /62 (BP Location: Right arm, Patient Position: Sitting, Cuff Size: Adult Large)   Pulse 76   Temp 97.9  F (36.6  C) (Tympanic)   Resp 18   Ht 1.676 m (5' 6\")   Wt 99.7 kg (219 lb 14.4 oz)   LMP 03/15/2019 (Approximate)   SpO2 97%   BMI 35.49 kg/m    Body mass index is 35.49 kg/m .  Physical Exam   GENERAL: healthy, alert and no distress  EYES: Eyes grossly normal to inspection, PERRL and conjunctivae and sclerae normal  MS: no gross musculoskeletal defects noted, no " edema  SKIN: no suspicious lesions or rashes  NEURO: Normal strength and tone, mentation intact and speech normal  BACK: no CVA tenderness, no paralumbar tenderness  PSYCH: mentation appears normal, affect normal/bright      Shana Lockhart PA-C

## 2023-05-31 NOTE — PROGRESS NOTES
{PROVIDER CHARTING PREFERENCE:110873}    Marva Martin is a 53 year old, presenting for the following health issues:  Varicose Vein        5/31/2023    12:51 PM   Additional Questions   Roomed by Leah   Accompanied by none         5/31/2023    12:51 PM   Patient Reported Additional Medications   Patient reports taking the following new medications none     History of Present Illness       Reason for visit:  Spider veins left leg pain  Symptom onset:  More than a month  Symptoms include:  Left leg pain  Symptom intensity:  Moderate  Symptom progression:  Worsening  Had these symptoms before:  No  What makes it worse:  Constant  What makes it better:  Warm showers    She eats 4 or more servings of fruits and vegetables daily.She consumes 2 sweetened beverage(s) daily.She exercises with enough effort to increase her heart rate 30 to 60 minutes per day.  She exercises with enough effort to increase her heart rate 6 days per week. She is missing 1 dose(s) of medications per week.  She is not taking prescribed medications regularly due to remembering to take.         {additonal problems for provider to add (Optional):732971}      Review of Systems   {ROS COMP (Optional):071772}      Objective    LMP 03/15/2019 (Approximate)   There is no height or weight on file to calculate BMI.  Physical Exam   {Exam List (Optional):043708}    {Diagnostic Test Results (Optional):068448}    {AMBULATORY ATTESTATION (Optional):988812}

## 2023-07-05 ENCOUNTER — THERAPY VISIT (OUTPATIENT)
Dept: PHYSICAL THERAPY | Facility: CLINIC | Age: 53
End: 2023-07-05
Attending: PHYSICIAN ASSISTANT
Payer: COMMERCIAL

## 2023-07-05 DIAGNOSIS — M25.551 BILATERAL HIP PAIN: ICD-10-CM

## 2023-07-05 DIAGNOSIS — M25.552 BILATERAL HIP PAIN: ICD-10-CM

## 2023-07-05 DIAGNOSIS — M54.42 CHRONIC BILATERAL LOW BACK PAIN WITH LEFT-SIDED SCIATICA: Primary | ICD-10-CM

## 2023-07-05 DIAGNOSIS — G89.29 CHRONIC BILATERAL LOW BACK PAIN WITH LEFT-SIDED SCIATICA: Primary | ICD-10-CM

## 2023-07-05 PROCEDURE — 97110 THERAPEUTIC EXERCISES: CPT | Mod: GP | Performed by: PHYSICAL THERAPIST

## 2023-07-05 PROCEDURE — 97161 PT EVAL LOW COMPLEX 20 MIN: CPT | Mod: GP | Performed by: PHYSICAL THERAPIST

## 2023-07-05 NOTE — PROGRESS NOTES
PHYSICAL THERAPY EVALUATION  Type of Visit: Evaluation    See electronic medical record for Abuse and Falls Screening details.    Subjective      Presenting condition or subjective complaint: Pain down the left leg worse with sleeping at night on the Left and right.  Left worse than right.  Pain is located on the outer side of the leg down to the calf on the Left.  She cannot sleep over 6 hours and normal is 7-8 hours. She has tried OTC pain relief and some exercise.  She is not doing a lot of stretching.  Patient's  was diagonsed with terminal liver cancer and has a daughter with mental health concerns.  Symptoms are worse with: driving, crossing legs, transitional movements, sleeping.  Medications: Meds for menopause, aleve, tylenol,  Date of onset: 05/31/23 (MD order date listed.)    Relevant medical history: Menopause   Dates & types of surgery:      Prior diagnostic imaging/testing results: MRI; X-ray     Prior therapy history for the same diagnosis, illness or injury: No PT 2021    Prior Level of Function   Transfers: Independent  Ambulation: Independent  ADL: Independent    Living Environment  Social support: With a significant other or spouse   Type of home: House   Stairs to enter the home: Yes 10 Is there a railing: Yes   Ramp: No   Stairs inside the home: Yes 10 Is there a railing: Yes   Help at home: None  Equipment owned:       Employment: Yes , (Stiff at the end of the day)  Hobbies/Interests: reading, gym, shopping    Patient goals for therapy: stand for long periods of time, flexibility and sit for more than an hour       Objective   HIP EVALUATION  PAIN: Pain Level at Rest: 0/10 increases with crossing legs and sleeping at night. Pain located mostly in the lateral thighs L greater than R.   POSTURE: Sitting Posture: Rounded shoulders, Forward head  GAIT:   Gait Deviations: Stance time decreased  Stride length decreased  BALANCE/PROPRIOCEPTION: WNL  ROM:   (Degrees) Left AROM  Left PROM  Right AROM Right PROM   Hip Flexion  120 tightness and pain in lateral hip  125 tightness   Hip Extension       Hip Abduction       Hip Adduction       Hip Internal Rotation  45 degrees  45 degrees   Hip External Rotation  60 degrees  65 degrees    Lumbar side bending WNL  WNL    Lumbar Rotation WNL  WNL    Lumbar Side glide     Lumbar Flexion Finger tips to toes with pull in lateral thighs and low back.    Lumbar Extension WNL pain free   STRENGTH: Glute med and max strength not assessed this date due to level of patients pain.  Will assess as symptoms resolve.   SPECIAL TESTS:    Left Right   KRISTEL Negative  Negative    FADIR/Labrum/LUIS Negative  Negative    Femoral Nerve Negative  Negative    Arie's     Piriformis     Quadrant Testing     SLR     Slump     Stork with Extension     Monico     Scour Negative Negative      FUNCTIONAL TESTS: sit to stand: WNL and pain free.   PALPATION:   + Tenderness At Location Left Right   Ischial Tuberosity - -   Greater Trochanter + +   IT Band + +   Hip Flexors - -   Piriformis + +   PSIS - -   ASIS     Adductors     Abductors + +   Iliac Crest - -   Glut Medius + +   Bursa + +   JOINT MOBILITY: WNL  MYOTOMES:    Left Right   T12-L3 (Hip Flexion) 4+ 4+   L2-4 (Quads)  5 5   L4 (Ankle DF) 5 5   L5 (Great Toe Ext) 5 5          DTR S:    Left Right   C5 (Biceps)     C6 (Brachioradialis)     C7 (Triceps)     L4 (Quad) 2 2   S1 (Achilles) 1 1     CORD SIGNS: Clonus absent through B soleus  DERMATOMES: denies numbness and tingling  PALPATION:   + Tenderness At Location Left Right   Quadratus Lumborum + +   Erector Spinae + +   Piriformis  + +   PSIS     ASIS     Iliac Crest     Glut Medius + +   Greater Trochanter + +   Ischial Tuberosity     Hamstrings     Hip Flexors     Vertebral  + +     SPINAL SEGMENTAL CONCLUSIONS: increased stiffness and pain noted from L2-S1. no symptoms noted with Coccyx CPA.   Assessment & Plan   CLINICAL IMPRESSIONS   Medical Diagnosis: Chronic  bilateral low back pain with left-sided sciatica    Treatment Diagnosis: Chronic bilateral low back pain with left-sided sciatica, B hip pain   Impression/Assessment: Patient is a 53 year old female with Low Back/thigh complaints.  The following significant findings have been identified: Pain, Decreased ROM/flexibility, Decreased joint mobility, Decreased strength, Decreased proprioception, Impaired muscle performance and Decreased activity tolerance. These impairments interfere with their ability to perform self care tasks, work tasks, recreational activities, household chores, driving , household mobility and community mobility as compared to previous level of function.     Clinical Decision Making (Complexity):   Clinical Presentation: Stable/Uncomplicated  Clinical Presentation Rationale: based on medical and personal factors listed in PT evaluation  Clinical Decision Making (Complexity): Low complexity    PLAN OF CARE  Treatment Interventions:  Modalities: Cryotherapy, Dry Needling, E-stim, Hot Pack, Mechanical Traction, Ultrasound  Interventions: Manual Therapy, Neuromuscular Re-education, Therapeutic Activity, Therapeutic Exercise, Self-Care/Home Management    Long Term Goals     PT Goal 1  Goal Identifier: Sleep  Goal Description: Patient will sleep 8 hours without waking due to pain  Rationale:  (To restore restful sleep)  Goal Progress: Patient currently sleeps 6 hours with pain  Target Date: 08/30/23      Frequency of Treatment: 1 time per week  Duration of Treatment: 8 weeks    Education Assessment:   Learner/Method: Patient;Demonstration;Pictures/Video;No Barriers to Learning    Risks and benefits of evaluation/treatment have been explained.   Patient/Family/caregiver agrees with Plan of Care.     Evaluation Time:     PT Eval, Low Complexity Minutes (05116): 25    Signing Clinician: Ailin Camacho, PT, DPT, CMT, OCS

## 2023-07-12 ENCOUNTER — THERAPY VISIT (OUTPATIENT)
Dept: PHYSICAL THERAPY | Facility: CLINIC | Age: 53
End: 2023-07-12
Attending: PHYSICIAN ASSISTANT
Payer: COMMERCIAL

## 2023-07-12 DIAGNOSIS — M25.551 BILATERAL HIP PAIN: ICD-10-CM

## 2023-07-12 DIAGNOSIS — M25.552 BILATERAL HIP PAIN: ICD-10-CM

## 2023-07-12 DIAGNOSIS — M54.42 CHRONIC BILATERAL LOW BACK PAIN WITH LEFT-SIDED SCIATICA: Primary | ICD-10-CM

## 2023-07-12 DIAGNOSIS — G89.29 CHRONIC BILATERAL LOW BACK PAIN WITH LEFT-SIDED SCIATICA: Primary | ICD-10-CM

## 2023-07-12 PROCEDURE — 97140 MANUAL THERAPY 1/> REGIONS: CPT | Mod: GP | Performed by: PHYSICAL THERAPIST

## 2023-07-12 PROCEDURE — 97110 THERAPEUTIC EXERCISES: CPT | Mod: GP | Performed by: PHYSICAL THERAPIST

## 2023-07-17 ENCOUNTER — OFFICE VISIT (OUTPATIENT)
Dept: VASCULAR SURGERY | Facility: CLINIC | Age: 53
End: 2023-07-17
Attending: PHYSICIAN ASSISTANT
Payer: COMMERCIAL

## 2023-07-17 DIAGNOSIS — I86.8 SPIDER VARICOSE VEIN: ICD-10-CM

## 2023-07-17 PROCEDURE — 99202 OFFICE O/P NEW SF 15 MIN: CPT | Performed by: SURGERY

## 2023-07-17 NOTE — NURSING NOTE
Patient Reported symptoms:    Bilateral Leg   Heaviness None of the time   Achiness A little of the time   Swelling None of the time   Throbbing None of the time   Itching None of the time   Appearance Slightly noticeable   Impact on work/activities Symptoms but full able to participate

## 2023-07-17 NOTE — PROGRESS NOTES
[facility]: Ladonna Messina comes to see us today for evaluation of her spider veins.  She has been evaluated by Shana Lockhart PA-C who felt that evaluation was indicated.  She has noted for many years a small complex on her proximal right anterior lateral calf.  However, more recently she is noted spider veins particular in the right proximal thigh and several on the left.  These are not painful and she has never had complications.  She is concerned about the cosmetic.        PMH: No allergies.   Medications: Effexor XR, Deltasone   Medical: History of depression    History of migraines    Chronic bilateral low back pain and sciatica left--course of steroids    Bilateral hip pain.   Surgical: Remote left bunion surgery    October 2022 left second hammertoe surgery.    No issues with anesthesia or bleeding.    10/24/2022 laboratory: SCr= 0.67   LDL= 101 glucose= 107    Weight= 99.7 kg.  BMI= 35.5 kg meter square    Social history: Unfortunately her  has terminal cancer.  He is stable at this time and she wants to get many of her medical procedures performed while his health is still stable.  She does not have any concerns about doing sclerotherapy during the summer months.      Exam: Alert and appropriate.  Very pleasant and talkative.   +3 palpable DP pulses bilaterally no arterial insufficiency   No large varicose veins in either leg visible or palpable   Diffuse spider veins.  More prominent in the right proximal anterior and anterior lateral thigh.  Complex on the right anterior lateral proximal calf.  Skin is soft and supple.      IMPRESSION: Diffuse spider veins more prominent in the right leg.  Discussed the pros and cons of sclerotherapy.  Whether it may be some bruising and potentially even a small skin ulcer though the compounds were used presently do not have this occur very often.  I would suspect that she would respond to a single treatment session since her spider veins  are not excessive.  We discussed our compression protocol.  Also discussed the concerns about sun exposure.  Due to her situation she would like to have this done as soon as possible and is not feel that she needs to wait till fall or winter.    Bilateral VCSS=1 CEAP: C1    Erick Garcia MD

## 2023-07-17 NOTE — LETTER
7/17/2023         RE: Veronica Messina  [address] [address]        Dear Colleague,    Thank you for referring your patient, Veronica Messina, to the [facility] [place]. Please see a copy of my visit note below.     [facility]: Ladonna Messina comes to see us today for evaluation of her spider veins.  She has been evaluated by Shana Lockhart PA-C who felt that evaluation was indicated.  She has noted for many years a small complex on her proximal right anterior lateral calf.  However, more recently she is noted spider veins particular in the right proximal thigh and several on the left.  These are not painful and she has never had complications.  She is concerned about the cosmetic.        PMH: No allergies.   Medications: Effexor XR, Deltasone   Medical: History of depression    History of migraines    Chronic bilateral low back pain and sciatica left--course of steroids    Bilateral hip pain.   Surgical: Remote left bunion surgery    October 2022 left second hammertoe surgery.    No issues with anesthesia or bleeding.    10/24/2022 laboratory: SCr= 0.67   LDL= 101 glucose= 107    Weight= 99.7 kg.  BMI= 35.5 kg meter square    Social history: Unfortunately her  has terminal cancer.  He is stable at this time and she wants to get many of her medical procedures performed while his health is still stable.  She does not have any concerns about doing sclerotherapy during the summer months.      Exam: Alert and appropriate.  Very pleasant and talkative.   +3 palpable DP pulses bilaterally no arterial insufficiency   No large varicose veins in either leg visible or palpable   Diffuse spider veins.  More prominent in the right proximal anterior and anterior lateral thigh.  Complex on the right anterior lateral proximal calf.  Skin is soft and supple.      IMPRESSION: Diffuse spider veins more prominent in the right leg.  Discussed the pros and cons of  sclerotherapy.  Whether it may be some bruising and potentially even a small skin ulcer though the compounds were used presently do not have this occur very often.  I would suspect that she would respond to a single treatment session since her spider veins are not excessive.  We discussed our compression protocol.  Also discussed the concerns about sun exposure.  Due to her situation she would like to have this done as soon as possible and is not feel that she needs to wait till fall or winter.    Bilateral VCSS=1 CEAP: C1    Erick Garcia MD       Again, thank you for allowing me to participate in the care of your patient.        Sincerely,        Erick Garcia MD

## 2023-07-17 NOTE — PATIENT INSTRUCTIONS
Varicose Veins and Spider Veins    Varicose veins are swollen, enlarged veins most often found in the legs. They are usually blue or purple in color and may bulge, twist, and stand out under the skin. Spider veins are small veins just under your skin that can look red, blue or purple.        Normally, veins return blood from the body to the heart. The leg veins have one-way valves that prevent blood from flowing backward in the vein. When the valves are weak or damaged, blood backs up in the veins. This may cause some of the veins to swell and bulge and become varicose veins.     Symptoms  Varicose veins may or may not cause symptoms. If symptoms do occur, they can include:  Legs that feel tired, achy, heavy, or itchy  Leg swelling  Leg muscle cramps  Skin changes, such as discoloration, dryness, redness, or rash (in more severe cases, you may also have sores on the skin called venous leg ulcers)    Risk factors  There are a number of factors that increase the risk for varicose veins. These can include:   Being a woman  Being older  Sitting or standing for long periods  Being overweight  Being pregnant  Having a family history of varicose veins  Hormones, birth control pills    Treatment starts with simple self-help measures (see below). If these don't help, there are many procedures that can be done to shrink or remove varicose veins. Your healthcare provider can tell you more about these options, if needed.     Home care  Support or compression stockings will likely be prescribed. If so, be sure to wear them as directed. They may help improve blood flow.  Exercising helps strengthen your leg muscles and improve blood flow. To get the most benefit, choose exercises such as walking, swimming, or cycling. Also try to exercise for at least 30 minutes on most days.  Raising your legs above heart level will help relieve swelling and keep blood from pooling in veins. Try to elevate your legs for 15 to 20 minutes at  the end of the day, and whenever you're relaxing. To make sure your legs are raised above heart level, prop them up on cushions or large pillows.  To keep blood moving when you have to sit or stand for long periods, try these tips:  At work, take walking breaks instead of coffee breaks. Walk during your lunch hour. Or try flexing your feet up and down 10 times each hour.  When standing, raise yourself up and down on your toes, or rock back and forth on your heels.  If you are overweight, talk with your healthcare provider about setting up a weight-loss plan. Maintaining a healthy weight can help reduce the strain on your veins. It may also improve symptoms, such as swelling and aching.  If you have dryness and itching, ask your provider about special lotions that can be applied to the skin to help improve symptoms.     Follow-up care  Follow up with your healthcare provider, or as directed. If imaging tests were done, you'll be told the results and if there are any new findings that affect your care.     When to seek medical advice  Call your healthcare provider right away if any of these occur:  Sudden, severe leg swelling, pain, or redness  Symptoms worsen, or they don't improve with self-care  Bleeding from any affected veins  Ulcers form on the legs, ankles, or feet  Fever of 100.4 F (38 C) or higher, or as advised by your provider    [employer] last reviewed this educational content on 4/1/2018 2000-2021 [employer]. All rights reserved. This information is not intended as a substitute for professional medical care. Always follow your healthcare professional's instructions.    Self-Care for Spider and Varicose Veins    Your healthcare provider may suggest that you try self-care. Exercising and maintaining a healthy weight may keep problem veins from getting worse. Wearing elastic stockings and elevating your legs can help improve blood flow. Taking breaks when you sit or stand helps,  too.        Wearing compression stockings  Compression stockings gently squeeze veins so blood flows upward. If you need compression stockings, your healthcare provider can prescribe them for you. Follow your healthcare provider's advice about how and when to wear them. Compression stockings come in several different levels of pressure. Ask your healthcare provider which level of pressure would help you the most.     Raising your legs above heart level will help relieve swelling and keep blood from pooling in veins. Try to elevate your legs for 15 to 20 minutes at the end of the day, and whenever you're relaxing. To make sure your legs are raised above heart level, prop them up on cushions or large pillows.    To keep blood moving when you have to sit or stand for long periods, try these tips:  At work, take walking breaks instead of coffee breaks. Walk during your lunch hour. Or try flexing your feet up and down 10 times each hour.  When standing, raise yourself up and down on your toes, or rock back and forth on your heels.    [employer] last reviewed this educational content on 11/1/2019 2000-2021 [employer]. All rights reserved. This information is not intended as a substitute for professional medical care. Always follow your healthcare professional's instructions.    Treatment Options    Sclerotherapy  Your healthcare provider will inject the vein with a special chemical that will quickly close the vein from the inside. This is particularly useful for spider veins and smaller varicose veins.      If you have large varicose veins, surgery may be the best choice. But it will not prevent new varicose veins from forming. Surgery is most often done in a surgery center or in the office.    If surgery is recommended for you, your surgery will be tailored to your needs. Varicose veins may be tied off (ligation), destroyed, or removed. Blood will then flow through the healthy veins. One or more of the  following techniques may be used:    Ablation (laser or radiofrequency)  A tiny cut in the skin is made near the varicose vein. A small tube called a catheter is inserted into the vein. Energy or heat released from the catheter tip will make the vein walls collapse and stick together, stopping all blood flow through the vein.         Ablation (glue)  A tiny cut in the skin is made near the varicose vein. A small tube called a catheter is inserted into the vein. Droplets of glue are deposited into the vein to make vein walls collapse and stick together stopping blood flow through the vein.    Microphlebectomy or ambulatory phlebectomy  A special hook is used to gently take out a varicose vein through tiny incisions. Microphlebectomy may be done in your healthcare provider's office.      Vein stripping and ligation (rare)  In more severe cases, the surgeon may tie off and remove veins by making smaller cuts in the skin. Smaller branching veins may also be tied off or removed.    Know about the risks  Your healthcare provider will talk with you about the risks of surgery. These include:  Bleeding or swelling  A sense of numbness, burning, or tingling in areas near the procedure  Edema or swelling in the legs  Clots in the deep veins that may travel to the lungs  Infection  Scarring  Inflammation related to the glue    [employer] last reviewed this educational content on 11/1/2019 (Sclerotherapy image) 12/1/2019 (Radiofrequency ablation image, Microphlebectomy image)    5972-3560 [employer]. All rights reserved. This information is not intended as a substitute for professional medical care. Always follow your healthcare professional's instructions.              Sclerotherapy: Pre-Treatment Instructions    Recommended Sessions:  2-4 treatment sessions    Pricing: Full session - $407                 *Payment is due at the time of visit following the treatment    Time Required per Treatment Session - About 45  minutes  Please come in 15 minutes before your scheduled appointment.  30 min.  Sclerotherapy treatments last approximately 30 minutes.  5 min.    A staff member will wipe your legs off with warm water and dry them with a wash cloth.                 Then you can put your compression hose on, get dressed and check out.  10 min.  After your treatment, you will be asked to walk around for 10 minutes before you get in your car.    Medications  Five days before your appointment, discontinue aspirin (Bufferin, Anacin, etc.) and Ibuprofen (Motrin, Advil, Aleve, etc.) to reduce bruising. Resume these medications the day following the treatment.    Leg Preparation  Do not shave your legs or apply any oil, lotion or powder the night before or the day of your treatment.    Clothing  Shorts:  Bring a pair of loose, comfortable shorts to wear during your treatment (or you can choose to wear ours). Shoes: Bring comfortable shoes to accommodate the compression hose after your treatment. Do not wear flip-flops or thong-style sandals unless you have open-toe compression hose.    Photographs  Photos will be taken before each treatment. This helps monitor your progress.    Injections  The physician will inject your veins with the sclerotherapy solution chosen to meet your specific needs.    Compression Hose  Please bring your compression hose if you have them. They may also be reserved for you at our clinic. Compression hose must be worn immediately after each sclerotherapy treatment.  The hose must be compression level 20-30, and they must be worn for 24 hours straight after your treatment.  If you have never worn compression hose before, a staff member will teach you how to put them on.             You cannot have a treatment without compression hose.               They are critical to the success of your treatment!    You may purchase your compression hose from us. We will measure you and have the hose available when you come for  your treatment.    Cancellation and Rescheduling  If you need to cancel or reschedule your sclerotherapy treatment, please give our office at least 24 hour notice.    Sclerotherapy: Basic Information        What is sclerotherapy?  Sclerotherapy is a treatment for  spider  veins.  Spider  veins are small veins just under your skin that can look red, blue or purple. Most  spider  veins are only a cosmetic problem.  Spider  veins are not useful and treating them will not affect your circulation.    How does sclerotherapy work?  1.  Injections: A very small needle is used to inject a solution into the  spider  veins. The solution irritates the cells that line the vein walls. This causes the veins to collapse. The vein walls to stick together and they can no longer carry blood. Different solutions are used based on the size of the veins.  2.  Compression:  The spider veins are kept collapsed by wearing compression stockings. Your body will break down and absorb the treated veins. You wear the compression hose for 24 hours after the treatment and then for 4 more days during your waking hours only.    How does the body heal after sclerotherapy?  The process is similar to how your body heals after a bad bruise. It takes 4-6 weeks or more for the healing to be complete. When the healing is complete, the vein is no longer visible. It may take more than one treatment.    How do I get the best results?  It is important to follow the post-sclerotherapy instructions. The best results require time and patience. The injection sites will continue to heal and fade for months after a treatment. Please discuss your expectations with your doctor to keep them realistic. Your doctor will do everything possible to meet or exceed your expectations.    How many treatments are needed?  After your initial exam, your doctor will give you an estimate of the number of treatments that may be required. It depends upon the size, type, and quantity  of your  spider  veins and on the doctor's assessment, your history and expectations. You may end up needing fewer or more treatments.    How soon can I have another treatment?  Additional treatments are scheduled every 4-6 weeks to allow time for the body to respond to the previous treatment.    Common Side Effects:  Itching  The areas that were injected may itch. This is usually mild and lasts less than a day. Do not use lotions or creams on your legs until the injection sites have healed over.    Pain  It is common to have some tenderness at the injection sites. Injection of the solution can be uncomfortable, but is usually well tolerated by most patients. The tenderness is temporary, lasting 24 hours at most. Tylenol or Ibuprofen can be used, if needed, following the product directions.    Bruising  This may occur at the injections sites. Bruising may be minimized by avoiding Aspirin and Ibuprofen products for five days before each treatment session.    Hyperpigmentation  A light brown discoloration of the skin may develop along the veins in the areas injected. Approximately 20-30% of patients treated note the discoloration (which is lighter and less obvious than the veins that are being treated). The hyperpigmentation usually fades in a couple of weeks, but may take several months to a year to totally resolve. There is a 1% chance of hyperpigmentation continuing after one year.    Trapped blood  A small amount of blood may become trapped and hardened in the veins. This may feel like a knot or cord and it may look dark blue or bruised. This is a common occurrence. You may need to return before your next treatment so this area can be drained to remove the trapped blood. This will reduce the hyperpigmentation that can occur. The chance of this occurring can be decreased with proper use of compression hose after your treatment.    Matting  Matting is the formation of new, fine  spider  veins in the area injected.   It occurs in approximately 10% of patients injected. The exact reason for this is unknown. If untreated, the matting usually resolves in 3 to 12 months, but very rarely, it can be permanent. If the matting does not fade, it can be re-injected.    Rare Side Effects:  Ulceration at injection sites  Very rarely, a small ulcer will occur at the site where a vein is injected. An ulcer can take 4 to 6 weeks to completely heal. A small scar may result.    Allergic reactions  There is a very rare incidence of an allergic reaction to the solution injected. You will be observed for such reaction and will be treated appropriately should it occur. Please inform us of any allergy history.    Pulmonary embolus/Deep vein thrombosis  This is a blood clot which moves to the lungs/a blood clot in the deep vein system. There is an extraordinarily low incidence of this complication.      SCLEROTHERAPY AFTER CARE  Immediately:  After treatment, walk for 10-15 minutes before getting in your car.  If your trip home is more than 1 hour, stop and walk around for 5-10 minutes. Avoid sitting or standing for extended periods.   First 24 hours: Wear your compression continuously, even while in bed. After the 24 hours, you may shower if you want to. Put your hose back on, unless you are going to bed. You should NOT wear compression to bed after the first 24 hours. You may fly the next day, but wear your compression.   For 5 days: Wear the compression hose for waking hours only. You may continue to wear them longer than 5 days if you prefer.   For days 5-7: Walking is encouraged, as it promotes efficient circulation in your veins. You may do activities that raise your heart rate, but do NOT run, jog, do high impact aerobics, or weight lifting. After 7 days, no activity restrictions.  Shaving: Wait a few days to shave or apply lotion.   Bathing: Do NOT take hot baths or sit in a hot tub for 7-10 days.    For 1 year: Wear SPF 30 sunscreen on your  legs when in the sun. This is very important! It helps prevent darkening of the skin at the injection sites.   Medications: You may resume your usual medications, including aspirin or ibuprofen.    Common Things to Expect       Compression must be worn for the first 24 hours and then during the day for 5-7 days.    If larger veins are treated with ultrasound-guided sclerotherapy, you will have redness, firmness, tenderness, and swelling.  This firmness and tenderness may take 3-6 months to resolve. Ibuprofen and compression hose will aid in this process.    You will have bruising that can last up to 3 weeks. Most fading of the veins will occur between 3 and 6 weeks after treatment.    You may notice brown discoloration (hyperpigmentation) at the treatment site.  This should fade with time, but will take 3 months to 1 year to fully heal.     Some treated veins may look darker because of trapped blood within the vein. This trapped blood can be removed at a minimum of 1 month following treatment. Larger veins are more likely to develop trapped blood.    It is very important for you to use at least SPF 30 sunscreen in order to help prevent the discoloration of your skin.    Migraines rarely occur following sclerotherapy, but are more likely in patients with a history of migraines.  Treat as you would any other migraine.      [employer] last reviewed this educational content on 11/1/2019 2000-2021 [employer]. All rights reserved. This information is not intended as a substitute for professional medical care. Always follow your healthcare professional's instructions.

## 2023-07-19 ENCOUNTER — THERAPY VISIT (OUTPATIENT)
Dept: PHYSICAL THERAPY | Facility: CLINIC | Age: 53
End: 2023-07-19
Payer: COMMERCIAL

## 2023-07-19 DIAGNOSIS — M25.552 BILATERAL HIP PAIN: ICD-10-CM

## 2023-07-19 DIAGNOSIS — G89.29 CHRONIC BILATERAL LOW BACK PAIN WITH LEFT-SIDED SCIATICA: Primary | ICD-10-CM

## 2023-07-19 DIAGNOSIS — M25.551 BILATERAL HIP PAIN: ICD-10-CM

## 2023-07-19 DIAGNOSIS — M54.42 CHRONIC BILATERAL LOW BACK PAIN WITH LEFT-SIDED SCIATICA: Primary | ICD-10-CM

## 2023-07-19 PROCEDURE — 97110 THERAPEUTIC EXERCISES: CPT | Mod: GP | Performed by: PHYSICAL THERAPIST

## 2023-07-19 PROCEDURE — 97140 MANUAL THERAPY 1/> REGIONS: CPT | Mod: GP | Performed by: PHYSICAL THERAPIST

## 2023-07-25 ENCOUNTER — THERAPY VISIT (OUTPATIENT)
Dept: PHYSICAL THERAPY | Facility: CLINIC | Age: 53
End: 2023-07-25
Payer: COMMERCIAL

## 2023-07-25 DIAGNOSIS — M54.42 CHRONIC BILATERAL LOW BACK PAIN WITH LEFT-SIDED SCIATICA: Primary | ICD-10-CM

## 2023-07-25 DIAGNOSIS — M25.552 BILATERAL HIP PAIN: ICD-10-CM

## 2023-07-25 DIAGNOSIS — G89.29 CHRONIC BILATERAL LOW BACK PAIN WITH LEFT-SIDED SCIATICA: Primary | ICD-10-CM

## 2023-07-25 DIAGNOSIS — M25.551 BILATERAL HIP PAIN: ICD-10-CM

## 2023-07-25 PROCEDURE — 97140 MANUAL THERAPY 1/> REGIONS: CPT | Mod: GP | Performed by: PHYSICAL THERAPIST

## 2023-07-25 PROCEDURE — 97530 THERAPEUTIC ACTIVITIES: CPT | Mod: GP | Performed by: PHYSICAL THERAPIST

## 2023-07-25 PROCEDURE — 97110 THERAPEUTIC EXERCISES: CPT | Mod: GP | Performed by: PHYSICAL THERAPIST

## 2023-09-18 ENCOUNTER — OFFICE VISIT (OUTPATIENT)
Dept: VASCULAR SURGERY | Facility: CLINIC | Age: 53
End: 2023-09-18
Payer: COMMERCIAL

## 2023-09-18 DIAGNOSIS — I83.93 SPIDER VEINS OF BOTH LOWER EXTREMITIES: Primary | ICD-10-CM

## 2023-09-18 PROCEDURE — 36468 NJX SCLRSNT SPIDER VEINS: CPT | Performed by: SURGERY

## 2023-09-18 PROCEDURE — S9999 SALES TAX: HCPCS | Performed by: SURGERY

## 2023-09-18 NOTE — LETTER
9/18/2023         RE: Veronica Messina  [address] [facility] [address]        Dear Colleague,    Thank you for referring your patient, Veronica Messina, to the [facility] [place]. Please see a copy of my visit note below.    [facility]: Ladonna Messina has a history of cosmetic spider veins.  Was seen in the clinic to discuss this further on 7/17/2023 and was interested in sclerotherapy.  Risk benefits reviewed again.  Consent signed.    Recent minimally displaced fracture left ankle after a fall.  Wearing cam boot with slow improvement.    Erick Garcia MD     Vein Clinic Sclerotherapy Note     Veronica Messina is a 53 year old female who was seen in clinic today for Sclerotherapy.    Sclerotherapy    Date/Time: 9/18/2023 2:55 PM    Performed by: Erick Garcia MD  Authorized by: Erick Garcia MD    Time out: Immediately prior to the procedure a time out was called    Circulator:  Shana Louis RN     Type:  Cosmetic  Session:  Full  Procedure side:  Bilateral  Solution/Amount:  .5 POLIDOCANOL  Syringes:  8  Patient tolerance:  Patient tolerated the procedure well with no immediate complications      Used 30-gauge needle 1 Syris polarized magnified system.  Did have a larger spider vein in the right proximal lateral calf but primarily on the thighs.  These were treated both anteriorly and posteriorly.  Otherwise, no significant calf or ankle spider veins.    No immediate complications.  Compression was applied to discussed compression protocol.    I felt that I treated all visible spider veins under the polarized magnified system.  Patient will decide if there is any further spider veins or poorly treated areas and would consider follow-up in 4 to 6 weeks.  If she is satisfied with results likely follow-up next year.    Erick Garcia MD    Dictated using Dragon voice recognition software which may result in transcription  errors      Again, thank you for allowing me to participate in the care of your patient.        Sincerely,        Erick Garcia MD

## 2023-09-18 NOTE — PROGRESS NOTES
[facility]: Ladonna Messina has a history of cosmetic spider veins.  Was seen in the clinic to discuss this further on 7/17/2023 and was interested in sclerotherapy.  Risk benefits reviewed again.  Consent signed.    Recent minimally displaced fracture left ankle after a fall.  Wearing cam boot with slow improvement.    Erick Garcia MD     Vein Clinic Sclerotherapy Note     Veronica Messina is a 53 year old female who was seen in clinic today for Sclerotherapy.    Sclerotherapy    Date/Time: 9/18/2023 2:55 PM    Performed by: Erick Garcia MD  Authorized by: Erick Garcia MD    Time out: Immediately prior to the procedure a time out was called    Circulator:  Shana Louis RN     Type:  Cosmetic  Session:  Full  Procedure side:  Bilateral  Solution/Amount:  .5 POLIDOCANOL  Syringes:  8  Patient tolerance:  Patient tolerated the procedure well with no immediate complications      Used 30-gauge needle 1 Syris polarized magnified system.  Did have a larger spider vein in the right proximal lateral calf but primarily on the thighs.  These were treated both anteriorly and posteriorly.  Otherwise, no significant calf or ankle spider veins.    No immediate complications.  Compression was applied to discussed compression protocol.    I felt that I treated all visible spider veins under the polarized magnified system.  Patient will decide if there is any further spider veins or poorly treated areas and would consider follow-up in 4 to 6 weeks.  If she is satisfied with results likely follow-up next year.    Erick Garcia MD    Dictated using Dragon voice recognition software which may result in transcription errors

## 2023-09-25 ENCOUNTER — PATIENT OUTREACH (OUTPATIENT)
Dept: CARE COORDINATION | Facility: CLINIC | Age: 53
End: 2023-09-25
Payer: COMMERCIAL

## 2023-09-28 ENCOUNTER — PATIENT OUTREACH (OUTPATIENT)
Dept: CARE COORDINATION | Facility: CLINIC | Age: 53
End: 2023-09-28
Payer: COMMERCIAL

## 2023-10-09 ENCOUNTER — PATIENT OUTREACH (OUTPATIENT)
Dept: CARE COORDINATION | Facility: CLINIC | Age: 53
End: 2023-10-09
Payer: COMMERCIAL

## 2023-10-11 ENCOUNTER — TRANSCRIBE ORDERS (OUTPATIENT)
Dept: OTHER | Age: 53
End: 2023-10-11

## 2023-10-11 DIAGNOSIS — M25.572 LEFT ANKLE PAIN, UNSPECIFIED CHRONICITY: Primary | ICD-10-CM

## 2023-10-23 ASSESSMENT — ACTIVITIES OF DAILY LIVING (ADL)
ROLLING OVER IN BED: SLIGHT DIFFICULTY
GETTING_INTO_AND_OUT_OF_A_BATHTUB: NO DIFFICULTY AT ALL
WALKING_DOWN_STEEP_HILLS: SLIGHT DIFFICULTY
WALKING_UP_STEEP_HILLS: SLIGHT DIFFICULTY
GOING UP 1 FLIGHT OF STAIRS: NO DIFFICULTY AT ALL
LIGHT_TO_MODERATE_WORK: SLIGHT DIFFICULTY
HOW_WOULD_YOU_RATE_YOUR_CURRENT_LEVEL_OF_FUNCTION_DURING_YOUR_USUAL_ACTIVITIES_OF_DAILY_LIVING_FROM_0_TO_100_WITH_100_BEING_YOUR_LEVEL_OF_FUNCTION_PRIOR_TO_YOUR_HIP_PROBLEM_AND_0_BEING_THE_INABILITY_TO_PERFORM_ANY_OF_YOUR_USUAL_DAILY_ACTIVITIES?: 70
HOS_ADL_SCORE(%): 82.35
WALKING_INITIALLY: SLIGHT DIFFICULTY
HOS_ADL_HIGHEST_POTENTIAL_SCORE: 68
HEAVY_WORK: SLIGHT DIFFICULTY
SITTING FOR 15 MINUTES: SLIGHT DIFFICULTY
PUTTING ON SOCKS AND SHOES: NO DIFFICULTY AT ALL
TWISTING/PIVOTING ON INVOLVED LEG: SLIGHT DIFFICULTY
HOS_ADL_ITEM_SCORE_TOTAL: 56
STEPPING UP AND DOWN CURBS: SLIGHT DIFFICULTY
WALKING_15_MINUTES_OR_GREATER: NO DIFFICULTY AT ALL
GOING DOWN 1 FLIGHT OF STAIRS: NO DIFFICULTY AT ALL
WALKING_APPROXIMATELY_10_MINUTES: NO DIFFICULTY AT ALL
RECREATIONAL ACTIVITIES: SLIGHT DIFFICULTY
STANDING FOR 15 MINUTES: SLIGHT DIFFICULTY
GETTING INTO AND OUT OF AN AVERAGE CAR: NO DIFFICULTY AT ALL
DEEP SQUATTING: MODERATE DIFFICULTY

## 2023-10-26 ENCOUNTER — PATIENT OUTREACH (OUTPATIENT)
Dept: CARE COORDINATION | Facility: CLINIC | Age: 53
End: 2023-10-26
Payer: COMMERCIAL

## 2023-10-30 ENCOUNTER — THERAPY VISIT (OUTPATIENT)
Dept: PHYSICAL THERAPY | Facility: CLINIC | Age: 53
End: 2023-10-30
Attending: ORTHOPAEDIC SURGERY
Payer: COMMERCIAL

## 2023-10-30 DIAGNOSIS — M25.572 LEFT ANKLE PAIN, UNSPECIFIED CHRONICITY: Primary | ICD-10-CM

## 2023-10-30 DIAGNOSIS — M25.572 ACUTE LEFT ANKLE PAIN: ICD-10-CM

## 2023-10-30 PROCEDURE — 97110 THERAPEUTIC EXERCISES: CPT | Mod: GP | Performed by: PHYSICAL THERAPIST

## 2023-10-30 PROCEDURE — 97161 PT EVAL LOW COMPLEX 20 MIN: CPT | Mod: GP | Performed by: PHYSICAL THERAPIST

## 2023-10-30 NOTE — PROGRESS NOTES
PHYSICAL THERAPY EVALUATION  Type of Visit: Evaluation    See electronic medical record for Abuse and Falls Screening details.    Subjective       Presenting condition or subjective complaint: recovery from broken ankle and ongoing bursitis in my hips and legs    Reports sustained left ankle fracture 8/18/2023 ; did not need surgery, was in walking boot x 6 weeks, on crutches x 1 week, then WBAT in boot the remaining 5 weeks.. Supposed to be wearing trilock ankle brace, but states too difficult to get on . Has a compression ankle brace that she has been wearing instead.   Date of onset: 08/18/23    Relevant medical history: Change in skin color   Dates & types of surgery:      Prior diagnostic imaging/testing results: X-ray     Prior therapy history for the same diagnosis, illness or injury: Yes was in therapy in August for my hip when I broke my ankle        Living Environment  Social support: With a significant other or spouse   Type of home: House   Stairs to enter the home: Yes 2 Is there a railing: Yes   Ramp: No   Stairs inside the home: Yes 10 Is there a railing: Yes   Help at home: None  Equipment owned: [employer]     Employment: Yes   Hobbies/Interests: reading, watching tv, exercising, cleaning    Patient goals for therapy: Sit and stand for longer than 10 minutes, being able to sleep with no leg pain    Pain assessment: See objective evaluation for additional pain details     Objective   FOOT/ANKLE EVALUATION  PAIN: Pain Level at Rest: 1/10  Pain Level with Use: 3/10  Pain Location: lateral ankle, lateral malleolar region  Pain Frequency: intermittent  Pain is Exacerbated By: standing, walking, twisting, pivoting  Pain is Relieved By: cold, rest, and elevation  INTEGUMENTARY (edema, incisions):  mild swelling left lateral malleolar region    GAIT:   Weightbearing Status: WBAT  Assistive Device(s): None  Gait Deviations:  turns foot out into eversion; decreased stance time on left,  decreased push off on left. In standings shifts weight to left.   BALANCE/PROPRIOCEPTION: Single Leg Stance Eyes Open (seconds): left x 10 sec with increased compensatino     ROM:   (Degrees) Left AROM Left PROM  Right AROM Right PROM   Ankle Dorsiflexion 5  10    Ankle Plantarflexion 30      Ankle Inversion 15      Ankle Eversion 10, tends to get more motion from rotating hip      Great Toe Flexion       Great Toe Extension       Pain: end range slight pain with inversion    STRENGTH:   Pain: - none + mild ++ moderate +++ severe  Strength Scale: 0-5/5 Left Right   Ankle Dorsiflexion 4 5   Ankle Plantarflexion 4 5   Ankle Inversion 4 5   Ankle Eversion 4 5   Great Toe Flexion     Great Toe Extension     Anterior Tibialis     Posterior Tibialis     Peroneals     Extensor Digitorum     Gastroc/Soleus     No pain with resisted testing left ankle    FUNCTIONAL TESTS:  not tested at this time.  PALPATION:  left lateral malleolar region inferiorly and posteriorly      Assessment & Plan   CLINICAL IMPRESSIONS  Medical Diagnosis: Left ankle pain    Treatment Diagnosis: acute left ankle pain   Impression/Assessment: Patient is a 53 year old female with left ankle complaints.  The following significant findings have been identified: Pain, Decreased ROM/flexibility, Decreased joint mobility, Decreased strength, Impaired balance, Decreased proprioception, Impaired gait, and Decreased activity tolerance. These impairments interfere with their ability to perform self care tasks, work tasks, recreational activities, household chores, driving , household mobility, and community mobility as compared to previous level of function.     Clinical Decision Making (Complexity):  Clinical Presentation: Stable/Uncomplicated  Clinical Presentation Rationale: based on medical and personal factors listed in PT evaluation  Clinical Decision Making (Complexity): Low complexity    PLAN OF CARE  Treatment Interventions:  Modalities:  Cryotherapy  Interventions: Gait Training, Manual Therapy, Neuromuscular Re-education, Therapeutic Activity, Therapeutic Exercise, Self-Care/Home Management    Long Term Goals     PT Goal 1  Goal Identifier: Ambulation  Goal Description: Minutes patient will be able to  walk; on uneven terrain; on sharp inclines/declines; Able to make sharp turns; Ambulate without gait deviation 30 min  Rationale: to maximize safety and independence with performance of ADLs and functional tasks;to maximize safety and independence within the home;to maximize safety and independence within the community;to maximize safety and independence with transportation;to maximize safety and independence with self cares  Target Date: 12/25/23  PT Goal 2  Goal Identifier: Stairs  Goal Description: Ascend/descend stairs; in a normal reciprocal pattern  Rationale: to maximize safety and independence with performance of ADLs and functional tasks;to maximize safety and independence within the home;to maximize safety and independence within the community;to maximize safety and independence with transportation;to maximize safety and independence with self cares  Target Date: 12/25/23      Frequency of Treatment: 1x/week x 8 weeks  Duration of Treatment: 8 weeks      Education Assessment:   Learner/Method: Patient;Pictures/Video;No Barriers to Learning  Education Comments: Educated pt on findings of the evaluation and reasoning for plan of care.  Pt was able to understand how treatment plan aligns with goals.    Risks and benefits of evaluation/treatment have been explained.   Patient/Family/caregiver agrees with Plan of Care.     Evaluation Time:     PT Eval, Low Complexity Minutes (83878): 20       Signing Clinician: Cintia Cerna PT

## 2023-12-04 PROBLEM — M25.572 ACUTE LEFT ANKLE PAIN: Status: RESOLVED | Noted: 2023-10-30 | Resolved: 2023-12-04

## 2023-12-04 PROBLEM — M25.572 LEFT ANKLE PAIN, UNSPECIFIED CHRONICITY: Status: RESOLVED | Noted: 2023-10-30 | Resolved: 2023-12-04

## 2023-12-04 NOTE — PROGRESS NOTES
10/30/23 0500   Appointment Info   Signing clinician's name / credentials Cintia Cerna PT   Total/Authorized Visits eval and treat 8   Visits Used 1   Medical Diagnosis Left ankle pain   PT Tx Diagnosis acute left ankle pain   Other pertinent information ankle fracture 8/18/2023; currently no restrictions, pt WBAT   Progress Note/Certification   Onset of illness/injury or Date of Surgery 08/18/23   Therapy Frequency 1x/week x 8 weeks   Predicted Duration 8 weeks   Progress Note Due Date 12/25/23   Progress Note Completed Date 10/30/23   GOALS   PT Goals 2   PT Goal 1   Goal Identifier Ambulation   Goal Description Minutes patient will be able to  walk; on uneven terrain; on sharp inclines/declines; Able to make sharp turns; Ambulate without gait deviation 30 min   Rationale to maximize safety and independence with performance of ADLs and functional tasks;to maximize safety and independence within the home;to maximize safety and independence within the community;to maximize safety and independence with transportation;to maximize safety and independence with self cares   Target Date 12/25/23   PT Goal 2   Goal Identifier Stairs   Goal Description Ascend/descend stairs; in a normal reciprocal pattern   Rationale to maximize safety and independence with performance of ADLs and functional tasks;to maximize safety and independence within the home;to maximize safety and independence within the community;to maximize safety and independence with transportation;to maximize safety and independence with self cares   Target Date 12/25/23   Subjective Report   Subjective Report see eval   Treatment Interventions (PT)   Interventions Therapeutic Procedure/Exercise   Therapeutic Procedure/Exercise   PTRx Ther Proc 1 Ankle Eversion Strengthening With Theraband   PTRx Ther Proc 1 - Details yellow x 10 vc for isolating motion to ankle not rotating hip   PTRx Ther Proc 2 Theraband Ankle Inversion   PTRx Ther Proc 2 - Details yellow  x 10 vc for isolating motion to ankle not rotating hip   PTRx Ther Proc 3 Theraband Ankle Plantarflexion   PTRx Ther Proc 3 - Details yellow x 10   PTRx Ther Proc 4 Ankle Circles in Supine or Long Sitting   PTRx Ther Proc 4 - Details CW CCW x 5   PTRx Ther Proc 5 Ankle Pumps in Long Sitting   PTRx Ther Proc 5 - Details x5 reports has been doing these at home   PTRx Ther Proc 6 Ankle Alphabet   PTRx Ther Proc 6 - Details 1 time through alphabet   PTRx Ther Proc 7 Towel Stretch Gastroc   PTRx Ther Proc 7 - Details 20 sec hold x 2 with belt   PTRx Ther Proc 8 Standing Gastroc Stretch   PTRx Ther Proc 8 - Details 20 sec x 1 if can do in standing without increased pain then instructed wouldn't have to do seated stretch   Therapeutic Procedures: strength, endurance, ROM, flexibillity minutes (01704) 20   Ther Proc 1 pt brought trilock ankle brace with, attempted to help put brace on, but once started she feels it is too difficult to figure out, just wants something that can slip on easier. Discussed could get basic brace from store or order online if needed.   Eval/Assessments   PT Eval, Low Complexity Minutes (34556) 20   Education   Learner/Method Patient;Pictures/Video;No Barriers to Learning   Education Comments Educated pt on findings of the evaluation and reasoning for plan of care.  Pt was able to understand how treatment plan aligns with goals.   Plan   Home program see P[facility]   Updates to plan of care pt uses [facility] online   Plan for next session continue to progress with ankle ROM, strengthening, balance and proprioception exercises; manual therapy as needed.   Comments   Comments pt also reporting hip and back pain, last seen for this 7/25/2023, then had ankle fracture. would like to resume therapy for hips also, instructed that she will need to set up appt for this separate from ankle   Total Session Time   Timed Code Treatment Minutes 20   Total Treatment Time (sum of timed and untimed services) 40          DISCHARGE  Reason for Discharge: Patient has failed to schedule further appointments.  Pt seen for initial evaluation 10/30/2023 and has not returned.    Equipment Issued: none    Discharge Plan: Patient to continue home program.    Referring Provider:  Will Handley

## 2024-01-20 ENCOUNTER — HEALTH MAINTENANCE LETTER (OUTPATIENT)
Age: 54
End: 2024-01-20

## 2024-02-29 SDOH — HEALTH STABILITY: PHYSICAL HEALTH: ON AVERAGE, HOW MANY DAYS PER WEEK DO YOU ENGAGE IN MODERATE TO STRENUOUS EXERCISE (LIKE A BRISK WALK)?: 4 DAYS

## 2024-02-29 SDOH — HEALTH STABILITY: PHYSICAL HEALTH: ON AVERAGE, HOW MANY MINUTES DO YOU ENGAGE IN EXERCISE AT THIS LEVEL?: 40 MIN

## 2024-02-29 ASSESSMENT — SOCIAL DETERMINANTS OF HEALTH (SDOH): HOW OFTEN DO YOU GET TOGETHER WITH FRIENDS OR RELATIVES?: ONCE A WEEK

## 2024-03-07 ENCOUNTER — OFFICE VISIT (OUTPATIENT)
Dept: PEDIATRICS | Facility: CLINIC | Age: 54
End: 2024-03-07
Payer: COMMERCIAL

## 2024-03-07 VITALS
RESPIRATION RATE: 18 BRPM | DIASTOLIC BLOOD PRESSURE: 73 MMHG | SYSTOLIC BLOOD PRESSURE: 121 MMHG | OXYGEN SATURATION: 99 % | HEIGHT: 66 IN | TEMPERATURE: 97.6 F | BODY MASS INDEX: 34.52 KG/M2 | HEART RATE: 66 BPM | WEIGHT: 214.8 LBS

## 2024-03-07 DIAGNOSIS — M25.552 BILATERAL HIP PAIN: ICD-10-CM

## 2024-03-07 DIAGNOSIS — N95.1 VASOMOTOR SYMPTOMS DUE TO MENOPAUSE: ICD-10-CM

## 2024-03-07 DIAGNOSIS — Z00.00 ROUTINE GENERAL MEDICAL EXAMINATION AT A HEALTH CARE FACILITY: Primary | ICD-10-CM

## 2024-03-07 DIAGNOSIS — M25.551 BILATERAL HIP PAIN: ICD-10-CM

## 2024-03-07 DIAGNOSIS — M54.42 CHRONIC BILATERAL LOW BACK PAIN WITH LEFT-SIDED SCIATICA: ICD-10-CM

## 2024-03-07 DIAGNOSIS — G89.29 CHRONIC BILATERAL LOW BACK PAIN WITH LEFT-SIDED SCIATICA: ICD-10-CM

## 2024-03-07 DIAGNOSIS — F43.23 ADJUSTMENT DISORDER WITH MIXED ANXIETY AND DEPRESSED MOOD: ICD-10-CM

## 2024-03-07 LAB
BASOPHILS # BLD AUTO: 0 10E3/UL (ref 0–0.2)
BASOPHILS NFR BLD AUTO: 0 %
EOSINOPHIL # BLD AUTO: 0.1 10E3/UL (ref 0–0.7)
EOSINOPHIL NFR BLD AUTO: 2 %
ERYTHROCYTE [DISTWIDTH] IN BLOOD BY AUTOMATED COUNT: 13.2 % (ref 10–15)
HCT VFR BLD AUTO: 39.3 % (ref 35–47)
HGB BLD-MCNC: 12.4 G/DL (ref 11.7–15.7)
IMM GRANULOCYTES # BLD: 0 10E3/UL
IMM GRANULOCYTES NFR BLD: 0 %
LYMPHOCYTES # BLD AUTO: 2 10E3/UL (ref 0.8–5.3)
LYMPHOCYTES NFR BLD AUTO: 29 %
MCH RBC QN AUTO: 29.2 PG (ref 26.5–33)
MCHC RBC AUTO-ENTMCNC: 31.6 G/DL (ref 31.5–36.5)
MCV RBC AUTO: 93 FL (ref 78–100)
MONOCYTES # BLD AUTO: 0.4 10E3/UL (ref 0–1.3)
MONOCYTES NFR BLD AUTO: 6 %
NEUTROPHILS # BLD AUTO: 4.4 10E3/UL (ref 1.6–8.3)
NEUTROPHILS NFR BLD AUTO: 63 %
PLATELET # BLD AUTO: 236 10E3/UL (ref 150–450)
RBC # BLD AUTO: 4.24 10E6/UL (ref 3.8–5.2)
WBC # BLD AUTO: 7 10E3/UL (ref 4–11)

## 2024-03-07 PROCEDURE — 90471 IMMUNIZATION ADMIN: CPT | Performed by: NURSE PRACTITIONER

## 2024-03-07 PROCEDURE — 80053 COMPREHEN METABOLIC PANEL: CPT | Performed by: NURSE PRACTITIONER

## 2024-03-07 PROCEDURE — 85025 COMPLETE CBC W/AUTO DIFF WBC: CPT | Performed by: NURSE PRACTITIONER

## 2024-03-07 PROCEDURE — 84443 ASSAY THYROID STIM HORMONE: CPT | Performed by: NURSE PRACTITIONER

## 2024-03-07 PROCEDURE — 82306 VITAMIN D 25 HYDROXY: CPT | Performed by: NURSE PRACTITIONER

## 2024-03-07 PROCEDURE — 90715 TDAP VACCINE 7 YRS/> IM: CPT | Performed by: NURSE PRACTITIONER

## 2024-03-07 PROCEDURE — 90472 IMMUNIZATION ADMIN EACH ADD: CPT | Performed by: NURSE PRACTITIONER

## 2024-03-07 PROCEDURE — 99396 PREV VISIT EST AGE 40-64: CPT | Mod: 25 | Performed by: NURSE PRACTITIONER

## 2024-03-07 PROCEDURE — 90750 HZV VACC RECOMBINANT IM: CPT | Performed by: NURSE PRACTITIONER

## 2024-03-07 PROCEDURE — 36415 COLL VENOUS BLD VENIPUNCTURE: CPT | Performed by: NURSE PRACTITIONER

## 2024-03-07 PROCEDURE — 90746 HEPB VACCINE 3 DOSE ADULT IM: CPT | Performed by: NURSE PRACTITIONER

## 2024-03-07 PROCEDURE — 99214 OFFICE O/P EST MOD 30 MIN: CPT | Mod: 25 | Performed by: NURSE PRACTITIONER

## 2024-03-07 ASSESSMENT — PAIN SCALES - GENERAL: PAINLEVEL: MODERATE PAIN (5)

## 2024-03-07 NOTE — Clinical Note
Just thought you might want to know that your kofi patient's   of pancreatic cancer last November. She's struggling, but overall doing ok (seeing therapist, etc). She may follow-up with you in a month for the prozac and phentermine we started today. Thank you!! loree

## 2024-03-07 NOTE — PROGRESS NOTES
"Preventive Care Visit  [facility] [place] PEDRO Gunter, JACK MAJANO, Internal Medicine - Pediatrics  Mar 7, 2024    Assessment & Plan     Routine general medical examination at a health care facility      Vasomotor symptoms due to menopause  These symptoms have resolved, she stopped effexor and will stay off it for now.     Bilateral hip pain  Ongoing issue, could be related to low back pain and/or L lknee pain. She will go back to PT at some point, could consider referral to ortho if PT not effective    Chronic bilateral low back pain with left-sided sciatica      Adjustment disorder with mixed anxiety and depressed mood  Worsened due to mourning the loss of her  and has daughter with her own mental health struggles. She is seeing therapist regularly, will add prozac today, med side effects, risks reviewed. Will follow-up in 1 month.   - FLUoxetine (PROZAC) 20 MG capsule; Take 1 capsule (20 mg) by mouth daily    BMI 34.0-34.9,adult  She is frustrated by her weight gain and how she looks. We discussed diet, exercise and medications. Will try phentermine and discussed med se, risks today. Expect to increase dose in 1 month, instructed to do an evisit at that time.   - Comprehensive metabolic panel (BMP + Alb, Alk Phos, ALT, AST, Total. Bili, TP); Future  - CBC with platelets and differential; Future  - TSH with free T4 reflex; Future  - Vitamin D Deficiency; Future  - Comprehensive metabolic panel (BMP + Alb, Alk Phos, ALT, AST, Total. Bili, TP)  - CBC with platelets and differential  - TSH with free T4 reflex  - Vitamin D Deficiency  - phentermine 15 MG capsule; Take 1 capsule (15 mg) by mouth every morning              BMI  Estimated body mass index is 34.32 kg/m  as calculated from the following:    Height as of this encounter: 1.685 m (5' 6.34\").    Weight as of this encounter: 97.4 kg (214 lb 12.8 oz).       Counseling  Appropriate preventive services were discussed with this patient, " including applicable screening as appropriate for fall prevention, nutrition, physical activity, Tobacco-use cessation, weight loss and cognition.  Checklist reviewing preventive services available has been given to the patient.          Marva Martin is a 54 year old, presenting for the following:  Physical        3/7/2024     2:15 PM   Additional Questions   Roomed by Ligia   Accompanied by N/A         3/7/2024     2:15 PM   Patient Reported Additional Medications   Patient reports taking the following new medications No        Health Care Directive  Patient does not have a Health Care Directive or Living Will: Patient states has Advance Directive and will bring in a copy to clinic.    HPI  Hip pain and lower back pain     History of hot flashes, was on effexor, not any more, notes was also having anxiety, seeing [facility]. Notes   of cancer last nov. She notes hot flashes have mostly subsided but has significant stressors with teen daughter and her mental health.     She has significant hip pain, was seeing PT, hasn't been able to do her exercises. Pain accompanied by lower back pain. Stretches and massage helps. Doesn't take OTC NSAIds.     Lost 25# through going to the gym 5d/w, cut things in diet.     Wt Readings from Last 4 Encounters:   24 97.4 kg (214 lb 12.8 oz)   23 99.7 kg (219 lb 14.4 oz)   10/25/22 103.9 kg (229 lb)   10/24/22 104 kg (229 lb 4.8 oz)           2024   General Health   How would you rate your overall physical health? (!) FAIR   Feel stress (tense, anxious, or unable to sleep) To some extent   (!) STRESS CONCERN      2024   Nutrition   Three or more servings of calcium each day? Yes   Diet: Regular (no restrictions)    I don't know   How many servings of fruit and vegetables per day? (!) 2-3   How many sweetened beverages each day? (!) 2         2024   Exercise   Days per week of moderate/strenous exercise 4 days   Average minutes spent exercising  at this level 40 min         2/29/2024   Social Factors   Frequency of gathering with friends or relatives Once a week   Worry food won't last until get money to buy more No   Food not last or not have enough money for food? No   Do you have housing?  Yes   Are you worried about losing your housing? No   Lack of transportation? No   Unable to get utilities (heat,electricity)? No         2/29/2024   Fall Risk   Fallen 2 or more times in the past year? No   Trouble with walking or balance? No          2/29/2024   Dental   Dentist two times every year? Yes         2/29/2024   TB Screening   Were you born outside of [place]?  No         Today's PHQ-2 Score:       3/7/2024     2:18 PM   PHQ-2 ( 1999 [employer])   Q1: Little interest or pleasure in doing things 0   Q2: Feeling down, depressed or hopeless 1   PHQ-2 Score 1           2/29/2024   Substance Use   Alcohol more than 3/day or more than 7/wk No   Do you use any other substances recreationally? No     Social History     Tobacco Use    Smoking status: Never     Passive exposure: Never    Smokeless tobacco: Never   Vaping Use    Vaping Use: Never used   Substance Use Topics    Alcohol use: No     Comment: occ    Drug use: No             2/29/2024   Breast Cancer Screening   Family history of breast, colon, or ovarian cancer? No / Unknown         10/28/2022   LAST FHS-7 RESULTS   1st degree relative breast or ovarian cancer No   Any relative bilateral breast cancer No   Any male have breast cancer No   Any ONE woman have BOTH breast AND ovarian cancer No   Any woman with breast cancer before 50yrs No   2 or more relatives with breast AND/OR ovarian cancer No   2 or more relatives with breast AND/OR bowel cancer No        Mammogram Screening - Mammogram every 1-2 years updated in Health Maintenance based on mutual decision making        2/29/2024   STI Screening   New sexual partner(s) since last STI/HIV test? No     History of abnormal Pap smear: NO - age 30-65 PAP every 5  "years with negative HPV co-testing recommended        Latest Ref Rng & Units 10/24/2022     9:32 AM 7/25/2016    12:00 AM 6/27/2014    12:00 AM   PAP / HPV   PAP  Negative for Intraepithelial Lesion or Malignancy (NILM)      HPV 16 DNA Negative Negative      HPV 18 DNA Negative Negative      Other HR HPV Negative Negative      PAP-ABSTRACT   See Scanned Document     See Scanned Document           This result is from an external source.     ASCVD Risk   The 10-year ASCVD risk score (Angel FLORES, et al., 2019) is: 1.1%    Values used to calculate the score:      Age: 54 years      Sex: Female      Is Non- : No      Diabetic: No      Tobacco smoker: No      Systolic Blood Pressure: 121 mmHg      Is BP treated: No      HDL Cholesterol: 64 mg/dL      Total Cholesterol: 182 mg/dL           Reviewed and updated as needed this visit by Provider     Meds                         Objective    Exam  /73 (BP Location: Right arm, Patient Position: Sitting, Cuff Size: Adult Regular)   Pulse 66   Temp 97.6  F (36.4  C) (Tympanic)   Resp 18   Ht 1.685 m (5' 6.34\")   Wt 97.4 kg (214 lb 12.8 oz)   LMP 03/15/2019 (Approximate)   SpO2 99%   BMI 34.32 kg/m     Estimated body mass index is 34.32 kg/m  as calculated from the following:    Height as of this encounter: 1.685 m (5' 6.34\").    Weight as of this encounter: 97.4 kg (214 lb 12.8 oz).    Physical Exam  GENERAL: alert and no distress  EYES: Eyes grossly normal to inspection, PERRL and conjunctivae and sclerae normal  HENT: ear canals and TM's normal, nose and mouth without ulcers or lesions  NECK: no adenopathy, no asymmetry, masses, or scars  RESP: lungs clear to auscultation - no rales, rhonchi or wheezes  CV: regular rate and rhythm, normal S1 S2, no S3 or S4, no murmur, click or rub, no peripheral edema  MS: no gross musculoskeletal defects noted, no edema      Signed Electronically by: JACK Last CNP    "

## 2024-03-07 NOTE — PATIENT INSTRUCTIONS
Preventive Care Advice   This is general advice given by our system to help you stay healthy. However, your care team may have specific advice just for you. Please talk to your care team about your preventive care needs.  Nutrition  Eat 5 or more servings of fruits and vegetables each day.  Try wheat bread, brown rice and whole grain pasta (instead of white bread, rice, and pasta).  Get enough calcium and vitamin D. Check the label on foods and aim for 100% of the RDA (recommended daily allowance).  Lifestyle  Exercise at least 150 minutes each week   (30 minutes a day, 5 days a week).  Do muscle strengthening activities 2 days a week. These help control your weight and prevent disease.  No smoking.  Wear sunscreen to prevent skin cancer.  Have a dental exam and cleaning every 6 months.  Yearly exams  See your health care team every year to talk about:  Any changes in your health.  Any medicines your care team has prescribed.  Preventive care, family planning, and ways to prevent chronic diseases.  Shots (vaccines)   HPV shots (up to age 26), if you've never had them before.  Hepatitis B shots (up to age 59), if you've never had them before.  COVID-19 shot: Get this shot when it's due.  Flu shot: Get a flu shot every year.  Tetanus shot: Get a tetanus shot every 10 years.  Pneumococcal, hepatitis A, and RSV shots: Ask your care team if you need these based on your risk.  Shingles shot (for age 50 and up).  General health tests  Diabetes screening:  Starting at age 35, Get screened for diabetes at least every 3 years.  If you are younger than age 35, ask your care team if you should be screened for diabetes.  Cholesterol test: At age 39, start having a cholesterol test every 5 years, or more often if advised.  Bone density scan (DEXA): At age 50, ask your care team if you should have this scan for osteoporosis (brittle bones).  Hepatitis C: Get tested at least once in your life.  STIs (sexually transmitted  infections)  Before age 24: Ask your care team if you should be screened for STIs.  After age 24: Get screened for STIs if you're at risk. You are at risk for STIs (including HIV) if:  You are sexually active with more than one person.  You don't use condoms every time.  You or a partner was diagnosed with a sexually transmitted infection.  If you are at risk for HIV, ask about PrEP medicine to prevent HIV.  Get tested for HIV at least once in your life, whether you are at risk for HIV or not.  Cancer screening tests  Cervical cancer screening: If you have a cervix, begin getting regular cervical cancer screening tests at age 21. Most people who have regular screenings with normal results can stop after age 65. Talk about this with your provider.  Breast cancer scan (mammogram): If you've ever had breasts, begin having regular mammograms starting at age 40. This is a scan to check for breast cancer.  Colon cancer screening: It is important to start screening for colon cancer at age 45.  Have a colonoscopy test every 10 years (or more often if you're at risk) Or, ask your provider about stool tests like a FIT test every year or Cologuard test every 3 years.  To learn more about your testing options, visit: https://www.Swarm/740923.pdf.  For help making a decision, visit: https://bit.ly/tk63459.  Prostate cancer screening test: If you have a prostate and are age 55 to 69, ask your provider if you would benefit from a yearly prostate cancer screening test.  Lung cancer screening: If you are a current or former smoker age 50 to 80, ask your care team if ongoing lung cancer screenings are right for you.  For informational purposes only. Not to replace the advice of your health care provider. Copyright   2023 [employer]. All rights reserved. Clinically reviewed by the [facility]. [employer] 910685 - REV 01/24.    Learning About Stress  What is stress?     Stress is your  body's response to a hard situation. Your body can have a physical, emotional, or mental response. Stress is a fact of life for most people, and it affects everyone differently. What causes stress for you may not be stressful for someone else.  A lot of things can cause stress. You may feel stress when you go on a job interview, take a test, or run a race. This kind of short-term stress is normal and even useful. It can help you if you need to work hard or react quickly. For example, stress can help you finish an important job on time.  Long-term stress is caused by ongoing stressful situations or events. Examples of long-term stress include long-term health problems, ongoing problems at work, or conflicts in your family. Long-term stress can harm your health.  How does stress affect your health?  When you are stressed, your body responds as though you are in danger. It makes hormones that speed up your heart, make you breathe faster, and give you a burst of energy. This is called the fight-or-flight stress response. If the stress is over quickly, your body goes back to normal and no harm is done.  But if stress happens too often or lasts too long, it can have bad effects. Long-term stress can make you more likely to get sick, and it can make symptoms of some diseases worse. If you tense up when you are stressed, you may develop neck, shoulder, or low back pain. Stress is linked to high blood pressure and heart disease.  Stress also harms your emotional health. It can make you montes de oca, tense, or depressed. Your relationships may suffer, and you may not do well at work or school.  What can you do to manage stress?  You can try these things to help manage stress:   Do something active. Exercise or activity can help reduce stress. Walking is a great way to get started. Even everyday activities such as housecleaning or yard work can help.  Try yoga or tamar chi. These techniques combine exercise and meditation. You may need  some training at first to learn them.  Do something you enjoy. For example, listen to music or go to a movie. Practice your hobby or do volunteer work.  Meditate. This can help you relax, because you are not worrying about what happened before or what may happen in the future.  Do guided imagery. Imagine yourself in any setting that helps you feel calm. You can use online videos, books, or a teacher to guide you.  Do breathing exercises. For example:  From a standing position, bend forward from the waist with your knees slightly bent. Let your arms dangle close to the floor.  Breathe in slowly and deeply as you return to a standing position. Roll up slowly and lift your head last.  Hold your breath for just a few seconds in the standing position.  Breathe out slowly and bend forward from the waist.  Let your feelings out. Talk, laugh, cry, and express anger when you need to. Talking with supportive friends or family, a counselor, or a libia leader about your feelings is a healthy way to relieve stress. Avoid discussing your feelings with people who make you feel worse.  Write. It may help to write about things that are bothering you. This helps you find out how much stress you feel and what is causing it. When you know this, you can find better ways to cope.  What can you do to prevent stress?  You might try some of these things to help prevent stress:  Manage your time. This helps you find time to do the things you want and need to do.  Get enough sleep. Your body recovers from the stresses of the day while you are sleeping.  Get support. Your family, friends, and community can make a difference in how you experience stress.  Limit your news feed. Avoid or limit time on social media or news that may make you feel stressed.  Do something active. Exercise or activity can help reduce stress. Walking is a great way to get started.  Where can you learn more?  Go to https://www.healthwise.net/patiented  Enter N032 in the  "search box to learn more about \"Learning About Stress.\"  Current as of: February 26, 2023               Content Version: 13.8    6757-0888 [employer].   Care instructions adapted under license by your healthcare professional. If you have questions about a medical condition or this instruction, always ask your healthcare professional. [employer] disclaims any warranty or liability for your use of this information.      "

## 2024-03-08 ENCOUNTER — TELEPHONE (OUTPATIENT)
Dept: PEDIATRICS | Facility: CLINIC | Age: 54
End: 2024-03-08
Payer: COMMERCIAL

## 2024-03-08 LAB
ALBUMIN SERPL BCG-MCNC: 4.4 G/DL (ref 3.5–5.2)
ALP SERPL-CCNC: 69 U/L (ref 40–150)
ALT SERPL W P-5'-P-CCNC: 9 U/L (ref 0–50)
ANION GAP SERPL CALCULATED.3IONS-SCNC: 8 MMOL/L (ref 7–15)
AST SERPL W P-5'-P-CCNC: 18 U/L (ref 0–45)
BILIRUB SERPL-MCNC: 0.2 MG/DL
BUN SERPL-MCNC: 16.8 MG/DL (ref 6–20)
CALCIUM SERPL-MCNC: 9.4 MG/DL (ref 8.6–10)
CHLORIDE SERPL-SCNC: 104 MMOL/L (ref 98–107)
CREAT SERPL-MCNC: 0.66 MG/DL (ref 0.51–0.95)
DEPRECATED HCO3 PLAS-SCNC: 28 MMOL/L (ref 22–29)
EGFRCR SERPLBLD CKD-EPI 2021: >90 ML/MIN/1.73M2
GLUCOSE SERPL-MCNC: 89 MG/DL (ref 70–99)
POTASSIUM SERPL-SCNC: 4.3 MMOL/L (ref 3.4–5.3)
PROT SERPL-MCNC: 7.3 G/DL (ref 6.4–8.3)
SODIUM SERPL-SCNC: 140 MMOL/L (ref 135–145)
TSH SERPL DL<=0.005 MIU/L-ACNC: 0.97 UIU/ML (ref 0.3–4.2)
VIT D+METAB SERPL-MCNC: 19 NG/ML (ref 20–50)

## 2024-03-08 RX ORDER — PHENTERMINE HYDROCHLORIDE 15 MG/1
15 CAPSULE ORAL EVERY MORNING
Qty: 30 CAPSULE | Refills: 0 | Status: SHIPPED | OUTPATIENT
Start: 2024-03-08 | End: 2024-03-08

## 2024-03-08 RX ORDER — PHENTERMINE HYDROCHLORIDE 15 MG/1
15 CAPSULE ORAL EVERY MORNING
Qty: 30 CAPSULE | Refills: 0 | Status: SHIPPED | OUTPATIENT
Start: 2024-03-08 | End: 2024-04-08

## 2024-03-08 NOTE — TELEPHONE ENCOUNTER
Outgoing call spoke to [facility] pharmacy.  They did not receive script for Phertermine but patient picked up the other script for Prozac.    Thank you  Dev ARNDT

## 2024-03-08 NOTE — TELEPHONE ENCOUNTER
PRIOR AUTHORIZATION DENIED    Medication: PHENTERMINE HCL 15 MG PO CAPS  Insurance Company: [employer] - Phone [phone] Fax [phone]  Denial Date: 3/8/2024  Denial Reason(s):     Appeal Information:     Patient Notified: No

## 2024-03-08 NOTE — TELEPHONE ENCOUNTER
Insurance does not cover Phentermine. Pt willing to pay out of pocket using GoodRx but is requesting the prescription be resent to:    [id number] - [place] - [address] AT [facility] [address]  [address] [address]  Phone: [phone] Fax: [phone]    Tigist Messer RN on 3/8/2024 at 2:36 PM

## 2024-03-11 NOTE — TELEPHONE ENCOUNTER
Nancy Mayorga Ea/Rm3 days ago       PA DENIED  Please close encounter when finished.  If provider would like to appeal we will need a detailed letter of medical necessity to start the process.  Thank you  Central PA Team

## 2024-03-14 NOTE — TELEPHONE ENCOUNTER
Prior Authorization Approval    Medication: PHENTERMINE HCL 15 MG PO CAPS  Authorization Effective Date: 3/14/2024  Authorization Expiration Date: 6/14/2024  Approved Dose/Quantity:   Reference #:     Insurance Company: [employer] - Phone [phone] Fax [phone]  Expected CoPay: $    CoPay Card Available:      Financial Assistance Needed:   Which Pharmacy is filling the prescription: [id number] - [place], [place] - [address] AT [address]

## 2024-04-04 ENCOUNTER — E-VISIT (OUTPATIENT)
Dept: PEDIATRICS | Facility: CLINIC | Age: 54
End: 2024-04-04
Payer: COMMERCIAL

## 2024-04-04 ENCOUNTER — MYC REFILL (OUTPATIENT)
Dept: PEDIATRICS | Facility: CLINIC | Age: 54
End: 2024-04-04

## 2024-04-04 DIAGNOSIS — E66.09 CLASS 1 OBESITY DUE TO EXCESS CALORIES WITHOUT SERIOUS COMORBIDITY WITH BODY MASS INDEX (BMI) OF 34.0 TO 34.9 IN ADULT: Primary | ICD-10-CM

## 2024-04-04 DIAGNOSIS — E66.811 CLASS 1 OBESITY DUE TO EXCESS CALORIES WITHOUT SERIOUS COMORBIDITY WITH BODY MASS INDEX (BMI) OF 34.0 TO 34.9 IN ADULT: Primary | ICD-10-CM

## 2024-04-04 DIAGNOSIS — F43.23 ADJUSTMENT DISORDER WITH MIXED ANXIETY AND DEPRESSED MOOD: ICD-10-CM

## 2024-04-04 PROCEDURE — 99422 OL DIG E/M SVC 11-20 MIN: CPT | Performed by: NURSE PRACTITIONER

## 2024-04-04 RX ORDER — PHENTERMINE HYDROCHLORIDE 15 MG/1
15 CAPSULE ORAL EVERY MORNING
Qty: 30 CAPSULE | Refills: 0 | Status: CANCELLED | OUTPATIENT
Start: 2024-04-04

## 2024-04-04 NOTE — TELEPHONE ENCOUNTER
"\"Bertha Lyles, JACK CNP   to Ea Triage   HB    4/4/24 11:29 AM  Per OV 3/7:    BMI 34.0-34.9,adult  She is frustrated by her weight gain and how she looks. We discussed diet, exercise and medications. Will try phentermine and discussed med se, risks today. Expect to increase dose in 1 month, instructed to do an evisit at that time.    Recommend e-visit to discuss dosing/progress.\"    MC sent to patient informing.     Melba MORALES RN on 4/4/2024 at 11:32 AM     "

## 2024-04-08 RX ORDER — PHENTERMINE HYDROCHLORIDE 30 MG/1
30 CAPSULE ORAL EVERY MORNING
Qty: 30 CAPSULE | Refills: 0 | Status: SHIPPED | OUTPATIENT
Start: 2024-04-08 | End: 2024-05-09

## 2024-04-08 NOTE — PATIENT INSTRUCTIONS
Thank you for choosing us for your care. I have placed an order for a prescription so that you can start treatment. View your full visit summary for details by clicking on the link below. Your pharmacist will able to address any questions you may have about the medication.     If you're not feeling better within 5-7 days, please schedule an appointment.  You can schedule an appointment right here in [facility], or call [phone]  If the visit is for the same symptoms as your eVisit, we'll refund the cost of your eVisit if seen within seven days.    Thank you for choosing us for your care. I think an in-clinic visit would be best next steps based on your symptoms. Please schedule a clinic appointment; you won t be charged for this eVisit.      You can schedule an appointment right here in [facility], or call [phone]

## 2024-04-10 ENCOUNTER — ALLIED HEALTH/NURSE VISIT (OUTPATIENT)
Dept: PEDIATRICS | Facility: CLINIC | Age: 54
End: 2024-04-10
Payer: COMMERCIAL

## 2024-04-10 VITALS — HEART RATE: 76 BPM | DIASTOLIC BLOOD PRESSURE: 81 MMHG | SYSTOLIC BLOOD PRESSURE: 119 MMHG

## 2024-04-10 DIAGNOSIS — Z01.30 BLOOD PRESSURE CHECK: Primary | ICD-10-CM

## 2024-04-10 PROCEDURE — 99207 PR NO CHARGE NURSE ONLY: CPT

## 2024-05-06 ENCOUNTER — ALLIED HEALTH/NURSE VISIT (OUTPATIENT)
Dept: PEDIATRICS | Facility: CLINIC | Age: 54
End: 2024-05-06
Payer: COMMERCIAL

## 2024-05-06 VITALS — DIASTOLIC BLOOD PRESSURE: 76 MMHG | HEART RATE: 74 BPM | SYSTOLIC BLOOD PRESSURE: 126 MMHG

## 2024-05-06 DIAGNOSIS — Z01.30 BP CHECK: Primary | ICD-10-CM

## 2024-05-06 PROCEDURE — 99207 PR NO CHARGE NURSE ONLY: CPT

## 2024-05-06 NOTE — PROGRESS NOTES
Veronica Messina is a 54 year old patient who comes in today for a Blood Pressure check.  Initial BP:  /76 (BP Location: Right arm, Patient Position: Sitting, Cuff Size: Adult Large)   Pulse 74   LMP 03/15/2019 (Approximate)      74  Disposition: results routed to provider

## 2024-05-08 ENCOUNTER — OFFICE VISIT (OUTPATIENT)
Dept: PEDIATRICS | Facility: CLINIC | Age: 54
End: 2024-05-08
Payer: COMMERCIAL

## 2024-05-08 VITALS
RESPIRATION RATE: 20 BRPM | WEIGHT: 216.56 LBS | OXYGEN SATURATION: 97 % | TEMPERATURE: 98.3 F | BODY MASS INDEX: 36.08 KG/M2 | HEART RATE: 73 BPM | DIASTOLIC BLOOD PRESSURE: 79 MMHG | HEIGHT: 65 IN | SYSTOLIC BLOOD PRESSURE: 118 MMHG

## 2024-05-08 DIAGNOSIS — G89.29 CHRONIC RIGHT-SIDED LOW BACK PAIN WITH RIGHT-SIDED SCIATICA: Primary | ICD-10-CM

## 2024-05-08 DIAGNOSIS — M54.42 CHRONIC BILATERAL LOW BACK PAIN WITH LEFT-SIDED SCIATICA: ICD-10-CM

## 2024-05-08 DIAGNOSIS — M25.551 BILATERAL HIP PAIN: ICD-10-CM

## 2024-05-08 DIAGNOSIS — M25.552 BILATERAL HIP PAIN: ICD-10-CM

## 2024-05-08 DIAGNOSIS — M54.41 CHRONIC RIGHT-SIDED LOW BACK PAIN WITH RIGHT-SIDED SCIATICA: Primary | ICD-10-CM

## 2024-05-08 DIAGNOSIS — G89.29 CHRONIC BILATERAL LOW BACK PAIN WITH LEFT-SIDED SCIATICA: ICD-10-CM

## 2024-05-08 PROBLEM — R73.01 ELEVATED FASTING BLOOD SUGAR: Status: RESOLVED | Noted: 2022-10-25 | Resolved: 2024-05-08

## 2024-05-08 PROCEDURE — 99213 OFFICE O/P EST LOW 20 MIN: CPT | Performed by: PHYSICIAN ASSISTANT

## 2024-05-08 RX ORDER — PREDNISONE 20 MG/1
40 TABLET ORAL DAILY
Qty: 10 TABLET | Refills: 0 | Status: SHIPPED | OUTPATIENT
Start: 2024-05-08 | End: 2024-07-29

## 2024-05-08 ASSESSMENT — PAIN SCALES - GENERAL: PAINLEVEL: EXTREME PAIN (8)

## 2024-05-08 ASSESSMENT — ENCOUNTER SYMPTOMS: BACK PAIN: 1

## 2024-05-08 NOTE — PROGRESS NOTES
"  Assessment & Plan     Chronic right-sided low back pain with right-sided sciatica    - predniSONE (DELTASONE) 20 MG tablet; Take 2 tablets (40 mg) by mouth daily  - Spine  Referral; Future    Chronic bilateral low back pain with left-sided sciatica    - predniSONE (DELTASONE) 20 MG tablet; Take 2 tablets (40 mg) by mouth daily  - Spine  Referral; Future    Bilateral hip pain      Patient was seen today for acute on chronic low back pain.  She reports that she has a history of back pain, but the pain today has been worse and more persistent.  She is not having any loss of bladder or bowel control and she denies any numbness or tingling into the groin or buttock.  Patient will be treated with Prednisone for the pain, which she has had in the past.  Side effects were discussed as well.  She was encouraged to continue with physical therapy and due to chronic pain without any imaging, patient was also advised to followup with orthopedic specialty.  Referral was placed today.  Patient to followup if symptoms do not improve as expected.  She should be seen sooner if symptoms change or worsen in any way.  Patient understands and agrees with the plan today.        BMI  Estimated body mass index is 35.65 kg/m  as calculated from the following:    Height as of this encounter: 1.66 m (5' 5.35\").    Weight as of this encounter: 98.2 kg (216 lb 9 oz).       Marva Martin is a 54 year old, presenting for the following health issues:  Back Pain (Spasms)        5/8/2024    12:52 PM   Additional Questions   Roomed by Genoveva Wall CMA   Accompanied by N/A         5/8/2024    12:52 PM   Patient Reported Additional Medications   Patient reports taking the following new medications N/A     History of Present Illness       Back Pain:  She presents for follow up of back pain. Patient's back pain is a chronic problem.  Location of back pain:  Right lower back, left lower back and right side of waist  Description " "of back pain: sharp, shooting and stabbing  Back pain spreads: right buttocks, left buttocks, right thigh, left thigh, right knee and left knee    Since patient first noticed back pain, pain is: rapidly worsening  Does back pain interfere with her job:  Yes       She eats 4 or more servings of fruits and vegetables daily.She consumes 1 sweetened beverage(s) daily.She exercises with enough effort to increase her heart rate 30 to 60 minutes per day.  She exercises with enough effort to increase her heart rate 4 days per week.   She is taking medications regularly.       Patient is here today with acute on chronic back pain.  She reports that she has a history of back pain and she does get occasional flare-ups, but this has been more persistent.  She is having a hard time with position changes or going from sitting to standing or laying.  She is not having any fevers or chills and she denies any loss of bladder or bowel control.  She is not having any numbness or tingling.         Review of Systems  Constitutional, neuro, ENT, endocrine, pulmonary, cardiac, gastrointestinal, genitourinary, musculoskeletal, integument and psychiatric systems are negative, except as otherwise noted.      Objective    /79 (BP Location: Right arm, Patient Position: Sitting, Cuff Size: Adult Regular)   Pulse 73   Temp 98.3  F (36.8  C) (Temporal)   Resp 20   Ht 1.66 m (5' 5.35\")   Wt 98.2 kg (216 lb 9 oz)   LMP 03/15/2019 (Approximate)   SpO2 97%   BMI 35.65 kg/m    Body mass index is 35.65 kg/m .  Physical Exam   GENERAL: alert and no distress  EYES: Eyes grossly normal to inspection, PERRL and conjunctivae and sclerae normal  NECK: no adenopathy, no asymmetry, masses, or scars  MS: no gross musculoskeletal defects noted, no edema  ORTHO: Hip Exam: Palpation: Tender:   No tenderness  Range of Motion:  Full ROM, both hips  Strength:  full strength  SKIN: no suspicious lesions or rashes  NEURO: Normal strength and tone, " mentation intact and speech normal  BACK: no CVA tenderness, no paralumbar tenderness          Signed Electronically by: Shana Lockhart PA-C

## 2024-05-09 ENCOUNTER — MYC MEDICAL ADVICE (OUTPATIENT)
Dept: PEDIATRICS | Facility: CLINIC | Age: 54
End: 2024-05-09
Payer: COMMERCIAL

## 2024-05-09 DIAGNOSIS — E66.09 CLASS 1 OBESITY DUE TO EXCESS CALORIES WITHOUT SERIOUS COMORBIDITY WITH BODY MASS INDEX (BMI) OF 34.0 TO 34.9 IN ADULT: ICD-10-CM

## 2024-05-09 DIAGNOSIS — E66.811 CLASS 1 OBESITY DUE TO EXCESS CALORIES WITHOUT SERIOUS COMORBIDITY WITH BODY MASS INDEX (BMI) OF 34.0 TO 34.9 IN ADULT: ICD-10-CM

## 2024-05-09 RX ORDER — PHENTERMINE HYDROCHLORIDE 30 MG/1
30 CAPSULE ORAL EVERY MORNING
Qty: 30 CAPSULE | Refills: 0 | OUTPATIENT
Start: 2024-05-09

## 2024-05-09 RX ORDER — PHENTERMINE HYDROCHLORIDE 30 MG/1
30 CAPSULE ORAL EVERY MORNING
Qty: 30 CAPSULE | Refills: 0 | Status: SHIPPED | OUTPATIENT
Start: 2024-05-09 | End: 2024-06-03

## 2024-05-09 NOTE — TELEPHONE ENCOUNTER
Pt returned call  She has actually gained 2 pounds    She had lost 2 pounds but then hurt her back 3 weeks ago and has been unable to exercise so she has gained 4 pounds    Now she is on prednisone for her back    Routing to PCP    Garret Scales RN on 5/9/2024 at 12:48 PM

## 2024-05-09 NOTE — TELEPHONE ENCOUNTER
I sent her a [employer] message on 5/6 asking how things were going on this med and I haven't heard back. Would like update before I refill.     Bertha Lyles, DNP, APRN, CNP

## 2024-05-09 NOTE — TELEPHONE ENCOUNTER
RN attempted to reach patient. LVMTCB and speak with triage nurse.    Melba MORALES RN on 5/9/2024 at 12:38 PM

## 2024-05-21 ENCOUNTER — OFFICE VISIT (OUTPATIENT)
Dept: PALLIATIVE MEDICINE | Facility: OTHER | Age: 54
End: 2024-05-21
Attending: PHYSICIAN ASSISTANT
Payer: COMMERCIAL

## 2024-05-21 VITALS — DIASTOLIC BLOOD PRESSURE: 74 MMHG | HEART RATE: 87 BPM | SYSTOLIC BLOOD PRESSURE: 138 MMHG | OXYGEN SATURATION: 99 %

## 2024-05-21 DIAGNOSIS — G89.29 CHRONIC RIGHT-SIDED LOW BACK PAIN WITH RIGHT-SIDED SCIATICA: ICD-10-CM

## 2024-05-21 DIAGNOSIS — M54.16 LUMBAR RADICULOPATHY: Primary | ICD-10-CM

## 2024-05-21 DIAGNOSIS — M53.3 SACROILIAC JOINT DYSFUNCTION OF BOTH SIDES: ICD-10-CM

## 2024-05-21 DIAGNOSIS — M54.41 CHRONIC RIGHT-SIDED LOW BACK PAIN WITH RIGHT-SIDED SCIATICA: ICD-10-CM

## 2024-05-21 DIAGNOSIS — G89.29 CHRONIC BILATERAL LOW BACK PAIN WITH LEFT-SIDED SCIATICA: ICD-10-CM

## 2024-05-21 DIAGNOSIS — M54.42 CHRONIC BILATERAL LOW BACK PAIN WITH LEFT-SIDED SCIATICA: ICD-10-CM

## 2024-05-21 PROCEDURE — 99204 OFFICE O/P NEW MOD 45 MIN: CPT | Performed by: STUDENT IN AN ORGANIZED HEALTH CARE EDUCATION/TRAINING PROGRAM

## 2024-05-21 PROCEDURE — 99213 OFFICE O/P EST LOW 20 MIN: CPT | Performed by: STUDENT IN AN ORGANIZED HEALTH CARE EDUCATION/TRAINING PROGRAM

## 2024-05-21 RX ORDER — GABAPENTIN 300 MG/1
300 CAPSULE ORAL 3 TIMES DAILY
Qty: 270 CAPSULE | Refills: 0 | Status: SHIPPED | OUTPATIENT
Start: 2024-05-21

## 2024-05-21 ASSESSMENT — PAIN SCALES - GENERAL: PAINLEVEL: MILD PAIN (3)

## 2024-05-21 NOTE — PROGRESS NOTES
"[facility] Med Spine Evaluation    Date of visit: 5/21/2024    Reason for consultation:    Veronica Messina is a 54 year old female who is seen in consultation today for medical spine evaluation.    PCP is Bertha Lyles.    Please see the [facility] health questionnaire which the patient completed and reviewed with me in detail.    Chief Complaint:    Chief Complaint   Patient presents with    Pain       Pain history:  Veronica Messina is a 54 year old female presenting with a chief pain complaint of low back and leg pain    Onset: 2.5 years, no known injury. Associates this with onset of menopause  Location and Radiation: bilateral R>L low back pain, bilateral lateral hips radiating down down lateral thigh and calf  Provoking: standing after getting out of bed, showering, bending forward and straightening up  Palliating: rolling while laying down onto the side that bothers her  Quality: \"shooting\"  Severity: 2-10/10  Timing: constant  Numbness: denies  Weakness: weak in right leg    Patient denies red flag symptoms of corresponding bowel/bladder symptoms, fever/chills, saddle anesthesia, profound motor loss, weight loss, or sudden unremitting increase in pain.    Current pain medications include:  None    Previous medication treatments included:  Prednisone burst - helpful    Other treatments have included:  Veronica Messina has not been seen at a pain clinic in the past.    PT: has done at least twice for this issue, most recently fall 2023, did HEP for a little - [facility]  Injections: none  Surgery: none  Alternative:    Stretching, massage gun, ice, heat    Past Medical History:  Past Medical History:   Diagnosis Date    Infertility, female     Migraine      Patient Active Problem List    Diagnosis Date Noted    Adjustment disorder with mixed anxiety and depressed mood 03/07/2024     Priority: Medium    Chronic bilateral low back pain with " left-sided sciatica 07/05/2023     Priority: Medium    Bilateral hip pain 07/05/2023     Priority: Medium    Mixed stress and urge urinary incontinence 05/17/2019     Priority: Medium    Intractable chronic migraine without aura 05/17/2019     Priority: Medium       Past Surgical History:  Past Surgical History:   Procedure Laterality Date    BUNIONECTOMY Left     BUNIONECTOMY Right     HC REPAIR OF HAMMERTOE,ONE Right     HYSTEROSCOPY      REPAIR HAMMER TOE Left 9/12/2022    Procedure: 1.  Left second shortening metatarsal osteotomy. 2.  Left second proximal interphalangeal joint fusion.;  Surgeon: Radha Zeng DPM, Podiatry/Foot and Ankle Surgery;  Location: [facility] OR    TONSILLECTOMY  1977     Medications:  Current Outpatient Medications   Medication Sig Dispense Refill    FLUoxetine (PROZAC) 20 MG capsule Take 1 capsule (20 mg) by mouth daily 90 capsule 3    phentermine 30 MG capsule Take 1 capsule (30 mg) by mouth every morning 30 capsule 0    predniSONE (DELTASONE) 20 MG tablet Take 2 tablets (40 mg) by mouth daily (Patient not taking: Reported on 5/21/2024) 10 tablet 0     Allergies:   No Known Allergies      Family history:  Family History   Problem Relation Age of Onset    Hypertension Mother     Diabetes Mother     Prostate Problems Father     Arrhythmia Brother         WPW    Obesity Brother     Thyroid Disease Maternal Grandmother     Dementia Maternal Grandmother     Diabetes Maternal Grandfather     Kidney Disease Maternal Grandfather     Heart Disease Maternal Grandfather     Heart Disease Paternal Grandmother     Cancer Paternal Grandfather         throat    Lung Cancer Paternal Grandfather        Physical Exam:  Vitals:    05/21/24 1510   BP: 138/74   Pulse: 87   SpO2: 99%     Exam:  Constitutional: Well developed, NAD  Head: normocephalic. Atraumatic.   Eyes: no redness or jaundice noted   CV: warm, well perfused extremities   Skin: no suspicious lesions or rashes   Psychiatric: mentation appears  normal and affect full    Neuromuscular Examination:  Normal ROM in lumbar flexion, extension  +TTP at bilateral lower lumbar paraspinals  +TTP at bilateral GT bursae  +TTP SI joint tenderness bilaterally  Normal 5/5 strength in BLE   Normal sensation to light touch in the L3-S1 distributions bilaterally   No ankle clonus bilaterally    KRISTEL: negative bilaterally   FADIR: negative bilaterally   Scour: negative bilaterally   Straight leg raise: negative bilaterally     Bilateral SI joint examination  KRISTEL: neg bl  Gaenslen's: neg bl  Iliac SI distraction: neg bl  Thigh thrust: neg bl  Sidelying SI compression: positive bl  Yeoman: neg bl    Total: 1/6    Diagnostic tests:  none    Personally reviewed imaging: N/A      Assessment:  Lumbar radiculopathy  Lumbar spondylosis  Bilateral SI joint pathology    Veronica Messina is a 54 year old female who presents with bilateral low back pain radiating down bilateral, right greater than left, lateral hip, lateral thigh, lateral calf, stopping above the ankle.  Patient notes numerous movements that aggravate the pain, improved with rolling onto the side that bothers her while laying in bed.  Pain is not associated with numbness, but is associated with weakness in the legs generally.  She has tried a prednisone burst which was significantly helpful.  Physical therapy has been minimally helpful.  Examination shows tenderness to palpation at the bilateral lower lumbar paraspinals, SI joint, bilateral greater trochanters, and significantly positive bilateral SI joint compression.  Otherwise SI joint provocative maneuvers are negative today.  Based on the radiating description of pain and having tried conservative therapy, we will proceed to imaging with an MRI of the lumbar spine.  We will also initiate gabapentin 300 mg 3 times daily.  Will see her back in 1 month to review the imaging and the next steps.    Plan:  Diagnosis reviewed, treatment option addressed, and  risk/benefits discussed.     Procedures: Consider FRANCIS in future  Physical Therapy: continue HEP and gym work  Diagnostic Studies: MRI Lumbar spine reviewed  Medication Management: Gabapentin 300mg 3 times daily ordered - Take 1 capsule at nighttime only for the first 7 days, and if tolerated well without dizziness or drowsiness, add the daytime doses for a total of 1 capsule 3 times daily   Follow up: 1 month follow-up    Rodolfo Meza MD  Interventional Pain Medicine  [facility]

## 2024-05-21 NOTE — PATIENT INSTRUCTIONS
Procedures: Consider FRANCIS in future  Physical Therapy: continue HEP and gym work  Diagnostic Studies: MRI Lumbar spine reviewed  Medication Management: Gabapentin 300mg 3 times daily ordered - Take 1 capsule at nighttime only for the first 7 days, and if tolerated well without dizziness or drowsiness, add the daytime doses for a total of 1 capsule 3 times daily   Follow up: 1 month follow-up    Rodolfo Meza MD  Interventional Pain Medicine  [facility]    ----------------------------------------------------------------  Clinic Number:  [phone]   Call with any questions about your care and for scheduling assistance.   Calls are returned Monday through Friday between 8 AM and 4:30 PM. We usually get back to you within 2 business days depending on the issue/request.    If we are prescribing your medications:  Call your pharmacy directly to request a refill. Please allow 3-4 days to be processed.

## 2024-05-29 NOTE — PROGRESS NOTES
I met with Veronica Messina at the request of Bertha Lyles CNP to recheck her blood pressure.  Blood pressure medications on the med list were reviewed with patient.    Patient has taken all medications as per usual regimen: Yes  Patient reports tolerating them without any issues or concerns: Yes    Vitals:    04/10/24 0811 04/10/24 0815   BP: 128/81 119/81   BP Location: Right arm Right arm   Patient Position: Sitting Sitting   Cuff Size: Adult Large Adult Large   Pulse: 71 76       Blood pressure was taken, previous encounter was reviewed, recorded blood pressure below 140/90.  Patient was discharged and the note will be sent to the provider for final review.    General Sunscreen Counseling: I recommended a broad spectrum sunscreen with a SPF of 30 or higher.  I explained that SPF 30 sunscreens block approximately 97 percent of the sun's harmful rays.  Sunscreens should be applied at least 15 minutes prior to expected sun exposure and then every 2 hours after that as long as sun exposure continues. If swimming or exercising sunscreen should be reapplied every 45 minutes to an hour after getting wet or sweating.  One ounce, or the equivalent of a shot glass full of sunscreen, is adequate to protect the skin not covered by a bathing suit. I also recommended a lip balm with a sunscreen as well. Sun protective clothing can be used in lieu of sunscreen but must be worn the entire time you are exposed to the sun's rays. Detail Level: Detailed

## 2024-06-03 DIAGNOSIS — E66.09 CLASS 1 OBESITY DUE TO EXCESS CALORIES WITHOUT SERIOUS COMORBIDITY WITH BODY MASS INDEX (BMI) OF 34.0 TO 34.9 IN ADULT: ICD-10-CM

## 2024-06-03 DIAGNOSIS — E66.811 CLASS 1 OBESITY DUE TO EXCESS CALORIES WITHOUT SERIOUS COMORBIDITY WITH BODY MASS INDEX (BMI) OF 34.0 TO 34.9 IN ADULT: ICD-10-CM

## 2024-06-03 RX ORDER — PHENTERMINE HYDROCHLORIDE 30 MG/1
CAPSULE ORAL
Qty: 30 CAPSULE | Refills: 0 | Status: SHIPPED | OUTPATIENT
Start: 2024-06-03 | End: 2024-07-29

## 2024-06-08 ENCOUNTER — HOSPITAL ENCOUNTER (OUTPATIENT)
Dept: MRI IMAGING | Facility: CLINIC | Age: 54
Discharge: HOME | End: 2024-06-08
Attending: STUDENT IN AN ORGANIZED HEALTH CARE EDUCATION/TRAINING PROGRAM | Admitting: STUDENT IN AN ORGANIZED HEALTH CARE EDUCATION/TRAINING PROGRAM
Payer: COMMERCIAL

## 2024-06-08 DIAGNOSIS — M54.16 LUMBAR RADICULOPATHY: ICD-10-CM

## 2024-06-08 PROCEDURE — 72148 MRI LUMBAR SPINE W/O DYE: CPT

## 2024-07-18 ASSESSMENT — PAIN SCALES - PAIN ENJOYMENT GENERAL ACTIVITY SCALE (PEG)
PEG_TOTALSCORE: 7
AVG_PAIN_PASTWEEK: 6
INTERFERED_ENJOYMENT_LIFE: 8
INTERFERED_GENERAL_ACTIVITY: 7

## 2024-07-22 DIAGNOSIS — E66.811 CLASS 1 OBESITY DUE TO EXCESS CALORIES WITHOUT SERIOUS COMORBIDITY WITH BODY MASS INDEX (BMI) OF 34.0 TO 34.9 IN ADULT: ICD-10-CM

## 2024-07-22 DIAGNOSIS — E66.09 CLASS 1 OBESITY DUE TO EXCESS CALORIES WITHOUT SERIOUS COMORBIDITY WITH BODY MASS INDEX (BMI) OF 34.0 TO 34.9 IN ADULT: ICD-10-CM

## 2024-07-23 RX ORDER — PHENTERMINE HYDROCHLORIDE 30 MG/1
CAPSULE ORAL
Qty: 30 CAPSULE | Refills: 0 | OUTPATIENT
Start: 2024-07-23

## 2024-07-23 NOTE — TELEPHONE ENCOUNTER
She is 2 months overdue for an e-visit for follow-up of this medication.   Bertha Lyles, DNP, APRN, CNP

## 2024-07-29 ENCOUNTER — VIRTUAL VISIT (OUTPATIENT)
Dept: PEDIATRICS | Facility: CLINIC | Age: 54
End: 2024-07-29
Payer: COMMERCIAL

## 2024-07-29 DIAGNOSIS — E66.09 CLASS 1 OBESITY DUE TO EXCESS CALORIES WITHOUT SERIOUS COMORBIDITY WITH BODY MASS INDEX (BMI) OF 34.0 TO 34.9 IN ADULT: Primary | ICD-10-CM

## 2024-07-29 DIAGNOSIS — E66.811 CLASS 1 OBESITY DUE TO EXCESS CALORIES WITHOUT SERIOUS COMORBIDITY WITH BODY MASS INDEX (BMI) OF 34.0 TO 34.9 IN ADULT: Primary | ICD-10-CM

## 2024-07-29 PROCEDURE — 99214 OFFICE O/P EST MOD 30 MIN: CPT | Mod: 95 | Performed by: NURSE PRACTITIONER

## 2024-07-29 RX ORDER — BUPROPION HYDROCHLORIDE 150 MG/1
150 TABLET ORAL EVERY MORNING
Qty: 30 TABLET | Refills: 1 | Status: SHIPPED | OUTPATIENT
Start: 2024-07-29

## 2024-07-29 NOTE — PROGRESS NOTES
"Veronica is a 54 year old who is being evaluated via a billable video visit.          Assessment & Plan     Class 1 obesity due to excess calories without serious comorbidity with body mass index (BMI) of 34.0 to 34.9 in adult  Discontinue phentermine. GLP-1s not covered by insurance. Discussed bupropion-naltrexone, although she isn't willing to stop alcohol all together so naltrexone not a good fit. Will start bupropion today and have her follow-up with MTM. Encouraged to continue her excellent lifestyle habits. Reassurance provided.  - buPROPion (WELLBUTRIN XL) 150 MG 24 hr tablet; Take 1 tablet (150 mg) by mouth every morning  - Med Therapy Management Referral          BMI  Estimated body mass index is 35.65 kg/m  as calculated from the following:    Height as of 5/8/24: 1.66 m (5' 5.35\").    Weight as of 5/8/24: 98.2 kg (216 lb 9 oz).   Weight management plan: Discussed healthy diet and exercise guidelines      Subjective   Veronica is a 54 year old, presenting for the following health issues:  Recheck Medication    HPI     Presents for follow-up phentermine.     3/7/24, started 15 mg daily.   4/4/24, increased to 30 mg daily.     She is frustrated with the phentermine. She hasn't been able to lose any additional weight. Her current weight is between 210-213 for the past 6 weeks. She really doesn't feel like the phentermine has influence her diet or hunger at all.     Exercise - gym 4-5 days/week, working with a  weekly, cardio, weights. At least 30-35 min cardio, 25-40 min varied weight work outs - upper and lower body and abs.   Diet - modifying, admits she could be doing better. Trying to cut out sugar, decrease carbs, getting fruits, increasing vegetables.     Admits she had a tough May. She and her daughter are working through some things since the loss of their /father last November. They both have therapists and have also been doing some family therapy. Veronica has started to thinking about " dating, admits she is lonely.     Wt Readings from Last 10 Encounters:   05/08/24 98.2 kg (216 lb 9 oz)   03/07/24 97.4 kg (214 lb 12.8 oz)   05/31/23 99.7 kg (219 lb 14.4 oz)   10/25/22 103.9 kg (229 lb)   10/24/22 104 kg (229 lb 4.8 oz)   09/12/22 105 kg (231 lb 6.4 oz)   08/29/22 106.6 kg (235 lb 1.6 oz)   05/11/22 111.1 kg (245 lb)   10/21/20 113.4 kg (250 lb)   07/16/20 112 kg (247 lb)             Objective       Vitals:  No vitals were obtained today due to virtual visit.    Physical Exam   GENERAL: alert and no distress  EYES: Eyes grossly normal to inspection.  No discharge or erythema, or obvious scleral/conjunctival abnormalities.  RESP: No audible wheeze, cough, or visible cyanosis.    SKIN: Visible skin clear. No significant rash, abnormal pigmentation or lesions.  NEURO: Cranial nerves grossly intact.  Mentation and speech appropriate for age.  PSYCH: Appropriate affect, tone, and pace of words        Video-Visit Details    Type of service:  Video Visit   Originating Location (pt. Location): Home    Distant Location (provider location):  Off-site  Platform used for Video Visit: Leena  Signed Electronically by: JACK Chavez CNP

## 2024-07-31 ENCOUNTER — TELEPHONE (OUTPATIENT)
Dept: PEDIATRICS | Facility: CLINIC | Age: 54
End: 2024-07-31
Payer: COMMERCIAL

## 2024-07-31 NOTE — TELEPHONE ENCOUNTER
MT referral from: [facility] visit (referral by provider)    MT referral outreach attempt #2 on July 31, 2024 at 11:09 AM      Outcome: Patient not reachable after several attempts, sent MasteryConnect message    Use [facility]p pathfinder  for the carrier/Plan on the flowsheet      Sana Security[employer] Message Sent    FAVIOLA Osborne   [phone]

## 2024-08-01 ASSESSMENT — PAIN SCALES - PAIN ENJOYMENT GENERAL ACTIVITY SCALE (PEG)
INTERFERED_ENJOYMENT_LIFE: 8
AVG_PAIN_PASTWEEK: 6
INTERFERED_GENERAL_ACTIVITY: 7
PEG_TOTALSCORE: 7

## 2024-08-06 ENCOUNTER — OFFICE VISIT (OUTPATIENT)
Dept: PALLIATIVE MEDICINE | Facility: OTHER | Age: 54
End: 2024-08-06
Attending: STUDENT IN AN ORGANIZED HEALTH CARE EDUCATION/TRAINING PROGRAM
Payer: COMMERCIAL

## 2024-08-06 VITALS — DIASTOLIC BLOOD PRESSURE: 73 MMHG | SYSTOLIC BLOOD PRESSURE: 146 MMHG | HEART RATE: 67 BPM | OXYGEN SATURATION: 98 %

## 2024-08-06 DIAGNOSIS — M54.16 LUMBAR RADICULOPATHY: Primary | ICD-10-CM

## 2024-08-06 PROCEDURE — 99214 OFFICE O/P EST MOD 30 MIN: CPT | Performed by: STUDENT IN AN ORGANIZED HEALTH CARE EDUCATION/TRAINING PROGRAM

## 2024-08-06 ASSESSMENT — PAIN SCALES - GENERAL: PAINLEVEL: EXTREME PAIN (9)

## 2024-08-06 NOTE — PROGRESS NOTES
Patient presents to the clinic today for a visit  with Rodolfo Meza MD            5/31/2023     1:08 PM 8/6/2024     3:21 PM   PEG Score   PEG Total Score 2.33 6       UDS/CSA-    Medications-    QUESTIONS:    Adamaris Currie MA  [facility] [place]

## 2024-08-06 NOTE — PATIENT INSTRUCTIONS
Procedures: Bilateral L5 TFESI  Physical Therapy: continue HEP and gym work  Diagnostic Studies: MRI Lumbar spine reviewed today  Medication Management: Continue gabapentin 600mg nightly  Follow up: 1 month post-procedure    Rodolfo Meza MD  Interventional Pain Medicine  [facility]    Clinic Number:  [phone]  Call with any questions about your care and for scheduling assistance.   Calls are returned Monday through Friday between 8 AM and 4:30 PM. We usually get back to you within 2 business days depending on the issue/request.    If we are prescribing your medications:  For opioid medication refills, call the clinic or send a JustGo message 7 days in advance.  Please include:  Name of requested medication  Name of the pharmacy.  For non-opioid medications, call your pharmacy directly to request a refill. Please allow 3-4 days to be processed.   Per [place] State Law:  All controlled substance prescriptions must be filled within 30 days of being written.    For those controlled substances allowing refills, pickup must occur within 30 days of last fill.      We believe regular attendance is key to your success in our program!    Any time you are unable to keep your appointment we ask that you call us at least 24 hours in advance to cancel.This will allow us to offer the appointment time to another patient.   Multiple missed appointments may lead to dismissal from the clinic.

## 2024-08-06 NOTE — PROGRESS NOTES
[facility] Spine Follow-up    Date of visit: 8/6/2024    Chief complaint: Pain      Interval history:  Since her last visit, Veronica Messina reports:  Gabapentin caused drowsiness, taking 2 capsules at night which is allowing patient to sleep.  Tweaked back yesterday which worsened pain and is again causing radicular type pain.    Pain intensity currently is 9 on a scale of 0-10.     Current pain treatments:   Gabapentin 600mg at bedtime - Helpful    Other relevant meds:  Fluoxetine 20 mg daily    Previous medication treatments included:  Prednisone burst - helpful     Other treatments have included:  Veronica Messina has not been seen at a pain clinic in the past.    PT: has done at least twice for this issue, most recently fall 2023, did HEP for a little - [facility]  Injections: none  Surgery: none  Alternative:               Stretching, massage gun, ice, heat    Side Effects: Drowsiness from gabapentin    Medications:  Current Outpatient Medications   Medication Sig Dispense Refill    buPROPion (WELLBUTRIN XL) 150 MG 24 hr tablet Take 1 tablet (150 mg) by mouth every morning 30 tablet 1    FLUoxetine (PROZAC) 20 MG capsule Take 1 capsule (20 mg) by mouth daily 90 capsule 3    gabapentin (NEURONTIN) 300 MG capsule Take 1 capsule (300 mg) by mouth 3 times daily Take 1 capsule at nighttime only for the first 7 days, and if tolerated well without dizziness or drowsiness, add the daytime doses for a total of 1 capsule 3 times daily 270 capsule 0       Medical History: any changes in medical history since they were last seen? No    Physical Exam:  Blood pressure (!) 146/73, pulse 67, last menstrual period 03/15/2019, SpO2 98%, not currently breastfeeding.  Constitutional: Well developed, NAD  Head: normocephalic. Atraumatic.   Eyes: no redness or jaundice noted   CV: warm, well perfused extremities   Skin: no suspicious lesions or rashes   Psychiatric: mentation appears normal and  affect full    Diagnostics:  EXAM: MR LUMBAR SPINE W/O CONTRAST  LOCATION: [facility]  DATE: 6/8/2024     INDICATION:  Suspected bilateral, right greater than left L5 radiculopathy  COMPARISON: None.  TECHNIQUE: Routine Lumbar Spine MRI without IV contrast.     FINDINGS:   Nomenclature is based on 5 lumbar type vertebral bodies with L5-S1 defined on image 51 of series 901. Alignment is unremarkable. Vertebral body heights maintained.  Degenerative fatty marrow endplate changes at L5-S1 (Modic type II). Otherwise, no   suspicious signal abnormality. Normal distal spinal cord and cauda equina with conus medullaris at L1-L2.      Prevertebral soft tissues are unremarkable. Mild dorsal paraspinal muscular atrophy. Visualized intra-abdominal structures are unremarkable.     T12-L1: Unremarkable appearance of the disc. No significant facet arthropathy. No significant canal or foraminal stenosis.      L1-L2: Unremarkable appearance of the disc. Mild bilateral facet arthropathy. No significant canal or foraminal stenosis.     L2-L3: Mild diffuse bulge. Mild disc height loss. Moderate bilateral facet arthropathy. No significant canal or foraminal stenosis.      L3-L4: Diffuse bulge. Mild disc height loss. Mild bilateral facet arthropathy. No significant canal or foraminal stenosis.     L4-L5: Diffuse bulge. Mild disc height loss. Moderate bilateral facet arthropathy. No significant canal or foraminal stenosis.     L5-S1: Posterior disc and osteophyte with contact of the exiting bilateral L5 nerve roots at the foramen. Mild disc height loss. Moderate bilateral facet arthropathy. No significant canal stenosis. Mild bilateral foraminal stenosis.                                                                      IMPRESSION:  1.  Mild lumbar spondylosis, worse at L5-S1. No high-grade canal or foraminal stenosis.  2.  Disc and osteophyte at L5-S1 with contact of the exiting L5 nerve roots at the  foramen. Per the provided indication patient has bilateral L5 radiculopathy.    Personally reviewed: yes    AAssessment:  Lumbar radiculopathy  Lumbar spondylosis  Bilateral SI joint pathology     Veronica Messina is a 54 year old female who presents with bilateral low back pain radiating down bilateral, right greater than left, lateral hip, lateral thigh, lateral calf, stopping above the ankle.  Patient notes numerous movements that aggravate the pain, improved with rolling onto the side that bothers her while laying in bed.  Pain is not associated with numbness, but is associated with weakness in the legs generally.  She has tried a prednisone burst which was significantly helpful.  Physical therapy has been minimally helpful.  Examination shows tenderness to palpation at the bilateral lower lumbar paraspinals, SI joint, bilateral greater trochanters, and significantly positive bilateral SI joint compression.  Otherwise SI joint provocative maneuvers are negative today.  We previously treated this pain is lumbar radiculopathy with gabapentin, which caused daytime drowsiness, so the patient converted to taking 600 mg nightly which is helpful to allow for sleep.  She tweaked her back recently and would like to pursue interventional management at this point.  We will proceed with bilateral L5 TFESI.  Will see her back 1 month postprocedure.     Plan:  Diagnosis reviewed, treatment option addressed, and risk/benefits discussed.      Procedures: Bilateral L5 TFESI ordered  Physical Therapy: continue HEP and gym work  Diagnostic Studies: MRI Lumbar spine reviewed today  Medication Management: Continue gabapentin 600mg nightly  Follow up: 1 month post-procedure    Rodolfo Meza MD  Interventional Pain Medicine  [facility]

## 2024-08-07 ENCOUNTER — TELEPHONE (OUTPATIENT)
Dept: PALLIATIVE MEDICINE | Facility: OTHER | Age: 54
End: 2024-08-07
Payer: COMMERCIAL

## 2024-08-07 DIAGNOSIS — M54.16 LUMBAR RADICULOPATHY: Primary | ICD-10-CM

## 2024-08-07 NOTE — TELEPHONE ENCOUNTER
"Screening Questions for Radiology Injections:    Injection to be done at which interventional clinic site? [facility]    If choosing [facility] for location, please inform patient:  \"[facility] is a Hospital based clinic. Before your visit, you should check with your insurance about how it covers the charges for facility services in a hospital-based clinic.     Procedure ordered by Derrick    Procedure ordered? Bilateral L5 TFESI   Transforaminal Cervical FRANCIS - Send to [facility] ([facility]) - No [facility] providers perform this procedure    What insurance would patient like us to bill for this procedure? [employer]  IF SCHEDULING IN [place] PAIN OR SPINE PLEASE SCHEDULE AT LEAST 7-10 BUSINESS DAYS OUT SO A PA CAN BE OBTAINED  Worker's comp or MVA (motor vehicle accident) -Any injection DO NOT SCHEDULE and route to Rahul Mata.    [employer] insurance - For SI joint injections, DO NOT SCHEDULE and route to Brigida Maguire.     ALL BCBS, [employer] and [employer] - DO NOT SCHEDULE and route to Brigida Maguire  MEDICA- ALL INJECTIONS- route to Brigida Andrez    Is patient scheduled at [facility]? no   If YES, route every encounter to [facility] SPINE CENTER CARE NAVIGATION POOL [[phone]]    Is an  needed? No     Patient has a  home? (Review Grid) YES: ok    Any chance of pregnancy? NO   If YES, do NOT schedule and route to RN pool  - Dr. Sebastian route to Petra Lynne and PM&R Nurse  [[id number]]      Is patient actively being treated for cancer or immunocompromised? No  If YES, do NOT schedule and route to RN pool/ Dr. Sebastian's Team    Does the patient have a bleeding or clotting disorder? No   If YES, okay to schedule AND route to RN nurse / Dr. Sebastian's Team   (For any patients with platelet count <100, RN must forward to provider)    Is patient taking any Blood Thinners OR Antiplatelet medication?  No   If hold needed, do NOT schedule, route to RN pool/ Dr. Sebastian's Team  Examples: "   Blood Thinners: (Coumadin, Warfarin, Jantoven, Pradaxa, Xarelto, Eliquis, Edoxaban, Enoxaparin, Lovenox, Heparin, Arixtra, Fondaparinux or Fragmin)  Antiplatelet Medications: (Plavix, Brilinta or Effient)     Is patient taking any aspirin products (includes Excedrin and Fiorinal)? No   If yes route to RN pool/ Dr. Sebastian's Team - Do not schedule    Is patient taking any GLP-1 Antagonist (hold needed for sedation patients only) No   (semaglutide (Ozempic, Wegovy), dulaglutide (Trulicity), exenatide ER (Bydureon), tirzepatide (Mounjaro), Liraglutide (Saxenda, Victoza), semaglutide (Rybelsus), Terzepatide (Zepbound)  If YES, okay to schedule AND route to RN  / Dr. Sebastian's Team      Any allergies to contrast dye, iodine, shellfish, or numbing and steroid medications? No  If YES, schedule and add allergy information to appointment notes AND route to the RN pool/ Dr. Sebastian's Team  If FRANCIS and Contrast Dye / Iodine Allergy? DO NOT SCHEDULE, route to RN pool/ Dr. Sebastian's Team  Allergies: Patient has no known allergies.     Does patient have an active infection or treated for one within the past week? No  Is patient currently taking any antibiotics or steroid medications?  No   For patients on chronic, preventative, or prophylactic antibiotics, procedures may be scheduled.   For patients on antibiotics for active or recent infection, schedule 4 days after completed.  For patients on steroid medications, schedule 4 days after completed.     Has the patient had a flu shot or any other vaccinations within the past 7 days? No  If yes, explain that for the vaccine to work best they need to:     wait 1 week before and 1 week after getting any Vaccine  wait 1 week before and 2 weeks after getting any Covid Vaccine   If patient has concerns about the timing, send to RN pool/ Dr. Sebastian's Team    Does patient have an MRI/CT?  YES: MRI Include Date and Check Procedure Scheduling Grid to see if required.  Was the MRI/CT done  within the last 3 years?  Yes   If no route to RN Pool/ Dr. Sebastian's Team  If yes, where was the MRI/CT done? [facility]   Refer to PACS Transmissions list for approved external locations and route to RN Pool High Priority/ Dr. Mchughs Team  If MRI was not done at approved external location do NOT schedule and route to RN pool/ Dr. Sebastian's Team    If patient has an imaging disc, the injection MAY be scheduled but patient must bring disc to appt or appt will be cancelled.    Is patient able to transfer to a procedure table with minimal or no assistance? Yes   If no, do NOT schedule and route to RN Pool/ Dr. Sebastian's Team    Procedure Specific Instructions:  If celiac plexus block, informed patient NPO for 6 hours and that it is okay to take medications with sips of water, especially blood pressure medications Not Applicable       If this is for a cervical procedure, informed patient that aspirin needs to be held for 6 days.   Not Applicable    Sedation, If Sedation is ordered for any procedure, patient must be NPO for 6 hours prior to procedure Not Applicable    If IV needed:  Do not schedule procedures requiring IV placement in the first appointment of the day or first appointment after lunch. Do NOT schedule at 0745, 0815 or 1245. ok  Instructed patient to arrive 30 minutes early for IV start if required. (Check Procedure Scheduling Grid)  Not Applicable    Reminders:  If you are started on any steroids or antibiotics between now and your appointment, you must contact us because the procedure may need to be cancelled.  Yes    As a reminder, receiving steroids can decrease your body's ability to fight infection.   Would you still like to move forward with scheduling the injection?  Yes    IV Sedation is not provided for procedures. If oral anti-anxiety medication is needed, the patient should request this from their referring provider.    Instruct patient to arrive as directed prior to the scheduled appointment  time:  If IV needed 30 minutes before appointment time     For patients 85 or older we recommend having an adult stay w/ them for the remainder of the day.     If the patient is Diabetic, remind them to bring their glucometer.      Does the patient have any questions?  NO  Radha Mata  [facility]

## 2024-08-13 NOTE — PROGRESS NOTES
[facility] - Procedure Note    Date of Service: 8/14/2024    Procedure performed: Bilateral L5 transforaminal epidural steroid injection with fluoroscopic guidance  Diagnosis: Lumbar spondylosis; Lumbar radiculitis/radiculopathy  : Martine Jaimes MD   Anesthesia: none    Indications: Veronica Messina is a 54 year old female who is seen at the request of Dr. Meza for lumbar transforaminal epidural steroid injection. The patient describes bilateral low back pain with radiation down the lateral thigh and calf bilaterally. The patient has been exhibiting symptoms consistent with lumbar intraspinal inflammation and radiculopathy. Symptoms have been persistent, disabling, and intermittently severe. The patient reports minimal improvement with conservative treatment, including PT and medications.    LUMBAR MRI was done on 6/8/2024 which showed:   FINDINGS:   Nomenclature is based on 5 lumbar type vertebral bodies with L5-S1 defined on image 51 of series 901. Alignment is unremarkable. Vertebral body heights maintained.  Degenerative fatty marrow endplate changes at L5-S1 (Modic type II). Otherwise, no   suspicious signal abnormality. Normal distal spinal cord and cauda equina with conus medullaris at L1-L2.      Prevertebral soft tissues are unremarkable. Mild dorsal paraspinal muscular atrophy. Visualized intra-abdominal structures are unremarkable.     T12-L1: Unremarkable appearance of the disc. No significant facet arthropathy. No significant canal or foraminal stenosis.      L1-L2: Unremarkable appearance of the disc. Mild bilateral facet arthropathy. No significant canal or foraminal stenosis.     L2-L3: Mild diffuse bulge. Mild disc height loss. Moderate bilateral facet arthropathy. No significant canal or foraminal stenosis.      L3-L4: Diffuse bulge. Mild disc height loss. Mild bilateral facet arthropathy. No significant canal or foraminal stenosis.     L4-L5: Diffuse bulge.  Mild disc height loss. Moderate bilateral facet arthropathy. No significant canal or foraminal stenosis.     L5-S1: Posterior disc and osteophyte with contact of the exiting bilateral L5 nerve roots at the foramen. Mild disc height loss. Moderate bilateral facet arthropathy. No significant canal stenosis. Mild bilateral foraminal stenosis.                                                                    IMPRESSION:  1.  Mild lumbar spondylosis, worse at L5-S1. No high-grade canal or foraminal stenosis.  2.  Disc and osteophyte at L5-S1 with contact of the exiting L5 nerve roots at the foramen. Per the provided indication patient has bilateral L5 radiculopathy.    Allergies:    No Known Allergies     Vitals:  /68 (BP Location: Left arm, Patient Position: Chair, Cuff Size: Adult Large)   Pulse 84   LMP 03/15/2019 (Approximate)   SpO2 93%     Review of Systems: The patient denies recent fever, chills, illness, use of antibiotics or anticoagulants. All other 10-point review of systems negative.     Procedure: The procedure and risks were explained, and informed written consent was obtained from the patient. Risks include but are not limited to: infection, bleeding, increased pain, and damage to soft tissue, nerve, muscle, and vasculature structures. After getting informed consent, patient was brought into the procedure suite and was placed in a prone position on the procedure table. A Pause for the Cause was performed. Patient was prepped and draped in sterile fashion.     After identifying the bilateral L5 neuroforamen, the C-arm was rotated to a bilateral lateral oblique angle.  A total of 4.5 mL of Lidocaine 1% was used to anesthetize the skin and the needle track at a skin entry site coaxial with the fluoroscopy beam, and overriding the superior aspect of the neuroforamen.  A 22 gauge 5 inch spinal needle was advanced under intermittent fluoroscopy until it entered the foramen superiorly.    The position  was then inspected from anteroposterior and lateral views, and the needle adjusted appropriately.  After negative aspiration, a total of 1 mL of Omnipaque-300 was injected using static and continuous fluoroscopy confirming appropriate position, with spread along the nerve root sheath and into the epidural space, with no intravascular or intrathecal uptake. 0 mL of Omnipaque-300 was wasted.    2mL of 1% lidocaine with 20 mg of dexamethasone was injected, divided between the 2 sides.  The needle was removed. Hemostasis was achieved, the area was cleaned, and bandaids were placed when appropriate. Images were saved to PACS.    The patient tolerated the procedure well, and was taken to the recovery room, and there was no evidence of procedural complications. No new sensory or motor deficits were noted following the procedure. The patient was stable and able to ambulate on discharge home. Post-procedure instructions were provided.     Pre-procedure pain score: 5/10 in the back, 4/10 in the leg  Post-procedure pain score: 0/10 in the back, 0/10 in the leg    Assessment/Plan: Veronica Messina is a 54 year old female s/p Bilateral L5 transforaminal epidural steroid injection today for lumbar spondylosis, radiculitis/radiculopathy.     1. Following today's procedure, the patient was advised to contact the Pain Management Center for any of the following:   Fever, chills, or night sweats   New onset of pain, numbness, or weakness   Any questions/concerns regarding the procedure  If unable to contact the Pain Center, the patient was instructed to go to a local Emergency Room for any complications.   2. The patient should follow-up with the referring provider in 2 weeks for post-procedure evaluation.      SHAYAN MENARD MD   Pain Management

## 2024-08-13 NOTE — PROGRESS NOTES
Medication Therapy Management (MTM) Encounter    ASSESSMENT:                            Medication Adherence/Access: No issues identified    Anxiety/Depression:   Improved with the fluoxetine, would benefit from continuing working with therapist    Class II Obesity (BMI 35 to 39.9):   Patient would benefit from  starting naltrexone and lifestyle modifications.      Chronic Pain: improving with recent injection, would benefit from continuing to work with Dr. Meza     Supplements:   stable    PLAN:                            Start naltrexone 1/2 of the 50mg tablet at lunch and may increase to 1 tablet per day.    Great job on working with your !  Continue to work on adding back in exercise and diet.    Work on your diet: 1,200 to 1,500 kcal/day for women.  Activity:  try to aim for at least 30 minutes of moderate-intensity physical activity on most (preferably all) days of the week.    Improving Your Eating Habits  https://www.cdc.gov/healthy-weight-growth/losing-weight/improve-eating-habits.html    Apps that can be helpful for weight loss  Continue to use Lifetime raul!  Below are some other option.  Noom (subscription cost) - [email or web address]  My Fitness Pal (free version) - [email or web address]    Difference between Noom and My Fitness Pal  - [email or web address]    Follow-up: Return in about 1 month (around 9/16/2024) for MTM Pharmacist Visit, video visit.    SUBJECTIVE/OBJECTIVE:                          Veronica Messina is a 54 year old female seen for an initial visit. She was referred to me from Bertha Lyles.      Reason for visit: weight management.  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    Allergies/ADRs: Reviewed in chart  Past Medical History: Reviewed in chart  Tobacco: She reports that she has never smoked. She has never been exposed to tobacco smoke. She has never used smokeless tobacco.  Alcohol: 1-3  "beverages /month  Caffeine: coffee 1 cup 4-5 times a week, diet mountain dew per day    Medication Adherence/Access: No issues  The patient fills medications at [place]: NO, fills medications at [facility].    Mental Health   Anxiety and Depression  Bupropion ER 150mg once daily  Fluoxetine 20mg once daily.   Patient reports lost  9 months ago, daughter had some issues last 2 years.  Seeing therapist, seeing improvement.  Feeling her mood is regulated.  High functioning anxiety. Working on better self care and self talk.  Working on balance.  No increase of anxiety on bupropion.  Side effects: no side effects     Weight Management   Bupropion  mg once daily  Patient reports feeling full on the bupropion.  Not losing weight on bupropion, weights herself twice a week.  Lost 35 lbs and reached plateau, took phentermine for about 4 months.  Goal is to lose 25-30lb.  In Menopause, night sweats stopped, facial hair growth.  Changing exercise did not work. Reports 2-5pm is her time of day she craves the most.  Nutrition/Eating Habits: Working on fruits and veggies, lean protein, cutting back red meat, more salads.  Hard to cut out sugars and sweets. [employer], will be starting to use this. Trainer is Adam.  Exercise/Activity: Hard to work out with back pain. Upper body exercises.  Not able to get to gym in last 7 days. Previously did the following \"gym 4-5 days/week, working with a  weekly, cardio, weights. At least 30-35 min cardio, 25-40 min varied weight work outs - upper and lower body and abs\"  Medications Tried/Failed:  Phentermine: failed  GLP-1 agonist: not covered by insurance     Initial Consult Weight: 211lb  Target weight: 175-180lb    Wt Readings from Last 4 Encounters:   05/08/24 216 lb 9 oz (98.2 kg)   03/07/24 214 lb 12.8 oz (97.4 kg)   05/31/23 219 lb 14.4 oz (99.7 kg)   10/25/22 229 lb (103.9 kg)     Estimated body mass index is 35.65 kg/m  as calculated from the following:    Height " "as of 5/8/24: 5' 5.35\" (1.66 m).    Weight as of 5/8/24: 216 lb 9 oz (98.2 kg).  No results found for: \"A1C\"    Chronic pain:    Gabapentin 300-600mg bedtime, she will move to 300mg bedtime   Excedrin migraine as needed takes once every 2 months  Had epidural in her back yesterday and hope to stop gabapentin in the future.  Will follow up with Dr. Meza next month.  Side effects: drowsiness, hard to get going in the morning   Patient reports shooting pain down her legs and gabapentin help to fall asleep  Pain type/location: chronic bilateral low back pain with left-sided sciatica, bilateral hip pain, migraines  Follows with Rodolfo Meza, Physical Medicine and Rehabilitation, last visit 8/6/24 recommended Procedures: Bilateral L5 TFESI ordered; Physical Therapy: continue HEP and gym work    Supplements:   Vitamin D 1000 mg every day   AZO/cranberry every day for UTI prevention  No reported issues at this time.    Vitamin D Deficiency Screening Results:  Lab Results   Component Value Date    VITDT 19 (L) 03/07/2024      Today's Vitals: LMP 03/15/2019 (Approximate)   ----------------    I spent 55 minutes with this patient today. All changes were made via collaborative practice agreement with JACK Chavez CNP. A copy of the visit note was provided to the patient's provider(s).    A summary of these recommendations was sent via [employer].    Petra Sheldon, PharmD BCPS  Medication Therapy Management Practitioner  Pharmacist    Telemedicine Visit Details  Type of service:  Video Conference via Slacker  Start Time:  7:06 am  End Time:  8:01am     Medication Therapy Recommendations  Class 2 obesity without serious comorbidity with body mass index (BMI) of 35.0 to 35.9 in adult, unspecified obesity type    Current Medication: buPROPion (WELLBUTRIN XL) 150 MG 24 hr tablet   Rationale: Synergistic therapy - Needs additional medication therapy - Indication   Recommendation: Start Medication - naltrexone 50 MG tablet "   Status: Accepted per CPA

## 2024-08-14 ENCOUNTER — RADIOLOGY INJECTION OFFICE VISIT (OUTPATIENT)
Dept: PALLIATIVE MEDICINE | Facility: CLINIC | Age: 54
End: 2024-08-14
Attending: STUDENT IN AN ORGANIZED HEALTH CARE EDUCATION/TRAINING PROGRAM
Payer: COMMERCIAL

## 2024-08-14 VITALS — OXYGEN SATURATION: 96 % | HEART RATE: 65 BPM | DIASTOLIC BLOOD PRESSURE: 84 MMHG | SYSTOLIC BLOOD PRESSURE: 132 MMHG

## 2024-08-14 DIAGNOSIS — M54.16 LUMBAR RADICULOPATHY: Primary | ICD-10-CM

## 2024-08-14 PROCEDURE — 64483 NJX AA&/STRD TFRM EPI L/S 1: CPT | Mod: 50 | Performed by: ANESTHESIOLOGY

## 2024-08-14 RX ORDER — DEXAMETHASONE SODIUM PHOSPHATE 10 MG/ML
10 INJECTION, SOLUTION INTRAMUSCULAR; INTRAVENOUS ONCE
Status: COMPLETED | OUTPATIENT
Start: 2024-08-14 | End: 2024-08-14

## 2024-08-14 RX ADMIN — DEXAMETHASONE SODIUM PHOSPHATE 10 MG: 10 INJECTION, SOLUTION INTRAMUSCULAR; INTRAVENOUS at 09:19

## 2024-08-14 RX ADMIN — DEXAMETHASONE SODIUM PHOSPHATE 10 MG: 10 INJECTION, SOLUTION INTRAMUSCULAR; INTRAVENOUS at 09:20

## 2024-08-14 NOTE — NURSING NOTE
Pre-procedure Intake    If YES to any questions or NO to having a   Please complete laminated checklist and leave on the computer keyboard for Provider, verbally inform provider if able.    For SCS Trial, RFA's or any sedation procedure: Have you been fasting? NA  If yes, for how long?    NA     For any sedation procedure:  Do you take any GLP-1 Antagonist? NO   If yes, when did you take your last dose? N/A    Are you taking any any blood thinners such as Coumadin, Warfarin, Jantoven, Pradaxa Xarelto, Eliquis, Edoxaban, Enoxaparin, Lovenox, Heparin, Arixtra, Fondaparinux, or Fragmin? OR Antiplatelet medication such as Plavix, Brilinta, or Effient? N/A  If yes, when did you take your last dose?   NA     Do you take aspirin?   NO  If cervical procedure, have you held aspirin for 6 days?   NA    Do you have any allergies to contrast dye, iodine, steroid and/or numbing medications?  NO     Are you currently taking antibiotics or have an active infection?  NO     Have you had a fever/elevated temperature within the past week? NO     Are you currently taking oral steroids? NO     Do you have a ? Yes     Are you pregnant or breastfeeding? No      Have you received any vaccines in the past 2 weeks? NO       Notify provider and RNs if systolic BP >170, diastolic BP >100, P >100 or O2 sats < 90%

## 2024-08-14 NOTE — NURSING NOTE
Discharge Information    IV Discontiued Time:  NA    Amount of Fluid Infused:  NA    Discharge Criteria = When patient returns to baseline or as per MD order    Consciousness:  Pt is fully awake    Circulation:  BP +/- 20% of pre-procedure level    Respiration:  Patient is able to breathe deeply    O2 Sat:  Patient is able to maintain O2 Sat >92% on room air    Activity:  Moves 4 extremities on command    Ambulation:  Patient is able to stand and walk or stand and pivot into wheelchair    Dressing:  Clean/dry or No Dressing    Notes:   Discharge instructions and AVS given to patient    Patient meets criteria for discharge?  YES    Admitted to PCU?  No    Responsible adult present to accompany patient home?  Yes    Signature/Title:    Alaina Horner RN  RN Care Coordinator  [facility]

## 2024-08-14 NOTE — PATIENT INSTRUCTIONS
[facility] Procedure Discharge Instructions    Today you saw:   Dr. Martine Jaimes     Your procedure:  Epidural steroid injection     Medications used:  Lidocaine (anesthetic) Dexamethasone (steroid)         Omnipaque (contrast)    Saline       Be cautious when walking as numbness and/or weakness in the legs may occur up to 6-8 hours after the procedure due to effect of the local anesthetic  Do not drive for 6 hours. The effect of the local anesthetic could slow your reflexes.   Avoid strenuous activity for the first 24 hours. You may resume your regular activities after that.   You may shower, however avoid swimming, tub baths or hot tubs for 24 hours following your procedure  You may have a mild to moderate increase in pain for several days following the injection.    You may use ice packs for 10-15 minutes, 3 to 4 times a day at the injection site for comfort  Do not use heat to painful areas for 6 to 8 hours. This will give the local anesthetic time to wear off and prevent you from accidentally burning your skin.  Unless you have been directed to avoid the use of anti-inflammatory medications (NSAIDS-ibuprofen, Aleve, Motrin), you may use these medications or Tylenol for pain control if needed.   Possible side effects of steroids that you may experience include flushing, elevated blood pressure, increased appetite, mild headaches and restlessness.  All of these symptoms will get better with time.  It may take up to 14 days for the steroid medication to start working although you may feel the effect as early as a few days after the procedure.   Follow up with Dr Meza in October as planned    If you experience any of the following, call the pain center line during work hours at [phone] or on-call physician after hours at [phone]:  -Fever over 100 degree F  -Swelling, bleeding, redness, drainage, warmth at the injection site  -Progressive weakness or numbness in your legs or arms  -Loss  of bowel or bladder function  -Unusual headache that is not relieved by Tylenol or your regular headache medication  -Unusual new onset of pain that is not improving

## 2024-08-15 ENCOUNTER — VIRTUAL VISIT (OUTPATIENT)
Dept: PHARMACY | Facility: CLINIC | Age: 54
End: 2024-08-15
Payer: COMMERCIAL

## 2024-08-15 DIAGNOSIS — F43.23 ADJUSTMENT DISORDER WITH MIXED ANXIETY AND DEPRESSED MOOD: ICD-10-CM

## 2024-08-15 DIAGNOSIS — G89.29 CHRONIC BILATERAL LOW BACK PAIN WITH BILATERAL SCIATICA: Primary | ICD-10-CM

## 2024-08-15 DIAGNOSIS — M54.42 CHRONIC BILATERAL LOW BACK PAIN WITH BILATERAL SCIATICA: Primary | ICD-10-CM

## 2024-08-15 DIAGNOSIS — E66.812 CLASS 2 OBESITY WITHOUT SERIOUS COMORBIDITY WITH BODY MASS INDEX (BMI) OF 35.0 TO 35.9 IN ADULT, UNSPECIFIED OBESITY TYPE: ICD-10-CM

## 2024-08-15 DIAGNOSIS — M54.41 CHRONIC BILATERAL LOW BACK PAIN WITH BILATERAL SCIATICA: Primary | ICD-10-CM

## 2024-08-15 DIAGNOSIS — Z78.9 TAKES DIETARY SUPPLEMENTS: ICD-10-CM

## 2024-08-15 PROCEDURE — 99607 MTMS BY PHARM ADDL 15 MIN: CPT | Mod: 95 | Performed by: PHARMACIST

## 2024-08-15 PROCEDURE — 99605 MTMS BY PHARM NP 15 MIN: CPT | Mod: 95 | Performed by: PHARMACIST

## 2024-08-15 RX ORDER — NALTREXONE HYDROCHLORIDE 50 MG/1
25-50 TABLET, FILM COATED ORAL
Qty: 30 TABLET | Refills: 2 | Status: SHIPPED | OUTPATIENT
Start: 2024-08-15

## 2024-08-15 NOTE — PATIENT INSTRUCTIONS
Recommendations from today's MTM visit:                                                      Start naltrexone 1/2 of the 50mg tablet at lunch and may increase to 1 tablet per day.    Great job on working with your !  Continue to work on adding back in exercise and diet.    Work on your diet: 1,200 to 1,500 kcal/day for women.  Activity:  try to aim for at least 30 minutes of moderate-intensity physical activity on most (preferably all) days of the week.    Improving Your Eating Habits  https://www.cdc.gov/healthy-weight-growth/losing-weight/improve-eating-habits.html    Apps that can be helpful for weight loss  Continue to use Lifetime raul!  Below are some other option.  Noom (subscription cost) - [email or web address]  My Fitness Pal (free version) - https://www.octoScope.CityVoter/    Difference between Noom and My Fitness Pal  - [email or web address]    Follow-up: Return in about 1 month (around 9/16/2024) for MTM Pharmacist Visit, video visit.    To schedule another MTM appointment, please call the clinic directly or you may call the MTM scheduling line at [phone] or toll-free at [phone].     My Clinical Pharmacist's contact information:                                                      It was a pleasure seeing you today!  Please feel free to contact me with any questions or concerns you have.      Petra Sheldon, PharmD BCPS  Medication Therapy Management Practitioner    It was great to speak with you today.  I value your experience and would be very thankful for your time with providing feedback on our clinic survey. You may receive a survey via email or text message in the next few days.

## 2024-09-12 NOTE — PROGRESS NOTES
Medication Therapy Management (MTM) Encounter    ASSESSMENT:                            Medication Adherence/Access: See below for considerations    Anxiety/Depression:   Stable, would benefit from continuing to work with therapist     Class II Obesity (BMI 35 to 39.9):   Needs improvement. Patient would benefit fromMake the following medication change(s): taking naltrexone in morning and starting slowly to avoid nausea .     Vaccines: Due for hepatitis B, influenza and COVID per ACIP/CDC Guidelines: Discussed receiving vaccines at local pharmacy, patient is agreeable to plan.     PLAN:                            Weight Management: start naltrexone 1/2 tablets in the morning.    Vaccines: Consider receiving hepatitis B, influenza, COVID vaccines at local pharmacy.     Follow-up: Return in about 25 days (around 10/11/2024) for MTM Pharmacist Visit.    SUBJECTIVE/OBJECTIVE:                          Veronica Messina is a 54 year old female seen for a follow-up visit from 8/15/24.       Reason for visit: weight management.    Allergies/ADRs: Reviewed in chart  Past Medical History: Reviewed in chart  Tobacco: She reports that she has never smoked. She has never been exposed to tobacco smoke. She has never used smokeless tobacco.  Alcohol: 1-3 beverages /month  Caffeine: coffee 1 cup 4-5 times a week, diet mountain dew per day    Medication Adherence/Access:   Hard to take meds at lunch  The patient fills medications at [place]: NO, fills medications at [facility].    Mental Health   Anxiety and Depression  Bupropion ER 150mg once daily  Fluoxetine 20mg once daily.     Patient reports lost  9 months ago, daughter had some issues last 2 years.  Seeing therapist, seeing improvement.  Feeling her mood is regulated.  High functioning anxiety. Working on better self care and self talk.  Today she expresses the difficulty of being single parent.  Side effects: no side effects.  No increase of anxiety on bupropion.     Weight  "Management   Bupropion  mg once daily  Naltrexone not currently taking    Caught a bad stomach virus and was sick in the last few weeks. Struggling taking naltrexone at lunch time.   Previously reported lost 35 lbs and reached plateau, took phentermine for about 4 months.  Goal is to lose 25-30lb.  In Menopause, night sweats stopped, facial hair growth.  Changing exercise did not work. Reports 2-5pm is her time of day she craves the most.  Nutrition/Eating Habits: She has not been able to work on her diet recently.   More sweets in diet lately.  She knows what to eat but having difficulty doing it. [employer], will be starting to use this. Trainer is Adam.  Exercise/Activity: Hard to work out with back pain. Upper body exercises.  Not able to get to gym either but did get there yesterday. History of the following \"gym 4-5 days/week, working with a  weekly, cardio, weights. At least 30-35 min cardio, 25-40 min varied weight work outs - upper and lower body and abs\"  Medications Tried/Failed:  Phentermine: failed  GLP-1 agonist: not covered by insurance     Initial Consult Weight: 211lb  Target weight: 175-180lb    Wt Readings from Last 4 Encounters:   05/08/24 216 lb 9 oz (98.2 kg)   03/07/24 214 lb 12.8 oz (97.4 kg)   05/31/23 219 lb 14.4 oz (99.7 kg)   10/25/22 229 lb (103.9 kg)     Estimated body mass index is 35.65 kg/m  as calculated from the following:    Height as of 5/8/24: 5' 5.35\" (1.66 m).    Weight as of 5/8/24: 216 lb 9 oz (98.2 kg).  No results found for: \"A1C\"    Today's Vitals: LMP 03/15/2019 (Approximate)   ----------------    I spent 14 minutes with this patient today. All changes were made via collaborative practice agreement with JACK Chavez CNP. A copy of the visit note was provided to the patient's provider(s).    A summary of these recommendations was sent via [employer].    Petra Sheldon, PharmD BCPS  Medication Therapy Management " Practitioner  Pharmacist    Telemedicine Visit Details  Type of service:  Video Conference via Zenedy  Start Time:  7:01am  End Time: 7:45 AM     Medication Therapy Recommendations  Class 2 obesity without serious comorbidity with body mass index (BMI) of 35.0 to 35.9 in adult, unspecified obesity type    Current Medication: naltrexone (DEPADE/REVIA) 50 MG tablet   Rationale: Patient forgets to take - Adherence - Adherence   Recommendation: Change Administration Time   Status: Patient Agreed - Adherence/Education

## 2024-09-16 ENCOUNTER — VIRTUAL VISIT (OUTPATIENT)
Dept: PHARMACY | Facility: CLINIC | Age: 54
End: 2024-09-16
Payer: COMMERCIAL

## 2024-09-16 DIAGNOSIS — F43.23 ADJUSTMENT DISORDER WITH MIXED ANXIETY AND DEPRESSED MOOD: Primary | ICD-10-CM

## 2024-09-16 DIAGNOSIS — E66.812 CLASS 2 OBESITY WITHOUT SERIOUS COMORBIDITY WITH BODY MASS INDEX (BMI) OF 35.0 TO 35.9 IN ADULT, UNSPECIFIED OBESITY TYPE: ICD-10-CM

## 2024-09-16 DIAGNOSIS — Z71.85 VACCINE COUNSELING: ICD-10-CM

## 2024-09-16 PROCEDURE — 99606 MTMS BY PHARM EST 15 MIN: CPT | Mod: 95 | Performed by: PHARMACIST

## 2024-09-16 NOTE — PATIENT INSTRUCTIONS
Recommendations from today's MTM visit:                                                      Weight Management: start naltrexone 1/2 tablets in the morning.    Vaccines: Consider receiving hepatitis B, influenza, COVID vaccines at local pharmacy.     Follow-up: Return in about 25 days (around 10/11/2024) for MTM Pharmacist Visit.    To schedule another MTM appointment, please call the clinic directly or you may call the MTM scheduling line at [phone] or toll-free at [phone].     My Clinical Pharmacist's contact information:                                                      It was a pleasure seeing you today!  Please feel free to contact me with any questions or concerns you have.      Petra Sheldon, PharmD BCPS  Medication Therapy Management Practitioner    It was great to speak with you today.  I value your experience and would be very thankful for your time with providing feedback on our clinic survey. You may receive a survey via email or text message in the next few days.

## 2024-09-30 ENCOUNTER — PATIENT OUTREACH (OUTPATIENT)
Dept: CARE COORDINATION | Facility: CLINIC | Age: 54
End: 2024-09-30
Payer: COMMERCIAL

## 2024-10-07 NOTE — PROGRESS NOTES
Medication Therapy Management (MTM) Encounter    ASSESSMENT:                            Medication Adherence/Access: No issues identified.    Anxiety/Depression:   Stable, would benefit from continuing to work with therapist     Class II Obesity (BMI 35 to 39.9):   Weight increase from last visit.  Patient would benefit from  increasing naltrexone to twice daily and working on lifestyle modifications with diet and exercise .     PLAN:                            Weight Management:   Change the naltrexone to 1/2 tablet or 25mg at lunch and dinner  Continue to work on getting exercise back in regimen and eating a healthy diet.    Great job getting your vaccines!    Follow-up: Return in 4 weeks (on 11/8/2024) for MTM Pharmacist Visit, video visit.    SUBJECTIVE/OBJECTIVE:                          Veronica Messina is a 54 year old female seen for a follow-up visit.       Reason for visit: weight management.    Allergies/ADRs: Reviewed in chart  Past Medical History: Reviewed in chart  Tobacco: She reports that she has never smoked. She has never been exposed to tobacco smoke. She has never used smokeless tobacco.  Alcohol: 1-3 beverages /month  Caffeine: coffee 1 cup 4-5 times a week, diet mountain dew per day    Medication Adherence/Access:   Hard to take meds at lunch  The patient fills medications at [facility]: NO, fills medications at [facility].    Mental Health   Anxiety and Depression  Bupropion ER 150mg once daily  Fluoxetine 20mg once daily.     Patient reports lost  9 months ago, daughter had some issues last 2 years. Reports last week stressful with daughter, but doing better mentally now. Seeing therapist.  Feeling her mood is regulated.  High functioning anxiety. Today she expresses the difficulty of being single parent.  Side effects: no side effects.  No increase of anxiety on bupropion.     Weight Management   Bupropion  mg once daily  Naltrexone 25mg after lunch.    Had a rough week with migraine and  "today migraine is better.  Went back to naltrexone to 1/2 tablet due to nausea.  Not feeling full and then starving at bedtime.    Previously reported lost 35 lbs and reached plateau, took phentermine for about 4 months.  Goal is to lose 25-30lb.  In Menopause, night sweats stopped, facial hair growth.  Changing exercise did not work. Reports 2-5pm is her time of day she craves the most.  Nutrition/Eating Habits: She reports she has days that are really well but other days crave sugar like halloween candy.  She knows what to eat but having difficulty doing it. 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  Exercise/Activity: [employer]. [place] is [place]. Not able to exercise this week due to headache and ankle pain. History of the following \"gym 4-5 days/week, working with a  weekly, cardio, weights. At least 30-35 min cardio, 25-40 min varied weight work outs - upper and lower body and abs\"  Medications Tried/Failed:  Phentermine: failed  GLP-1 agonist: not covered by insurance     Initial Consult Weight: 211lb  Target weight: 175-180lb  Home weight today 215lb    Wt Readings from Last 4 Encounters:   05/08/24 216 lb 9 oz (98.2 kg)   03/07/24 214 lb 12.8 oz (97.4 kg)   05/31/23 219 lb 14.4 oz (99.7 kg)   10/25/22 229 lb (103.9 kg)     Estimated body mass index is 35.65 kg/m  as calculated from the following:    Height as of 5/8/24: 5' 5.35\" (1.66 m).    Weight as of 5/8/24: 216 lb 9 oz (98.2 kg).  No results found for: \"A1C\"    Today's Vitals: LMP 03/15/2019 (Approximate)   ----------------    I spent 12 minutes with this patient today. All changes were made via collaborative practice agreement with JACK Chavez CNP. A copy of the visit note was provided to the patient's provider(s).    A summary of these recommendations was sent via [employer].    Petra Sheldon PharmD BCPS  Medication Therapy Management Practitioner  Pharmacist    Telemedicine Visit Details  The patient's " medications can be safely assessed via a telemedicine encounter.  Type of service:  Video Conference via [employer]  Originating Location (pt. Location): Home    Distant Location (provider location):  On-site  Joined the call at 10/11/2024, 8:04:05 am.  Left the call at 10/11/2024, 8:15:36 am.  You were on the call for 11 minutes 31 seconds .     Medication Therapy Recommendations  Class 2 obesity without serious comorbidity with body mass index (BMI) of 35.0 to 35.9 in adult, unspecified obesity type    Current Medication: naltrexone (DEPADE/REVIA) 50 MG tablet   Rationale: Dose too low - Dosage too low - Effectiveness   Recommendation: Increase Frequency   Status: Accepted per CPA

## 2024-10-11 ENCOUNTER — VIRTUAL VISIT (OUTPATIENT)
Dept: PHARMACY | Facility: CLINIC | Age: 54
End: 2024-10-11
Payer: COMMERCIAL

## 2024-10-11 DIAGNOSIS — F43.23 ADJUSTMENT DISORDER WITH MIXED ANXIETY AND DEPRESSED MOOD: ICD-10-CM

## 2024-10-11 DIAGNOSIS — E66.812 CLASS 2 OBESITY WITHOUT SERIOUS COMORBIDITY WITH BODY MASS INDEX (BMI) OF 35.0 TO 35.9 IN ADULT, UNSPECIFIED OBESITY TYPE: Primary | ICD-10-CM

## 2024-10-11 PROCEDURE — 99606 MTMS BY PHARM EST 15 MIN: CPT | Mod: 95 | Performed by: PHARMACIST

## 2024-10-11 RX ORDER — NALTREXONE HYDROCHLORIDE 50 MG/1
25 TABLET, FILM COATED ORAL 2 TIMES DAILY
Qty: 30 TABLET | Refills: 2 | Status: SHIPPED | OUTPATIENT
Start: 2024-10-11 | End: 2024-11-08

## 2024-10-11 RX ORDER — BUPROPION HYDROCHLORIDE 150 MG/1
150 TABLET ORAL EVERY MORNING
Qty: 30 TABLET | Refills: 2 | Status: SHIPPED | OUTPATIENT
Start: 2024-10-11

## 2024-10-11 NOTE — PATIENT INSTRUCTIONS
Recommendations from today's MTM visit:                                                      Weight Management:   Change the naltrexone to 1/2 tablet or 25mg at lunch and dinner  Continue to work on getting exercise back in regimen and eating a healthy diet.    Great job getting your vaccines!    Follow-up: Return in 4 weeks (on 11/8/2024) for MTM Pharmacist Visit, video visit.    To schedule another MTM appointment, please call the clinic directly or you may call the MTM scheduling line at [phone] or toll-free at [phone].     My Clinical Pharmacist's contact information:                                                      It was a pleasure seeing you today!  Please feel free to contact me with any questions or concerns you have.      Petra Sheldon, PharmD BCPS  Medication Therapy Management Practitioner    It was great to speak with you today.  I value your experience and would be very thankful for your time with providing feedback on our clinic survey. You may receive a survey via email or text message in the next few days.

## 2024-10-14 ENCOUNTER — TELEPHONE (OUTPATIENT)
Dept: PALLIATIVE MEDICINE | Facility: OTHER | Age: 54
End: 2024-10-14
Payer: COMMERCIAL

## 2024-10-14 NOTE — TELEPHONE ENCOUNTER
Patient request to change clinics due to location and ease of access.  Patient would like to transfer to [facility] if able.  Ok for patient's to determine their preference of provider and clinic location  Please transfer as able.

## 2024-10-14 NOTE — TELEPHONE ENCOUNTER
Good Morning,  this patient you have seen for an injection and it Dr Meza patient that has seen in the past too and would like to continue care at [facility],  was wondering if you could review if you would like a follow up for 60 min or is ok to do a follow up for 30min?     Please advise,  Moriah QIU

## 2024-10-14 NOTE — TELEPHONE ENCOUNTER
She can see either Paola Lyman CNP or myself in [place].  I have 45min new appointments and she has 60min new appointment.  Please schedule as a new patient.     Martine Jaimes MD

## 2024-10-18 NOTE — TELEPHONE ENCOUNTER
Left message for patient to call back and schedule 60 minute transfer of care appt with Paola Lyman

## 2024-10-31 ASSESSMENT — PAIN SCALES - PAIN ENJOYMENT GENERAL ACTIVITY SCALE (PEG)
AVG_PAIN_PASTWEEK: 3
PEG_TOTALSCORE: 2.67
INTERFERED_ENJOYMENT_LIFE: 2
INTERFERED_GENERAL_ACTIVITY: 3

## 2024-11-04 ENCOUNTER — OFFICE VISIT (OUTPATIENT)
Dept: PALLIATIVE MEDICINE | Facility: CLINIC | Age: 54
End: 2024-11-04
Payer: COMMERCIAL

## 2024-11-04 VITALS — HEART RATE: 65 BPM | DIASTOLIC BLOOD PRESSURE: 95 MMHG | OXYGEN SATURATION: 96 % | SYSTOLIC BLOOD PRESSURE: 155 MMHG

## 2024-11-04 DIAGNOSIS — M47.27 OSTEOARTHRITIS OF SPINE WITH RADICULOPATHY, LUMBOSACRAL REGION: ICD-10-CM

## 2024-11-04 DIAGNOSIS — M54.16 LUMBAR RADICULOPATHY: ICD-10-CM

## 2024-11-04 DIAGNOSIS — M54.41 CHRONIC RIGHT-SIDED LOW BACK PAIN WITH RIGHT-SIDED SCIATICA: Primary | ICD-10-CM

## 2024-11-04 DIAGNOSIS — G89.29 CHRONIC RIGHT-SIDED LOW BACK PAIN WITH RIGHT-SIDED SCIATICA: Primary | ICD-10-CM

## 2024-11-04 PROCEDURE — 99204 OFFICE O/P NEW MOD 45 MIN: CPT | Performed by: NURSE PRACTITIONER

## 2024-11-04 PROCEDURE — G2211 COMPLEX E/M VISIT ADD ON: HCPCS | Performed by: NURSE PRACTITIONER

## 2024-11-04 ASSESSMENT — PAIN SCALES - GENERAL: PAINLEVEL_OUTOF10: MILD PAIN (3)

## 2024-11-04 NOTE — PROGRESS NOTES
Medication Therapy Management (MTM) Encounter    ASSESSMENT:                            Medication Adherence/Access: No issues identified.    Anxiety/Depression:   stable, would benefit from continuing to work with therapist     Class II Obesity (BMI 35 to 39.9):   No recent weights, continue current medication and check weights in 1 month.  With taking naltrexone she does feel she is eating less.  Challenges with back pain and restarting exercise, she will continue to work with     Back pain:   Per pain note, looks like she was to be on gabapentin, would benefit from restarting at night to help with sleep and pain.  Consider changing fluoxetine to SNRI in the future as alternative option.  PLAN:                            Weight management:   great job getting back to the gym to work with your !  Continue naltrexone at bedtime, glad this is working!    Pain:  restart the gabapentin 300mg bedtime and after 3-7 days increase to 600mg if needed.    Follow-up: Return in about 24 days (around 12/2/2024) for MTM Pharmacist Visit, video visit.    SUBJECTIVE/OBJECTIVE:                          Veronica Messina is a 54 year old female seen for a follow-up visit from 10/11/2024.       Reason for visit: been a rough week with election with daughter.    Allergies/ADRs: Reviewed in chart  Past Medical History: Reviewed in chart  Tobacco: She reports that she has never smoked. She has never been exposed to tobacco smoke. She has never used smokeless tobacco.  Alcohol: 1-3 beverages /month  Caffeine: coffee 1 cup 4-5 times a week, diet mountain dew per day    Medication Adherence/Access:   Hard to take meds at lunch  The patient fills medications at [place]: NO, fills medications at [facility].    Back pain  Acetaminophen 1000mg twice daily as needed --taking mostly once a day  Also has Excedrin migraines for headaches  Gabapentin made her drowsy during the day, previously took at night  Pain location: gluteus and right  "side leg pain shoots down, having difficulty to sleeping at night and gabapentin does help a bit to help her sleep  Working to get L5-S1 TF FRANCIS approved by insurance, previously had this, she is also working to see neurosurgeon to discuss surgery  Working with Paola Lyman [facility], [facility]   Anxiety and Depression  Bupropion ER 150mg once daily  Fluoxetine 20mg once daily.     Patient reports lost  this past year, daughter had some issues last 2 years. Reports last week stressful with daughter and elections. Seeing therapist.  Feeling her mood is regulated and not seeing the sad dips and controlling things.  High functioning anxiety.   Side effects: no side effects.  No increase of anxiety on bupropion.     Weight Management   Bupropion  mg once daily  Naltrexone 50mg daily     Can not take naltrexone later in the day and now taking a night and helped not overeating in the morning and less at lunch time. Eating less for dinner and for other meals.  Continues to have sugar cravings.    Previously reported lost 35 lbs and reached plateau, took phentermine for about 4 months.  Goal is to lose 25-30lb.  In Menopause, night sweats stopped, facial hair growth.  Changing exercise did not work. Reports 2-5pm is her time of day she craves the most.  Nutrition/Eating Habits: She reports she is now eating less but still struggling with sugar cravings  Exercise/Activity: [employer].  is [place]. Now back to gym, having back pain and working through this.  This week got to the gym twice.  History of the following \"gym 4-5 days/week, working with a  weekly, cardio, weights. At least 30-35 min cardio, 25-40 min varied weight work outs - upper and lower body and abs\"  Medications Tried/Failed:  Phentermine: failed  GLP-1 agonist: not covered by insurance     Initial Consult Weight: 211lb  Target weight: 175-180lb  Home weight last visit  " "215lb  She did not check weight today, she wanted to wait on this    Wt Readings from Last 4 Encounters:   05/08/24 216 lb 9 oz (98.2 kg)   03/07/24 214 lb 12.8 oz (97.4 kg)   05/31/23 219 lb 14.4 oz (99.7 kg)   10/25/22 229 lb (103.9 kg)     Estimated body mass index is 35.65 kg/m  as calculated from the following:    Height as of 5/8/24: 5' 5.35\" (1.66 m).    Weight as of 5/8/24: 216 lb 9 oz (98.2 kg).  No results found for: \"A1C\"    Today's Vitals: LMP 03/15/2019 (Approximate)   ----------------      I spent 19 minutes with this patient today. All changes were made via collaborative practice agreement with JACK Chavez CNP. A copy of the visit note was provided to the patient's provider(s).    A summary of these recommendations was sent via [email or web address].    Petra Sheldon, PharmD BCPS  Medication Therapy Management Practitioner  Pharmacist      Telemedicine Visit Details  The patient's medications can be safely assessed via a telemedicine encounter.  Type of service:  Video Conference via [employer]  Originating Location (pt. Location): Home    Distant Location (provider location):  On-site  Joined the call at 11/8/2024, 7:30:45 am.  Left the call at 11/8/2024, 7:49:02 am.  You were on the call for 18 minutes 17 seconds .     Medication Therapy Recommendations  Lumbar radiculopathy   1 Current Medication: acetaminophen (TYLENOL) 500 MG tablet   Current Medication Sig: Take 1,000 mg by mouth every 8 hours as needed for mild pain.   Rationale: Synergistic therapy - Needs additional medication therapy - Indication   Recommendation: Start Medication - gabapentin 300 MG capsule   Status: Accepted per CPA   Identified Date: 11/8/2024 Completed Date: 11/8/2024            "

## 2024-11-04 NOTE — PROGRESS NOTES
"Date of Visit: 11/4/2024  Last visit: 5/12/24 (Der[facility]) for Med Spine Follow-up  Original Consult: 8/6/2024 for Med Spine Evaluation (Derrick)   Requested a transfer of care from [facility] to [facility].     History per Dr. Meza's previous note  Veronica Messina is a 54 year old female who presents with bilateral low back pain radiating down bilateral, right greater than left, lateral hip, lateral thigh, lateral calf, stopping above the ankle.  Patient notes numerous movements that aggravate the pain, improved with rolling onto the side that bothers her while laying in bed.  Pain is not associated with numbness, but is associated with weakness in the legs generally.  She has tried a prednisone burst which was significantly helpful.  Physical therapy has been minimally helpful.  Examination shows tenderness to palpation at the bilateral lower lumbar paraspinals, SI joint, bilateral greater trochanters, and significantly positive bilateral SI joint compression.  Otherwise SI joint provocative maneuvers are negative today.  We previously treated this pain is lumbar radiculopathy with gabapentin, which caused daytime drowsiness, so the patient converted to taking 600 mg nightly which is helpful to allow for sleep.  She tweaked her back recently and would like to pursue interventional management at this point.  We will proceed with bilateral L5 TFESI.  Will see her back 1 month postprocedure.  ____________________________________________________________    Interval History   - Low back pain for at least 2 years. Gradually worsened over time as she cared for her  who had terminal cancer. She has participated extensively in exercise programs with diminishing returns. Pain was intermittent historically, now has become constant.   - Had bilateral L5 TFESI on 8/14/24. She had short term relief - felt \"great\" when she left the procedure. 24 hours later, she had significant pain in her right gluteal region " "with radiation down her right leg again.  Back is about 70% of her pain, right leg is 30% of the pain - however the leg pain is what bothers her the most.   - Massage 2 weeks ago was somewhat helpful.   - \"I have learned to live with pain.\" Had to go though many painful procedures and had to push through. (IVF, fertility treatments, was an athlete.)  -  passed away from cancer 1 year ago. Her 15 year old daughter Maddi has struggled with mental health issues. She works full time as a  for a law firm.   - Gabapentin caused drowsiness, taking 2 capsules at night which is allowing patient to sleep somewhat better. Right leg pain interferes with sleep.  - Pain intensity currently is Mild Pain (3) on a scale of 0-10.     Current pain treatments:   Gabapentin 600mg at bedtime - Helpful    Other relevant meds:  Fluoxetine 20 mg daily    Previous medication treatments included:  Prednisone burst - helpful  Notes drowsiness from gabapentin, helpful with sleep     Other treatments have included:  Veronica Messina has not been seen at a pain clinic in the past.    PT: has done at least twice for this issue, most recently fall 2023, did HEP for a little -not helpful  Injections: bilateral L5 TFESI on 8/14/24.   Surgery: none  Other treatments: Stretching, massage gun, ice, heat    Medications and Allergies reviewed.  Allergies   Patient has no known allergies.  Medications   has a current medication list which includes the following prescription(s): aspirin-acetaminophen-caffeine, bupropion, cholecalciferol, cranberry-vitamin c-probiotic, fluoxetine, gabapentin, and naltrexone.      Medical History: any changes in medical history since they were last seen? No    Objective   Blood pressure (!) 155/95, pulse 65, last menstrual period 03/15/2019, SpO2 96%, not currently breastfeeding.  Constitutional: Well developed, NAD  Head: normocephalic. Atraumatic.   Eyes: no redness or jaundice noted   CV: warm, well " perfused extremities   Skin: no suspicious lesions or rashes   Psychiatric: mentation appears normal and affect full    Diagnostics:  EXAM: MR LUMBAR SPINE W/O CONTRAST 6/8/2024     T12-L1: Unremarkable appearance of the disc. No significant facet arthropathy. No significant canal or foraminal stenosis.      L1-L2: Unremarkable appearance of the disc. Mild bilateral facet arthropathy. No significant canal or foraminal stenosis.     L2-L3: Mild diffuse bulge. Mild disc height loss. Moderate bilateral facet arthropathy. No significant canal or foraminal stenosis.      L3-L4: Diffuse bulge. Mild disc height loss. Mild bilateral facet arthropathy. No significant canal or foraminal stenosis.     L4-L5: Diffuse bulge. Mild disc height loss. Moderate bilateral facet arthropathy. No significant canal or foraminal stenosis.     L5-S1: Posterior disc and osteophyte with contact of the exiting bilateral L5 nerve roots at the foramen. Mild disc height loss. Moderate bilateral facet arthropathy. No significant canal stenosis. Mild bilateral foraminal stenosis.                                                                      IMPRESSION:  1.  Mild lumbar spondylosis, worse at L5-S1. No high-grade canal or foraminal stenosis.  2.  Disc and osteophyte at L5-S1 with contact of the exiting L5 nerve roots at the foramen. Per the provided indication patient has bilateral L5 radiculopathy.    Assessment & Plan   Lumbar radiculopathy  Chronic right-sided low back pain with right-sided sciatica  Osteoarthritis of spine with radiculopathy, lumbosacral region    Reviewed MRI findings and response to FRANCIS. Advised that it would be reasonable to explore what surgical options may be available as pain is worsening over time and she is having diminished relief with exercises, had very temporary relief with FRANCIS (albeit 100% relief for 24 hours), and slight weakness in right leg.    Also would be reasonable to repeat right L5-S1 TF FRANCIS,  discussed diagnostic and therapeutic benefit.  She will follow-up with me after her FRANCIS unless she elects to pursue surgical intervention.     - Adult Neurosurgery  Referral  - PAIN INJECTION EVAL/TREAT/FOLLOW UP      Paola Lyman, CNP-BC, PMGT-BC, AP-PMN  [facility]

## 2024-11-04 NOTE — PATIENT INSTRUCTIONS
Repeat epidural at the right L5-S1 level. The  will contact you to set up your injection. Please follow up with me in 3-4 weeks to reassess symptoms and response to treatment (if you end up electing to take a surgical route, you do not need to follow-up with me.)   Neurosurgical consultation ordered, they will call you to schedule.    Keep doing your regular exercises.  _____________________________________________________  Pain Clinic telephone number: [phone]. Messages are returned Monday - Friday between 8 AM and 4:00 PM.  If you need a medication refilled, call the clinic or send a App in the Air message with the medication name and pharmacy 7 days in advance as it may take 3-4 days to process.  We do not refill medications on evenings or weekends.

## 2024-11-08 ENCOUNTER — VIRTUAL VISIT (OUTPATIENT)
Dept: PHARMACY | Facility: CLINIC | Age: 54
End: 2024-11-08
Payer: COMMERCIAL

## 2024-11-08 DIAGNOSIS — E66.812 CLASS 2 OBESITY WITHOUT SERIOUS COMORBIDITY WITH BODY MASS INDEX (BMI) OF 35.0 TO 35.9 IN ADULT, UNSPECIFIED OBESITY TYPE: Primary | ICD-10-CM

## 2024-11-08 DIAGNOSIS — M54.16 LUMBAR RADICULOPATHY: ICD-10-CM

## 2024-11-08 DIAGNOSIS — F43.23 ADJUSTMENT DISORDER WITH MIXED ANXIETY AND DEPRESSED MOOD: ICD-10-CM

## 2024-11-08 PROCEDURE — 99606 MTMS BY PHARM EST 15 MIN: CPT | Mod: 95 | Performed by: PHARMACIST

## 2024-11-08 PROCEDURE — 99607 MTMS BY PHARM ADDL 15 MIN: CPT | Mod: 95 | Performed by: PHARMACIST

## 2024-11-08 RX ORDER — NALTREXONE HYDROCHLORIDE 50 MG/1
50 TABLET, FILM COATED ORAL AT BEDTIME
Qty: 30 TABLET | Refills: 0 | Status: SHIPPED
Start: 2024-11-08

## 2024-11-08 RX ORDER — ACETAMINOPHEN 500 MG
1000 TABLET ORAL EVERY 8 HOURS PRN
COMMUNITY

## 2024-11-08 RX ORDER — GABAPENTIN 300 MG/1
300-600 CAPSULE ORAL AT BEDTIME
Qty: 180 CAPSULE | Refills: 0 | Status: SHIPPED | OUTPATIENT
Start: 2024-11-08

## 2024-11-08 NOTE — PATIENT INSTRUCTIONS
Recommendations from today's MTM visit:                                                      Weight management:   great job getting back to the gym to work with your !  Continue naltrexone at bedtime, glad this is working!    Pain:  restart the gabapentin 300mg bedtime and after 3-7 days increase to 600mg if needed.    Follow-up: Return in about 24 days (around 12/2/2024) for MTM Pharmacist Visit, video visit.    To schedule another MTM appointment, please call the clinic directly or you may call the MTM scheduling line at [phone] or toll-free at [phone].     My Clinical Pharmacist's contact information:                                                      It was a pleasure seeing you today!  Please feel free to contact me with any questions or concerns you have.      Petra Sheldon, PharmD [facility]  Medication Therapy Management Practitioner    It was great to speak with you today.  I value your experience and would be very thankful for your time with providing feedback on our clinic survey. You may receive a survey via email or text message in the next few days.

## 2024-12-12 DIAGNOSIS — G89.29 CHRONIC BILATERAL LOW BACK PAIN WITH LEFT-SIDED SCIATICA: Primary | ICD-10-CM

## 2024-12-12 DIAGNOSIS — M54.42 CHRONIC BILATERAL LOW BACK PAIN WITH LEFT-SIDED SCIATICA: Primary | ICD-10-CM

## 2024-12-12 NOTE — CONFIDENTIAL NOTE
NEUROSURGERY - NEW PREVISIT PLANNING    Referring Provider: Paola Lyman NP    OVN 11/4/2024   Reason For Visit: M54.41, G89.29 (ICD-10-CM) - Chronic right-sided low back pain with right-sided sciatica   M47.27 (ICD-10-CM) - Osteoarthritis of spine with radiculopathy, lumbosacral region          IMAGING STATUS/LOCATION DATE/TYPE   MRI PACS 06/08/2024  Lumbar  [facility]   CT N/A    XRAY     NOTES STATUS/LOCATION DATE/TYPE   Other specialist OVN: N/A    EMG N/A    INJECTION Encounters 11/22/2024, right L5 TFESI    08/14/2024, bilateral L5 TFESI   PHYSICAL THERAPY Encounters 2020, 2023   SURGERY N/A

## 2024-12-16 ENCOUNTER — ANCILLARY PROCEDURE (OUTPATIENT)
Dept: GENERAL RADIOLOGY | Facility: CLINIC | Age: 54
End: 2024-12-16
Attending: NEUROLOGICAL SURGERY
Payer: COMMERCIAL

## 2024-12-16 ENCOUNTER — TELEPHONE (OUTPATIENT)
Dept: NEUROSURGERY | Facility: CLINIC | Age: 54
End: 2024-12-16

## 2024-12-16 DIAGNOSIS — G89.29 CHRONIC BILATERAL LOW BACK PAIN WITH LEFT-SIDED SCIATICA: ICD-10-CM

## 2024-12-16 DIAGNOSIS — M54.42 CHRONIC BILATERAL LOW BACK PAIN WITH LEFT-SIDED SCIATICA: ICD-10-CM

## 2024-12-16 PROCEDURE — 72110 X-RAY EXAM L-2 SPINE 4/>VWS: CPT | Mod: TC | Performed by: INTERNAL MEDICINE

## 2024-12-16 NOTE — TELEPHONE ENCOUNTER
Message left for patient to call Central Scheduling for Imaging. Provider Dr. Lyons has requested imaging be done prior to the appointment on 12-20-24.    Without the images, the appointment will need to be reschedule.     Patient advised to call imaging directly at [phone]   no

## 2024-12-20 ENCOUNTER — PRE VISIT (OUTPATIENT)
Dept: NEUROSURGERY | Facility: CLINIC | Age: 54
End: 2024-12-20

## 2024-12-28 ENCOUNTER — HEALTH MAINTENANCE LETTER (OUTPATIENT)
Age: 54
End: 2024-12-28

## 2025-01-10 NOTE — PROGRESS NOTES
"Veronica is a 54 year old who is being evaluated via a billable video visit.      The patient has been notified of following:     \"This video visit will be conducted via a call between you and your physician/provider. We have found that certain health care needs can be provided without the need for an in-person physical exam.  This service lets us provide the care you need with a video conversation.  If a prescription is necessary we can send it directly to your pharmacy.  If lab work is needed we can place an order for that and you can then stop by our lab to have the test done at a later time.    Video visits are billed at different rates depending on your insurance coverage.  Please reach out to your insurance provider with any questions.    If during the course of the call the physician/provider feels a video visit is not appropriate, you will not be charged for this service.\"    Patient has given verbal consent for Video visit? Yes    How would you like to obtain your AVS? MyChart    If the video visit is dropped, the invitation should be resent by: Send to e-mail at: [email or web address]    Will anyone else be joining your video visit? No    I    Video-Visit Details    Type of service:  Video Visit    Originating Location (pt. Location): Home in [place]     Distant Location (provider location):   [facility]    Platform used for Video Visit: ClearStar    Video Start Time: 7:34 AM    Video End Time:8:30 AM    Joined 7:17 am, text to join 7:25 am, 7:33 am,       New Medical Weight Management Consult    PATIENT:  Veronica Messina   MRN:         [id number]   :         1970  SAVITA:         2025      Dear Bertha Lyles, APRN CNP,    I had the pleasure of seeing your patient, Veronica Messina. Full intake/assessment was done to determine barriers to weight loss success and develop a treatment plan. Veronica Messina is a 55 year old female interested in treatment of medical " "problems associated with excess weight. She has a height of 5' 6\", a weight of 214 lbs 0 oz, and the calculated Body mass index is 34.54 kg/m .    Assessment & Plan   Problem List Items Addressed This Visit       Class 1 obesity with serious comorbidity and body mass index (BMI) of 34.0 to 34.9 in adult, unspecified obesity type - Primary     Initial visit. Will try to start Zepbound. Seeing dietitian team later this week. Labs ordered. Recommend MTM follow-up in 2-3 mo for check. On discussion plan to continue naltrexone/bupropion off label.         Relevant Medications    tirzepatide-Weight Management (ZEPBOUND) 2.5 MG/0.5ML prefilled pen    tirzepatide-Weight Management (ZEPBOUND) 5 MG/0.5ML prefilled pen    Other Relevant Orders    Adult Sleep Evaluation & Management  Referral    Hemoglobin A1c    Vitamin D Screen    Hepatic panel    Chronic right-sided low back pain with right-sided sciatica     Discussed in clinic visit. Anticipate improvement of pain with weight loss.           Relevant Medications    tirzepatide-Weight Management (ZEPBOUND) 2.5 MG/0.5ML prefilled pen    tirzepatide-Weight Management (ZEPBOUND) 5 MG/0.5ML prefilled pen    Osteoarthritis of spine with radiculopathy, lumbosacral region     Discussed in clinic visit. Anticipate improvement of pain with weight loss.         Relevant Medications    tirzepatide-Weight Management (ZEPBOUND) 2.5 MG/0.5ML prefilled pen    tirzepatide-Weight Management (ZEPBOUND) 5 MG/0.5ML prefilled pen     Other Visit Diagnoses       Snoring        Relevant Orders    Adult Sleep Evaluation & Management  Referral             PROGRAM OVERVIEW  Reviewed options at [facility].   All questions were answered. Education provided on chronic disease management of obesity.      MEDICATIONS:  We discussed healthy habits to assist with weight loss. We reviewed medications associated with weight gain. We discussed the role of pharmacological " "agents in the treatment of obesity and the \"off-label\" use of medications in this practice. We reviewed medication that may assist with weight loss. Indications, contraindications, risks/benefits, and potential side effects were discussed.   Zepbound was prescribed. Discussed that medications must always be used together with lifestyle changes. Reviewed rationale for long term use of pharmacotherapy in chronic disease management for obesity.      AOM Considerations:  Phentermine:  On precharting reported to have already tried and failed SSRI consideration with fluoxetine  Topiramate:  CNS depressant with gabapentin, labs/hydration with meloxicam/Excedrin Migraine  GLP-1:  Monitor sugars. Discussed mechanism of action, common side effects, titration guidelines, and  discussed risks for pancreatitis/gallbladder problems/gastroparesis/low sugars/MTC/MEN2. Medication handout given in AVS - is  so knows they're not covered. Does report that there is a loop hole coverage for putting request through for the medications with prior medical history.   Naltrexone:  Already prescribed  Wellbutrin: Already prescribed   Metformin:  Labs and hydration with NSAIDs, pt reports no weight changes w/gabapentin  Contrave: Recreated with [facility] pharmacist team 2024 and reported no weight lost  Qsymia:     Referral from Dr. Lyons neurosurgery 12/20/2024 but no comments on any specific weight loss requirements visit, also working with [facility] pharmacy team       1/14/25 [facility] Mickey Sheldon:    Bupropion  mg once daily  Naltrexone 50mg daily      No weight loss. She feels very frustrated today.  She tells me that she met with the [facility] program and can get the injectable at a cheaper cost and she also has a weight management appointment to discuss options coming up.     Goal is to lose 25-30lb. Weights is contributing to back pain.  In Menopause/Jovita-menopause: night sweets, hair loss, weight gain.    Nutrition/Eating Habits: " She reports she is now eating and feels the medications are helping with cravings but not losing weight  Exercise/Activity: [employer].  is [employer] 3-4 days/week, working with a  weekly, cardio, weights. At least 30-35 min cardio, 25-40 min varied weight work outs - upper and lower body and abs  Medications Tried/Failed:  Phentermine: failed  GLP-1 agonist: not covered by insurance      Initial Consult Weight: 211lb  Target weight: 175-180lb  Home weight last visit  215lb  Weight today: 220.3 lb         PATIENT INSTRUCTIONS:  Pt Instructions:  Continue to work with your general therapist throughout this process as well!  Referral placed to sleep clinic. They will call to schedule.   Labs ordered.  Please call [phone] to set up a lab appt.    4.   Start Zepbound:  You'll start at 2.5 mg once weekly for 4 weeks.   If you're tolerating it well, increase to 5 mg once weekly thereafter until I see you again.    You can take over the counter famotidine (Pepcid) 20 mg once or twice daily as needed for nausea/heartburn.  5.   Keep your appointment with dietitian on 1/24/25.  6.  Follow-up with Petra with the MTM team. As discussed continue the Buproprion and Naltrexone for now.         Goals:  I recommend eating breakfast within an hour of waking up and eating every 4-6 hours during the day and try minimize snacking between meals. Prioritizing veggies and proteins for food choices is also recommended as medically appropriate.  Great job with exercising - please continue to invest in yourself with regular exercise. Continue to strive for a range for 150-300 minutes of exercise for weight maintenance and incorporating strength training twice-three times a week to help preserve muscle!      Follow up:    Call [phone] to schedule next visit in next available. Be in touch on Mychart for refills/concerns between visits      60 minutes spent on the date of the encounter doing chart review, history  "and exam, review test results, counseling, developing plan of care, documentation, and further activities as noted above.      She has the following co-morbidities:        1/16/2025     9:04 AM   --   I have the following health issues associated with obesity High Cholesterol    Infertility   I have the following symptoms associated with obesity Knee Pain    Infertility (Difficulty Getting Pregnant)    Depression    Back Pain    Fatigue    Hip Pain           1/16/2025     9:04 AM   Referring Provider   Please name the provider who referred you to Medical Weight Management  If you do not know, please answer \"I Don't Know\" Eloy     Working with Petra Borrego [facility] pharmacist 1/14/2025 discusses changing naltrexone/bupropion to either GLP-1 agonist or topiramate/phentermine but would need to monitor anxiety with phentermine use.        1/16/2025     9:04 AM   Weight History   How concerned are you about your weight? Very Concerned   I became overweight As an Adult   The following factors have contributed to my weight gain Mental Health Issues    Eating Too Much    Lack of Exercise    Stress   I have tried the following methods to lose weight Watching Portions or Calories    Exercise    Atkins-type Diet (Low Carb/High Protein)    Medications   My lowest weight since age 19 was 135   My highest weight since age 19 was 245   The most weight I have ever lost was (lbs) 60   I have the following family history of obesity/being overweight Many of my relatives are overweight   How has your weight changed over the last year? Gained   How many pounds? 10           1/16/2025     9:04 AM   Diet Recall Review with Patient   If you do eat breakfast, what types of food do you eat? Toast or a Banana, glass of juice   If you do eat lunch, what types of food do you typically eat? Rice, chicken, meat, cheese, fruit, pop, sweet treat   If you do eat supper, what types of food do you typically eat? cereal, chicken, salad, pasta   If " you do snack, what types of food do you typically eat? cheese and crackers, candy, chips   How many glasses of juice do you drink in a typical day? 2   How many of glasses of milk do you drink in a typical day? 1   If you do drink milk, what type? Skim   How many 8oz glasses of sugar containing drinks such as Epifanio-Aid/sweet tea do you drink in a day? 0   How many cans/bottles of sugar pop/soda/tea/sports drinks do you drink in a day? 0   How many cans/bottles of diet pop/soda/tea or sports drink do you drink in a day? 1   How often do you have a drink of alcohol? Monthly or Less   If you do drink, how many drinks might you have in a day? 1 or 2   Meals: with single parenting - can find not eating on a regular schedule. Not a huge breakfast person but does try for breakfast - tends to be cereal or toast. Lunch is most consistent. Dinner as a full meal may be more in the evenings.    Feels Wellbutrin/buproprion has reduced hunger. Can have concerns if not eating at night may not sleep well.    Does feel menopause related weight gain as well. Very frustrated with that experience - reports does have a therapist she works with - has been working through grief of losing  from cancer and parenting her daughter w/mental health issues but has been talking with her about things as well now for body image and some negative self talk as well. Main concern is associated health concerns with family hx of DM II/etc, heart disease as well.  Feels gets sucked in to 'crap' with commercials and tick tock. Pain management provider recommending weight reduction to help with back pains as well. Influencing sleep at night as well.    Reports  was the cook of the family previously and they ate well. After heart attack and health issues really shifted everything as well. Big learning curve with his passing for cooking and house maintenance.           1/16/2025     9:04 AM   Eating Habits   Generally, my meals include foods  like these bread, pasta, rice, potatoes, corn, crackers, sweet dessert, pop, or juice A Few Times a Week   Generally, my meals include foods like these fried meats, brats, burgers, french fries, pizza, cheese, chips, or ice cream Once a Week   Eat fast food (like McDonalds, Burger [employer], Taco Bell) Less Than Weekly   Eat at a buffet or sit-down restaurant Less Than Weekly   Eat most of my meals in front of the TV or computer Almost Everyday   Often skip meals, eat at random times, have no regular eating times Less Than Weekly   Rarely sit down for a meal but snack or graze throughout A Few Times a Week   Eat extra snacks between meals A Few Times a Week   Eat most of my food at the end of the day Almost Everyday   Eat in the middle of the night or wake up at night to eat Never   Eat extra snacks to prevent or correct low blood sugar Never   Eat to prevent acid reflux or stomach pain Never   Worry about not having enough food to eat Never   I eat when I am depressed A Few Times a Week   I eat when I am stressed A Few Times a Week   I eat when I am bored A Few Times a Week   I eat when I am anxious A Few Times a Week   I eat when I am happy or as a reward A Few Times a Week   I feel hungry all the time even if I just have eaten Less Than Weekly   Feeling full is important to me Less Than Weekly   I finish all the food on my plate even if I am already full Almost Everyday   I can't resist eating delicious food or walk past the good food/smell Never   I eat/snack without noticing that I am eating Never   I eat when I am preparing the meal Never   I eat more than usual when I see others eating Once a Week   I have trouble not eating sweets, ice cream, cookies, or chips if they are around the house A Few Times a Week   I think about food all day Never   What foods, if any, do you crave? Sweets/Candy/Chocolate   Please list any other foods you crave? None           1/16/2025     9:04 AM   Amount of Food   I feel out of  control when eating Never   I eat a large amount of food, like a loaf of bread, a box of cookies, a pint/quart of ice cream, all at once Monthly   I eat a large amount of food even when I am not hungry Monthly   I eat rapidly Never   I eat alone because I feel embarrassed and do not want others to see how much I have eaten Weekly - states misunderstood question - tends to eat alone but not out of embarressment   I eat until I am uncomfortably full Weekly   I feel bad, disgusted, or guilty after I overeat Almost Everyday     I make myself vomit what I have eaten or use laxatives to get rid of food. Never     Denies prior hx or personal concerns.    Binge Eating Screening:  Screen negative as noted below, recommend monitoring and let us know if future  Binge eating episodes are associated with 3 or more of the following:    Eating until uncomfortably full. Every now and then but with holiday gatherings  Eating large amounts when not physically hungry. no  Eating much more rapidly than usual.  Not outside of a time constraint  Eating alone due to being embarassed by the amount eaten. no  Feeling disgusted, depressed, or guilty after overeating. Every now and then if eating out with friends with peer pressure for dessert and on clean plate club, maybe once a quarter   If patient answers yes to 3 of the above questions and answered yes to frequency, distress of eating behavior and avoids using compensatory behaviors, refer for binge eating assessment.            1/16/2025     9:04 AM   Activity/Exercise History   How much of a typical 12 hour day do you spend sitting? Half the Day   How much of a typical 12 hour day do you spend lying down? Less Than Half the Day   How much of a typical day do you spend walking/standing? Half the Day   How many hours (not including work) do you spend on the TV/Video Games/Computer/Tablet/Phone? 1 Hour or Less   How many times a week are you active for the purpose of exercise? 2-3 Times a  Week   What keeps you from being more active? Pain    Lack of Time    Too tired   How many total minutes do you spend doing some activity for the purpose of exercising when you exercise? More Than 30 Minutes     Exercise is doing weights - may go to the gym does arms/legs/etc. Likes treadmill does 28-30 min on the treadmill. And does have a  as well, Adam - at least weekly.    PAST MEDICAL HISTORY:  Past Medical History:   Diagnosis Date    Infertility, female     Migraine            1/16/2025     9:04 AM   Work/Social History Reviewed With Patient   My employment status is Full-Time   My job is Benefits and    How much of your job is spent on the computer or phone? 75%   How many hours do you spend commuting to work daily? 1.5   What is your marital status? Single   Who do you live with? Daughter   Who does the food shopping? Me       Social History     Tobacco Use    Smoking status: Never     Passive exposure: Never    Smokeless tobacco: Never   Vaping Use    Vaping status: Never Used   Substance Use Topics    Alcohol use: No     Comment: occ    Drug use: No            1/16/2025     9:04 AM   Mental Health History Reviewed With Patient   Have you ever been physically or sexually abused? No   How often in the past 2 weeks have you felt little interest or pleasure in doing things? Not at all   Over the past 2 weeks how often have you felt down, depressed, or hopeless? More Than Half the Days     Working with a therapist - yes        1/16/2025     9:04 AM   Questions Reviewed With Patient   How ready are you to make changes regarding your weight? Number 1 = Not ready at all to make changes up to 10 = very ready. 7   How confident are you that you can change? 1 = Not confident that you will be successful making changes up to 10 = very confident. 7     Factors influencing confidence rating sounds can be influenced with menopause         1/16/2025     9:04 AM   Sleep History Reviewed  With Patient   How many hours do you sleep at night? 6   STOP-BANG score 4, endorses loud snoring    MEDICATIONS:   Current Outpatient Medications   Medication Sig Dispense Refill    acetaminophen (TYLENOL) 500 MG tablet Take 1,000 mg by mouth every 8 hours as needed for mild pain.      aspirin-acetaminophen-caffeine (EXCEDRIN MIGRAINE) 250-250-65 MG tablet Take 1 tablet by mouth daily as needed for headaches.      buPROPion (WELLBUTRIN XL) 150 MG 24 hr tablet Take 1 tablet (150 mg) by mouth every morning. 30 tablet 2    cholecalciferol (VITAMIN D3) 25 mcg (1000 units) capsule Take 1 capsule by mouth daily      Cranberry-Vitamin C-Probiotic (AZO CRANBERRY PO) Take 1 capsule by mouth daily      cyclobenzaprine (FLEXERIL) 10 MG tablet Take 1 tablet (10 mg) by mouth 2 times daily as needed for muscle spasms. 28 tablet 0    FLUoxetine (PROZAC) 20 MG capsule Take 1 capsule (20 mg) by mouth daily 90 capsule 3    gabapentin (NEURONTIN) 300 MG capsule Take 1-2 capsules (300-600 mg) by mouth at bedtime. Take 1 capsule at nighttime only for the first 3-7 days, and if tolerated well without dizziness or drowsiness, increase to the 600mg at night 180 capsule 0    Multiple Vitamins-Minerals (HAIR SKIN & NAILS PO) Take 1 each by mouth daily.      naltrexone (DEPADE/REVIA) 50 MG tablet Take 1 tablet (50 mg) by mouth at bedtime. 30 tablet 0    tirzepatide-Weight Management (ZEPBOUND) 2.5 MG/0.5ML prefilled pen Inject 0.5 mLs (2.5 mg) subcutaneously every 7 days. 2 mL 1    tirzepatide-Weight Management (ZEPBOUND) 5 MG/0.5ML prefilled pen Inject 0.5 mLs (5 mg) subcutaneously every 7 days. 2 mL 4       ALLERGIES:   No Known Allergies    ROS:    HEENT  H/O glaucoma:  no  Cardiovascular  CAD:   no  Palpitations:   no  HTN:    no  Gastrointestinal  GERD:   no  Constipation:   No  Gastroparesis:  no  Liver Dz:   no  H/O Pancreatitis:  no  H/O Gallbladder Dz: no  Psychiatric  Anxiety:   some  Depression:  some  Bipolar:  no  H/O ETOH/Drug  abuse: no  H/O eating disorder: no  Endocrine  PMH/FMH of MTC or MEN2:  No, some thyroid issues but no cancer  Neurologic:  H/O seizures:   no  Headaches:  Yes - sleep related   Memory Impairment:  Some with menopause    H/O kidney stones:  no  Kidney disease:  no  Current birth control:  Menopause     LABS/RECORDS REVIEWED:  Vitamin D, Total (25-Hydroxy)   Date Value Ref Range Status   03/07/2024 19 (L) 20 - 50 ng/mL Final     Comment:     mild to moderate deficiency     TSH   Date Value Ref Range Status   03/07/2024 0.97 0.30 - 4.20 uIU/mL Final   05/17/2019 0.98 0.40 - 4.00 mU/L Final     Sodium   Date Value Ref Range Status   03/07/2024 140 135 - 145 mmol/L Final     Comment:     Reference intervals for this test were updated on 09/26/2023 to more accurately reflect our healthy population. There may be differences in the flagging of prior results with similar values performed with this method. Interpretation of those prior results can be made in the context of the updated reference intervals.    07/16/2020 141 133 - 144 mmol/L Final     Potassium   Date Value Ref Range Status   03/07/2024 4.3 3.4 - 5.3 mmol/L Final   10/24/2022 4.3 3.4 - 5.3 mmol/L Final   07/16/2020 4.2 3.4 - 5.3 mmol/L Final     Chloride   Date Value Ref Range Status   03/07/2024 104 98 - 107 mmol/L Final   10/24/2022 107 94 - 109 mmol/L Final   07/16/2020 108 94 - 109 mmol/L Final     Carbon Dioxide   Date Value Ref Range Status   07/16/2020 26 20 - 32 mmol/L Final     Carbon Dioxide (CO2)   Date Value Ref Range Status   03/07/2024 28 22 - 29 mmol/L Final   10/24/2022 27 20 - 32 mmol/L Final     Anion Gap   Date Value Ref Range Status   03/07/2024 8 7 - 15 mmol/L Final   10/24/2022 5 3 - 14 mmol/L Final   07/16/2020 7 3 - 14 mmol/L Final     Glucose   Date Value Ref Range Status   03/07/2024 89 70 - 99 mg/dL Final   10/24/2022 107 (H) 70 - 99 mg/dL Final   07/16/2020 91 70 - 99 mg/dL Final     Urea Nitrogen   Date Value Ref Range Status    03/07/2024 16.8 6.0 - 20.0 mg/dL Final   10/24/2022 11 7 - 30 mg/dL Final   07/16/2020 12 7 - 30 mg/dL Final     Creatinine   Date Value Ref Range Status   03/07/2024 0.66 0.51 - 0.95 mg/dL Final   07/16/2020 0.71 0.52 - 1.04 mg/dL Final     GFR Estimate   Date Value Ref Range Status   03/07/2024 >90 >60 mL/min/1.73m2 Final   07/16/2020 >90 >60 mL/min/[1.73_m2] Final     Comment:     Non  GFR Calc  Starting 12/18/2018, serum creatinine based estimated GFR (eGFR) will be   calculated using the Chronic Kidney Disease Epidemiology Collaboration   (CKD-EPI) equation.       Calcium   Date Value Ref Range Status   03/07/2024 9.4 8.6 - 10.0 mg/dL Final   07/16/2020 9.2 8.5 - 10.1 mg/dL Final     ALT   Date Value Ref Range Status   03/07/2024 9 0 - 50 U/L Final     Comment:     Reference intervals for this test were updated on 6/12/2023 to more accurately reflect our healthy population. There may be differences in the flagging of prior results with similar values performed with this method. Interpretation of those prior results can be made in the context of the updated reference intervals.     07/16/2020 22 0 - 50 U/L Final     AST   Date Value Ref Range Status   03/07/2024 18 0 - 45 U/L Final     Comment:     Reference intervals for this test were updated on 6/12/2023 to more accurately reflect our healthy population. There may be differences in the flagging of prior results with similar values performed with this method. Interpretation of those prior results can be made in the context of the updated reference intervals.   07/16/2020 20 0 - 45 U/L Final     Cholesterol   Date Value Ref Range Status   10/24/2022 182 <200 mg/dL Final   07/16/2020 161 <200 mg/dL Final     HDL Cholesterol   Date Value Ref Range Status   07/16/2020 52 >49 mg/dL Final     Direct Measure HDL   Date Value Ref Range Status   10/24/2022 64 >=50 mg/dL Final     LDL Cholesterol Calculated   Date Value Ref Range Status   10/24/2022  "101 (H) <=100 mg/dL Final   07/16/2020 96 <100 mg/dL Final     Comment:     Desirable:       <100 mg/dl     Triglycerides   Date Value Ref Range Status   10/24/2022 85 <150 mg/dL Final   07/16/2020 63 <150 mg/dL Final     Hemoglobin   Date Value Ref Range Status   03/07/2024 12.4 11.7 - 15.7 g/dL Final   05/03/2018 11.4 (L) 11.7 - 15.7 g/dL Final           BP Readings from Last 6 Encounters:   01/14/25 110/62   12/20/24 127/78   11/22/24 128/74   11/04/24 (!) 155/95   08/14/24 132/84   08/06/24 (!) 146/73       Pulse Readings from Last 6 Encounters:   01/14/25 77   12/20/24 89   11/22/24 65   11/04/24 65   08/14/24 65   08/06/24 67       PHYSICAL EXAM:  Ht 5' 6\" (1.676 m)   Wt 214 lb (97.1 kg)   LMP 03/15/2019 (Approximate)   BMI 34.54 kg/m    GENERAL: Healthy, alert and no distress  EYES: Eyes grossly normal to inspection.    RESP: No audible wheeze, cough, or visible cyanosis.  No increased work of breathing.    SKIN: Visible skin clear. No significant rash, abnormal pigmentation or lesions.  NEURO: Mentation and speech appropriate for age.  PSYCH: Mentation appears normal, affect normal/bright, judgement and insight intact, normal speech and appearance well-groomed.      Sincerely,      Deb Mills PA-C         "

## 2025-01-13 NOTE — PROGRESS NOTES
Medication Therapy Management (MTM) Encounter    ASSESSMENT:                            Medication Adherence/Access: See below for considerations.    Anxiety/Depression:   Not interested in changing fluoxetine to SNRI for chronic pain. would benefit from continuing to work with therapist     Class II Obesity (BMI 35 to 39.9):    Needs improvement. Patient would benefit from changing naltrexone and bupropion to either GLP-1 agonist or topiramate/phentermine with monitoring anxiety with phentermine use.  She would like to discuss options with weight management team first before making any changes today.    Back pain:   Getting anticholinergic side effects of fatigue and dry mouth from cyclobenzaprine consider changing to methocarbamol.  See above for SNRI recommendation.    PLAN:                            Pain:  ask about changing the cyclobenzaprine to the methocarbamol  Consider changing fluoxetine to SNRI in the future as alternative option.    Weight Management:  Future options:    topiramate and phentermine (with monitoring anxiety)  Semaglutide--compounded formulation, see below      Compound Semaglutide at [facility]  [facility] semaglutide during the time of Wegovy/Ozempic national shortage. Semaglutide is the generic name of Wegovy (for Weight Management) and Ozempic (for Type 2 Diabetes). [facility] is following the highest standards for sterility and compounding practices. Compounding of semaglutide is legal for as long as Wegovy/Ozempic are on the FDA's drug shortage list. When Wegovy/Ozempic are taken off the FDA's shortage list, compounding semaglutide will no longer be available/legal. Pharmacy can provide 1 last refill up to 1 month from resolution of shortage date on FDA drug shortage list. When this occurs, patients will have to turn to acquiring Wegovy via its available manufactured pen, look into alternative weight loss  "medication(s), or stop the medication. Due to high demand of compound semaglutide, orders may take 1-2 weeks to obtain from time of prescribing.     As with any weight loss medication(s), there is a risk of weight regain should you stop semaglutide. It is important to be aware of this risk should you stop compounded semaglutide with no plans to transition back to an alternative injectable option. The use of semaglutide as a weight management medication, is intended for long term use.     Obtaining Medication From Pharmacy:   Automated call will contact patient when pharmacy has received RX. [facility] to speak directly to team member prior to shipping medication to verify patient name, product, shipping, and cost. During this call, pharmacy technician will verify if needles/syringes will be needed and can be added to order for $5 additional cost per 10 unit syringe packs. Vials of compounded semaglutide [facility] to your home in a refrigerated box. The vial you will be given is a multi-use vial, meaning that you will use the same vial during the next 28 days for each of your injections. Use a new syringe for each injection. The syringe that will be used to draw up your dose is a \"unit\" syringe, meaning your dose will have an associated \"mg\" and \"units\". Please see your prescription and the below information to verify the dose you should be injecting in UNITS prior to injection.     Storage:  Keep your medication stored in the refrigerator. The vials are to be discarded 28 days after opening (even if there is remaining medication in the vial).     Do not pre-fill syringes from the multi-use vial for future use. Sterility cannot be verified. You should only be drawing up the amount needed for the injection that day, then placing vial back into refrigerator for storage for next injection.     Common Side Effects:  Side effect profile is the same as " Wegovy. Monitor for nausea, diarrhea, constipation, headache, indigestion, tiredness (fatigue), stomach upset or abdominal pain. Less commonly, semaglutide can cause low blood sugar (symptoms: shaky, dizzy, sweaty, agitation). Please reach out to the care team should you feel like this is occurring. It is important to ensure that you are eating consistent meals and not skipping meals. Ensure you are getting at least 64 oz water daily. If any side effects become unmanageable, contact the care team immediately. See Wegovy package insert for rare but serious side effects, contraindications and warnings.     Dosing:  Each week you will have the opportunity/option to increase your dose. However, therapy is individualized for each patient. The below is a general guide should someone increase dosage of compound semaglutide each month.   Week 1-4: Inject 0.25 mg (5 units) subcutaneously once weekly  Week 5-8: If tolerating, increase to 0.5 mg (10 units) once weekly  Week 9-12: If tolerating, increase to 1 mg (20 units) once weekly  Week 13-15: If tolerating, increase to 1.5 mg (30 units) OR 1.7 mg (34 units) once weekly (dosing depending on what was prescribed by provider)  Week 16-19: If tolerating, increase to 2 mg (40 units) once weekly  Week 20 & on: If tolerating, increase to 2.4 mg (48 units) or 2.5 mg (50 units) once weekly (dosing depending on what was prescribed by provider)  *If you are having nausea or other side effects to where you are hesitant to move up to the next dose, stay at the same dose you are on for an additional 2-4 weeks to see if side effect(s) improve/resolve. Make sure to take this time to hydrate and utilizing smaller more consistent meals, such as 4-6 small meals per day.  It may be advantageous to stay at the same dose if you are seeing good efficacy (both on and off the scale) and having minimal/manageable side effects. If you do not have additional refills on that dose's prescription, please  reach out to the clinic.    Mg to Unit Conversion Chart for Reference:         Administration:  Follow the instructions to fill the syringe with medicine. Instructions can be accessed at [email or web address] or by scanning this QR code-    Follow the instructions to inject your medication. Instructions can be accessed at [email or web address]  or by scanning this QR code-      Syringe Disposal:   Place the used syringe in a sharps container. You can buy a sharps container at your local pharmacy.   If you don't have a sharps container, you can use a plastic detergent container with a lid.   Visit Learning About Safe Needle Use and Disposal ([employer]) and safeneedledisposal.org for more information.     Cost:   $230 per month for doses 1 mg or less (one 1 mL vial), $370 per month for doses higher than 1 mg (two 1 mL vials)   Optional $5 per 10 pack unit needle/syringe, no additional RX required    [facility] Phone:  [phone]    States to which [facility] is able to ship to: [place], [place], [place], [place], [place], [place]     Follow-up:1/21 with [facility] Management Clinic and 3/4 Dr. Barragan Return for [facility] Pharmacist Visit, as needed, please contact us for future visit.    SUBJECTIVE/OBJECTIVE:                          Veronica Messina is a 54 year old female seen for a follow-up visit from 11/8/2024.       Reason for visit: frustrating with weight.  Was told by surgeon that weight loss would help her back pain and delay surgery  Allergies/ADRs: Reviewed in chart  Past Medical History: Reviewed in chart  Tobacco: She reports that she has never smoked. She has never been exposed to tobacco smoke. She has never used smokeless tobacco.  Alcohol: 1-3 beverages /month  Caffeine: coffee 1 cup 4-5 times a week, diet mountain dew per day    Medication Adherence/Access:   Hard to take meds at lunch so been taking in morning and evening  She is burden with number of pills she is taking, use to be on  no pills  The patient fills medications at [facility]: NO, fills medications at [facility].    Back pain  Cyclobenzaprine 10mg bedtime   Acetaminophen 1000mg twice daily as needed   Also has Excedrin migraines for headaches  Gabapentin 300mg bedtime  Meloxicam 15mg every bedtime     Side effects: drowsiness, fatigue during the day needing 45 minute naps, dry mouth   Pain location: gluteus and right side leg pain shoots down, having difficulty to sleeping at night and gabapentin does help a bit to help her sleep  Working with neurosurgeon, she states if she can lose weight it will help delay surgery.  Working with Paola Lyman, [facility], [facility]   Anxiety and Depression  Bupropion ER 150mg once daily  Fluoxetine 20mg once daily.     Patient reports lost  this past year, daughter had some issues last 2 years.  Seeing therapist.  Feeling her mood is regulated and not seeing the sad dips and controlling things.  High functioning anxiety.   Weight is causing depression and would like to do something about her weight.   Side effects: no side effects.  No increase of anxiety on bupropion.     Weight Management   Bupropion  mg once daily  Naltrexone 50mg daily     No weight loss. She feels very frustrated today.  She tells me that she met with the [facility] program and can get the injectable at a cheaper cost and she also has a weight management appointment to discuss options coming up.    Goal is to lose 25-30lb. Weights is contributing to back pain.  In Menopause/Jovita-menopause: night sweets, hair loss, weight gain.    Nutrition/Eating Habits: She reports she is now eating and feels the medications are helping with cravings but not losing weight  Exercise/Activity: [employer].  is [employer] 3-4 days/week, working with a  weekly, cardio, weights. At least 30-35 min cardio, 25-40 min varied weight work outs - upper and lower body and  "abs  Medications Tried/Failed:  Phentermine: failed  GLP-1 agonist: not covered by insurance     Initial Consult Weight: 211lb  Target weight: 175-180lb  Home weight last visit  215lb  Weight today: 220.3 lb    Wt Readings from Last 4 Encounters:   01/14/25 220 lb 4.8 oz (99.9 kg)   12/20/24 219 lb (99.3 kg)   05/08/24 216 lb 9 oz (98.2 kg)   03/07/24 214 lb 12.8 oz (97.4 kg)     Estimated body mass index is 35.56 kg/m  as calculated from the following:    Height as of 12/20/24: 5' 6\" (1.676 m).    Weight as of this encounter: 220 lb 4.8 oz (99.9 kg).  No results found for: \"A1C\"      Supplements:   Vitamin D 1000 units every day  Cranberry every day to help prevent UTIs  Hair Skin and Nails every day for hair loss  No reported issues at this time.      Today's Vitals: /62   Pulse 76   Wt 220 lb 4.8 oz (99.9 kg)   LMP 03/15/2019 (Approximate)   SpO2 99%   BMI 35.56 kg/m    ----------------    I spent 30 minutes with this patient today. All changes were made via collaborative practice agreement with JACK Chavez CNP. A copy of the visit note was provided to the patient's provider(s).    A summary of these recommendations was given to the patient.    Petra Sheldon, PharmD BCPS  Medication Therapy Management Practitioner  Pharmacist     Medication Therapy Recommendations  Class 2 obesity without serious comorbidity with body mass index (BMI) of 35.0 to 35.9 in adult, unspecified obesity type   1 Current Medication: naltrexone (DEPADE/REVIA) 50 MG tablet   Current Medication Sig: Take 1 tablet (50 mg) by mouth at bedtime.   Rationale: Condition refractory to medication - Ineffective medication - Effectiveness   Recommendation: Change Medication - SEMAGLUTIDE   Status: Contact Provider - Awaiting Response   Identified Date: 1/14/2025         Lumbar radiculopathy   1 Current Medication: cyclobenzaprine (FLEXERIL) 10 MG tablet   Current Medication Sig: Take 1 tablet (10 mg) by mouth 2 times daily as " needed for muscle spasms.   Rationale: Undesirable effect - Adverse medication event - Safety   Recommendation: Change Medication - methocarbamol 750 MG tablet   Status: Contact Provider - Awaiting Response   Identified Date: 1/14/2025

## 2025-01-14 ENCOUNTER — OFFICE VISIT (OUTPATIENT)
Dept: PHARMACY | Facility: CLINIC | Age: 55
End: 2025-01-14
Payer: COMMERCIAL

## 2025-01-14 VITALS
HEART RATE: 76 BPM | SYSTOLIC BLOOD PRESSURE: 110 MMHG | OXYGEN SATURATION: 99 % | WEIGHT: 220.3 LBS | BODY MASS INDEX: 35.56 KG/M2 | DIASTOLIC BLOOD PRESSURE: 62 MMHG

## 2025-01-14 DIAGNOSIS — M54.16 LUMBAR RADICULOPATHY: ICD-10-CM

## 2025-01-14 DIAGNOSIS — E66.812 CLASS 2 OBESITY WITHOUT SERIOUS COMORBIDITY WITH BODY MASS INDEX (BMI) OF 35.0 TO 35.9 IN ADULT, UNSPECIFIED OBESITY TYPE: Primary | ICD-10-CM

## 2025-01-14 DIAGNOSIS — F43.23 ADJUSTMENT DISORDER WITH MIXED ANXIETY AND DEPRESSED MOOD: ICD-10-CM

## 2025-01-14 PROCEDURE — 99605 MTMS BY PHARM NP 15 MIN: CPT | Performed by: PHARMACIST

## 2025-01-14 PROCEDURE — 99607 MTMS BY PHARM ADDL 15 MIN: CPT | Performed by: PHARMACIST

## 2025-01-14 NOTE — PATIENT INSTRUCTIONS
Recommendations from today's MTM visit:                                                      Pain:  ask about changing the cyclobenzaprine to the methocarbamol  Consider changing fluoxetine to SNRI in the future as alternative option.    Weight Management:  Future options:    topiramate and phentermine (with monitoring anxiety)  Semaglutide--compounded      Compound Semaglutide at [facility]  [facility] is offering compounded semaglutide during the time of Wegovy/Ozempic national shortage. Semaglutide is the generic name of Wegovy (for Weight Management) and Ozempic (for Type 2 Diabetes). [place] [facility] is following the highest standards for sterility and compounding practices. Compounding of semaglutide is legal for as long as Wegovy/Ozempic are on the FDA's drug shortage list. When Wegovy/Ozempic are taken off the FDA's shortage list, compounding semaglutide will no longer be available/legal. Pharmacy can provide 1 last refill up to 1 month from resolution of shortage date on FDA drug shortage list. When this occurs, patients will have to turn to acquiring Wegovy via its available manufactured pen, look into alternative weight loss medication(s), or stop the medication. Due to high demand of compound semaglutide, orders may take 1-2 weeks to obtain from time of prescribing.     As with any weight loss medication(s), there is a risk of weight regain should you stop semaglutide. It is important to be aware of this risk should you stop compounded semaglutide with no plans to transition back to an alternative injectable option. The use of semaglutide as a weight management medication, is intended for long term use.     Obtaining Medication From Pharmacy:   Automated call will contact patient when pharmacy has received RX. Patient must call the [facility] back to speak directly to team member prior to shipping medication to verify patient name, product, shipping,  "and cost. During this call, pharmacy technician will verify if needles/syringes will be needed and can be added to order for $5 additional cost per 10 unit syringe packs. Vials of compounded semaglutide will be mailed from the compounding pharmacy to your home in a refrigerated box. The vial you will be given is a multi-use vial, meaning that you will use the same vial during the next 28 days for each of your injections. Use a new syringe for each injection. The syringe that will be used to draw up your dose is a \"unit\" syringe, meaning your dose will have an associated \"mg\" and \"units\". Please see your prescription and the below information to verify the dose you should be injecting in UNITS prior to injection.     Storage:  Keep your medication stored in the refrigerator. The vials are to be discarded 28 days after opening (even if there is remaining medication in the vial).     Do not pre-fill syringes from the multi-use vial for future use. Sterility cannot be verified. You should only be drawing up the amount needed for the injection that day, then placing vial back into refrigerator for storage for next injection.     Common Side Effects:  Side effect profile is the same as Wegovy. Monitor for nausea, diarrhea, constipation, headache, indigestion, tiredness (fatigue), stomach upset or abdominal pain. Less commonly, semaglutide can cause low blood sugar (symptoms: shaky, dizzy, sweaty, agitation). Please reach out to the care team should you feel like this is occurring. It is important to ensure that you are eating consistent meals and not skipping meals. Ensure you are getting at least 64 oz water daily. If any side effects become unmanageable, contact the care team immediately. See Wegovy package insert for rare but serious side effects, contraindications and warnings.     Dosing:  Each week you will have the opportunity/option to increase your dose. However, therapy is individualized for each patient. The " below is a general guide should someone increase dosage of compound semaglutide each month.   Week 1-4: Inject 0.25 mg (5 units) subcutaneously once weekly  Week 5-8: If tolerating, increase to 0.5 mg (10 units) once weekly  Week 9-12: If tolerating, increase to 1 mg (20 units) once weekly  Week 13-15: If tolerating, increase to 1.5 mg (30 units) OR 1.7 mg (34 units) once weekly (dosing depending on what was prescribed by provider)  Week 16-19: If tolerating, increase to 2 mg (40 units) once weekly  Week 20 & on: If tolerating, increase to 2.4 mg (48 units) or 2.5 mg (50 units) once weekly (dosing depending on what was prescribed by provider)  *If you are having nausea or other side effects to where you are hesitant to move up to the next dose, stay at the same dose you are on for an additional 2-4 weeks to see if side effect(s) improve/resolve. Make sure to take this time to hydrate and utilizing smaller more consistent meals, such as 4-6 small meals per day.  It may be advantageous to stay at the same dose if you are seeing good efficacy (both on and off the scale) and having minimal/manageable side effects. If you do not have additional refills on that dose's prescription, please reach out to the clinic.    Mg to Unit Conversion Chart for Reference:         Administration:  Follow the instructions to fill the syringe with medicine. Instructions can be accessed at [email or web address] or by scanning this QR code-    Follow the instructions to inject your medication. Instructions can be accessed at [email or web address]  or by scanning this QR code-      Syringe Disposal:   Place the used syringe in a sharps container. You can buy a sharps container at your local pharmacy.   If you don't have a sharps container, you can use a plastic detergent container with a lid.   Visit Learning About Safe Needle Use and Disposal ([employer]) and safeneedledisposal.org for more information.     Cost:   $230 per  month for doses 1 mg or less (one 1 mL vial), $370 per month for doses higher than 1 mg (two 1 mL vials)   Optional $5 per 10 pack unit needle/syringe, no additional RX required    [facility] Phone:  [phone]    States to which [facility] is able to ship to: [place], [place], [place], [place], [place], [place]     Follow-up:1/21 with Weight Management Clinic and 3/4 Dr. Barragan Return for MTM Pharmacist Visit, as needed, please contact us for future visit.    To schedule another MTM appointment, please call the clinic directly or you may call the MTM scheduling line at [phone] or toll-free at [phone].     My Clinical Pharmacist's contact information:                                                      It was a pleasure seeing you today!  Please feel free to contact me with any questions or concerns you have.      Petra Sheldon, PharmD BCPS  Medication Therapy Management Practitioner    It was great to speak with you today.  I value your experience and would be very thankful for your time with providing feedback on our clinic survey. You may receive a survey via email or text message in the next few days.

## 2025-01-16 ASSESSMENT — SLEEP AND FATIGUE QUESTIONNAIRES
HOW LIKELY ARE YOU TO NOD OFF OR FALL ASLEEP WHILE SITTING AND READING: SLIGHT CHANCE OF DOZING
HOW LIKELY ARE YOU TO NOD OFF OR FALL ASLEEP WHILE SITTING AND TALKING TO SOMEONE: WOULD NEVER DOZE
HOW LIKELY ARE YOU TO NOD OFF OR FALL ASLEEP WHILE SITTING INACTIVE IN A PUBLIC PLACE: WOULD NEVER DOZE
HOW LIKELY ARE YOU TO NOD OFF OR FALL ASLEEP WHEN YOU ARE A PASSENGER IN A CAR FOR AN HOUR WITHOUT A BREAK: WOULD NEVER DOZE
HOW LIKELY ARE YOU TO NOD OFF OR FALL ASLEEP IN A CAR, WHILE STOPPED FOR A FEW MINUTES IN TRAFFIC: WOULD NEVER DOZE
HOW LIKELY ARE YOU TO NOD OFF OR FALL ASLEEP WHILE LYING DOWN TO REST IN THE AFTERNOON WHEN CIRCUMSTANCES PERMIT: MODERATE CHANCE OF DOZING
HOW LIKELY ARE YOU TO NOD OFF OR FALL ASLEEP WHILE SITTING QUIETLY AFTER LUNCH WITHOUT ALCOHOL: WOULD NEVER DOZE
HOW LIKELY ARE YOU TO NOD OFF OR FALL ASLEEP WHILE WATCHING TV: SLIGHT CHANCE OF DOZING

## 2025-01-20 VITALS — HEIGHT: 66 IN | BODY MASS INDEX: 34.39 KG/M2 | WEIGHT: 214 LBS

## 2025-01-21 ENCOUNTER — TELEPHONE (OUTPATIENT)
Dept: SURGERY | Facility: CLINIC | Age: 55
End: 2025-01-21

## 2025-01-21 ENCOUNTER — VIRTUAL VISIT (OUTPATIENT)
Dept: SURGERY | Facility: CLINIC | Age: 55
End: 2025-01-21
Payer: COMMERCIAL

## 2025-01-21 DIAGNOSIS — G89.29 CHRONIC RIGHT-SIDED LOW BACK PAIN WITH RIGHT-SIDED SCIATICA: ICD-10-CM

## 2025-01-21 DIAGNOSIS — E66.811 CLASS 1 OBESITY WITH SERIOUS COMORBIDITY AND BODY MASS INDEX (BMI) OF 34.0 TO 34.9 IN ADULT, UNSPECIFIED OBESITY TYPE: Primary | ICD-10-CM

## 2025-01-21 DIAGNOSIS — M54.41 CHRONIC RIGHT-SIDED LOW BACK PAIN WITH RIGHT-SIDED SCIATICA: ICD-10-CM

## 2025-01-21 DIAGNOSIS — M47.27 OSTEOARTHRITIS OF SPINE WITH RADICULOPATHY, LUMBOSACRAL REGION: ICD-10-CM

## 2025-01-21 DIAGNOSIS — R06.83 SNORING: ICD-10-CM

## 2025-01-21 PROCEDURE — 98003 SYNCH AUDIO-VIDEO NEW HI 60: CPT | Performed by: PHYSICIAN ASSISTANT

## 2025-01-21 PROCEDURE — G2211 COMPLEX E/M VISIT ADD ON: HCPCS | Performed by: PHYSICIAN ASSISTANT

## 2025-01-21 NOTE — PATIENT INSTRUCTIONS
Nice to talk with you today. Below is the plan discussed.-  Deb Mills PA-C     Pt Instructions:  Continue to work with your general therapist throughout this process as well!  Referral placed to sleep clinic. They will call to schedule.   Labs ordered.  Please call [phone] to set up a lab appt.    4.   Start Zepbound:  You'll start at 2.5 mg once weekly for 4 weeks.   If you're tolerating it well, increase to 5 mg once weekly thereafter until I see you again.    You can take over the counter famotidine (Pepcid) 20 mg once or twice daily as needed for nausea/heartburn.  5.   Keep your appointment with dietitian on 1/24/25.  6.  Follow-up with Petra with the MTM team. As discussed continue the Buproprion and Naltrexone for now.         Goals:  I recommend eating breakfast within an hour of waking up and eating every 4-6 hours during the day and try minimize snacking between meals. Prioritizing veggies and proteins for food choices is also recommended as medically appropriate.  Great job with exercising - please continue to invest in yourself with regular exercise. Continue to strive for a range for 150-300 minutes of exercise for weight maintenance and incorporating strength training twice-three times a week to help preserve muscle!      Follow up:    Call [phone] to schedule next visit in next available. Be in touch on Mychart for refills/concerns between visits    Zepbound (Tirzepatide)    Zepbound (Tirzepatide): is an injectable prescription medicine recently FDA approved for adults with obesity or overweight with an additional condition affected by their weight.      It is given as a shot once a week. It activates the body's receptors for GIP (glucose-dependent insulinotropic polypeptide) and GLP-1 (glucagon-like peptide-1) receptor, two naturally occurring hormones that help tell the brain that you are full. It also works is by slowing down how quickly food leaves your stomach.     You will start  to feel dooley more quickly, notice portion changes and eat less often between meals.  Patients are advised to eat slowly and purposefully. Give yourself less portions. You may find after starting this medication you have a new point of fullness. Maintain consistent eating schedule with meals +/- snacks and get in good hydration.    Dosing:  Initial dose: 2.5 mg injected subcutaneously once weekly for 4 weeks  Further dose changes can include: increase to 5 mg once weekly for 4 weeks, then 7.5 mg once weekly for 4 weeks, then 10 mg once weekly for 4 weeks then 12.5 mg once weekly for 4 weeks, then 15 mg (maximum dose) once weekly.    Dose changes are based on how you are tolerating the current dose, what benefits you are seeing at the current dose and if improvement in effectiveness of the drug is needed. The goal is to find the dose that works best for you with the least amount of side effects. You may find you reach this at a lower dose than the maximum dose.     Side effects: Common side effects include nausea, Heartburn,diarrhea, constipation. This is worse when starting the medication and with dose dose escalation.  It does improve with time. Stay adequately hydrated and eat small portions to decrease the risk of side effects.     The risk of pancreatitis (inflammation of the pancreas) has been associated with this type of medication, but is very rare.  If you have had pancreatitis in the past, this medication may not be for you. If you experience persistent severe abdominal pain (sometimes radiating to the back potentially accompanied by vomiting and have a fever), stop the medication and contact your provider immediately for assessment. They will do a blood test to check for pancreatitis.       Caution:   Tirzepatide (Zepbound, Mounjaro) May decrease effectiveness of your oral birth control while starting and with dose adjustments.  Use backup forms of birth control if necessary.      For any questions or  concerns please send a [employer] message to our team or call our weight management call center at [phone] during regular business hours.     There is a small chance you may have some low blood sugar after taking the medication.   The signs of low blood sugar are:  Weakness  Shaky   Hungry  Sweating  Confusion      See below for ways to treat low blood sugar without adding in lots of extra calories.      Treating Low Blood Sugar    If you have symptoms of low blood sugar (sweating, shaking, dizzy, confused) eat 15 grams of carbs and wait 15 minutes:    Glucose Tabs are best for sugars under 70 -  Dex4 or BD Glucose tablets are good, you will need to take 3-4 of these to equal 15 grams.     One small box of raisins  4 oz fruit juice box or   cup fruit juice  1 small apple  1 small banana    cup canned fruit in water    English muffin or a slice of bread with jelly   1 low fat frozen waffle with sugar-free syrup    cup cottage cheese with   cup frozen or fresh blueberries  1 cup skim or low-fat milk    cup whole grain cereal  4-6 crackers such as Triscuits      This medication is usually not covered by insurance and can be quite expensive. Sometimes a prior authorization is required, which may take up to 1-2 weeks for an insurance company to make a decision if they will cover the medication. Please be patient, you will be notified after a decision has been made.    Contact the nurse via [employer] [employer] or call [phone] if you have any questions or concerns. (Do not stop taking it if you don't think it's working. For some people it works without them knowing it.)     In order to get refills of this or any medication we prescribe you must be seen in the medical weight mgmt clinic every 2-4 months.      Using Your Mounjaro/Zepbound Pen  Medicine (tirzepatide)    Storing your pens  Store your pens in the refrigerator until you are ready to use them. Don't let them freeze.  Your pen may be stored at room temperature for 21  days or fewer. Just make sure the temperature doesn't get higher than 86 or lower than 46 degrees Fahrenheit.   Protect the pens from light.  Never use any pens that have .    Check your pen before use:  The liquid in the pen window should be clear or light yellow. Bubbles are okay to see.   Do not use the pen if you can see the window contains any specks, is cloudy, or has changed color.  Make sure you have the medication and dose your health care provider prescribed.    Getting ready to inject:   1. Wash and dry your hands well.  2. Make sure the counter you use to place your supplies on is clean.  3. Make sure your injection time will not be interrupted by children or pets.  4. Have alcohol wipes or alcohol and cotton balls available to clean the injection site.   5. Choose your injection site. It can be on your stomach, back of upper arms, or upper legs. Remember to change your injection site each time you inject. Try to be at least 1 inch away from the previous one. Stay at least 1 inch away from your belly button.       Inject your dose:  1. Each pen is prefilled with one dose of medicine.    2. Pull off the grey cap at the bottom of the pen. Throw it in the trash. Do not put the cap back on the pen.   3. Make sure your pen is in the locked position. You will find the lock at the top of the pen.   4. Clean the injection site with alcohol.   5. Place the clear part of the pen against your skin. Unlock the pen by turning it to the unlock  position at the top.   6. Press and hold the purple injection button at the top of the pen. Listen for 2 clicks. The last click tells you the injection has been completed.   7. This injection is given 1 day a week. If you need to change the day you inject, there needs to be at least 3 days or 72 hours between the two doses.  8. If you miss a dose, you may take the dose within 4 days or 96 hours of the missed dose.   9. Your injection may be best tolerated if given at  night.     Disposing of your pens:  Dispose of your pens in a puncture-resistant container (hard plastic bottle) or Sharps container.  Check with the county you live in on how to dispose of the container.  Do not recycle the container with used pens.       Of note, you may not be able to  your medication right away. It may require a prior authorization from your insurance company. This may take a week or more.

## 2025-01-21 NOTE — TELEPHONE ENCOUNTER
"Prior Authorization Retail Medication Request    Medication/Dose: tirztirzepatide-Weight Management (ZEPBOUND) 5 MG/0.5ML prefilled pen  tirzepatide-Weight Management (ZEPBOUND) 5 MG/0.5ML prefilled pen    Diagnosis and ICD code (if different than what is on RX):  [E66.811, Z68.34] [M54.41, G89.29] [M47.27]   New/renewal/insurance change PA/secondary ins. PA:  Previously Tried and Failed:  Diets, exercise, life style changes  Rationale:  Class 1 obesity with serious comorbidity and body mass index (BMI) of 34.0 to 34.9 in adult,    1/20/2025  3:21 PM   Vital Signs    Weight (LB) 214 lb    Height 5' 6\"    BMI (Calculated) 34.54      AOM Considerations:  Phentermine:   On precharting reported to have already tried and failed SSRI consideration with fluoxetine  Topiramate:     CNS depressant with gabapentin, labs/hydration with meloxicam/Excedrin Migraine  GLP-1:             Monitor sugars. Discussed mechanism of action, common side effects, titration guidelines, and  discussed risks for pancreatitis/gallbladder problems/gastroparesis/low sugars/MTC/MEN2. Medication handout given in AVS - is  so knows they're not covered. Does report that there is a loop hole coverage for putting request through for the medications with prior medical history.              Naltrexone:      Already prescribed  Wellbutrin: Already prescribed             Metformin:       Labs and hydration with NSAIDs, pt reports no weight changes w/gabapentin  Contrave: Recreated with [facility] pharmacist team 2024 and reported no weight lost  Qsymia:             Insurance   Primary: [employer] [[id number]] - [employer] OPEN ACCESS [[id number]]   Insurance ID:  [id number]      Secondary (if applicable):PA  Insurance ID:  If applicable    Pharmacy Information (if different than what is on RX)  Name:  [facility]  Phone:  [phone]  Fax:[phone]    Clinic Information  Preferred routing pool for dept communication:     "

## 2025-01-21 NOTE — Clinical Note
Met Martin,  On discussion - she actually reports GLP's may be covered potentially with what sounds like an appeal that she's describing so sending for zepbound. She wants to continue wellbutrin/naltrexone. Ordering monitoring LFTs with naltrexone. Asking her to f/up w/you in 2-3 mo for check  Deb Mills, PA-C

## 2025-01-21 NOTE — ASSESSMENT & PLAN NOTE
Initial visit. Will try to start Zepbound. Seeing dietitian team later this week. Labs ordered. Recommend MTM follow-up in 2-3 mo for check. On discussion plan to continue naltrexone/bupropion off label.

## 2025-01-23 NOTE — TELEPHONE ENCOUNTER
Central Prior Authorization Team - Phone: [phone]     PA Initiation    Medication: ZEPBOUND 5 MG/0.5ML SC SOAJ  Insurance Company: [employer] - Phone [phone] Fax [phone]  Pharmacy Filling the Rx: [id number] - [place], [place] - [address]  Filling Pharmacy Phone: [phone]  Filling Pharmacy Fax:    Start Date: 1/23/2025

## 2025-01-27 NOTE — TELEPHONE ENCOUNTER
Central Prior Authorization Team - Phone: [phone]     Prior Authorization Follow Up    Received additional questions via CMM. Completed and submitted . Waiting for determination.    PA DEPT  will follow up again on status in 1 to 2 business days.

## 2025-01-28 NOTE — TELEPHONE ENCOUNTER
Central Prior Authorization Team - Phone: [phone]     PRIOR AUTHORIZATION DENIED    Medication: ZEPBOUND 5 MG/0.5ML SC SOAJ  Insurance Company: [employer] - Phone [phone] Fax [phone]  Denial Date: 1/27/2025    Denial Reason(s): drugs used for weight loss are excluded from patients insurance plan and not eligible for PA/appeals.      Appeal Information:   drugs used for weight loss are excluded from patients insurance plan and not eligible for PA/appeals.    Patient Notified: NO  Unfortunately, we cannot call the patient with denials because we do not know what next steps the MD will take nor can we give medical advice, please notify the patient of what they are to expect for the continuation of their therapy from the provider.

## 2025-01-29 ENCOUNTER — ANCILLARY PROCEDURE (OUTPATIENT)
Dept: MAMMOGRAPHY | Facility: CLINIC | Age: 55
End: 2025-01-29
Attending: NURSE PRACTITIONER
Payer: COMMERCIAL

## 2025-01-29 DIAGNOSIS — Z12.31 VISIT FOR SCREENING MAMMOGRAM: ICD-10-CM

## 2025-01-29 PROCEDURE — 77063 BREAST TOMOSYNTHESIS BI: CPT | Mod: TC | Performed by: RADIOLOGY

## 2025-01-29 PROCEDURE — 77067 SCR MAMMO BI INCL CAD: CPT | Mod: TC | Performed by: RADIOLOGY

## 2025-02-04 ENCOUNTER — HOSPITAL ENCOUNTER (OUTPATIENT)
Dept: MAMMOGRAPHY | Facility: CLINIC | Age: 55
Discharge: HOME | End: 2025-02-04
Attending: NURSE PRACTITIONER
Payer: COMMERCIAL

## 2025-02-04 ENCOUNTER — HOSPITAL ENCOUNTER (OUTPATIENT)
Dept: ULTRASOUND IMAGING | Facility: CLINIC | Age: 55
Discharge: HOME | End: 2025-02-04
Attending: NURSE PRACTITIONER
Payer: COMMERCIAL

## 2025-02-04 DIAGNOSIS — R92.8 ABNORMAL MAMMOGRAM: ICD-10-CM

## 2025-02-04 PROCEDURE — 76642 ULTRASOUND BREAST LIMITED: CPT | Mod: RT

## 2025-02-04 PROCEDURE — 77065 DX MAMMO INCL CAD UNI: CPT | Mod: RT

## 2025-02-04 PROCEDURE — 77061 BREAST TOMOSYNTHESIS UNI: CPT | Mod: RT

## 2025-02-13 ASSESSMENT — SLEEP AND FATIGUE QUESTIONNAIRES
HOW LIKELY ARE YOU TO NOD OFF OR FALL ASLEEP WHILE SITTING AND READING: SLIGHT CHANCE OF DOZING
HOW LIKELY ARE YOU TO NOD OFF OR FALL ASLEEP WHILE SITTING INACTIVE IN A PUBLIC PLACE: SLIGHT CHANCE OF DOZING
HOW LIKELY ARE YOU TO NOD OFF OR FALL ASLEEP WHEN YOU ARE A PASSENGER IN A CAR FOR AN HOUR WITHOUT A BREAK: HIGH CHANCE OF DOZING
HOW LIKELY ARE YOU TO NOD OFF OR FALL ASLEEP WHILE SITTING AND TALKING TO SOMEONE: WOULD NEVER DOZE
HOW LIKELY ARE YOU TO NOD OFF OR FALL ASLEEP WHILE SITTING QUIETLY AFTER LUNCH WITHOUT ALCOHOL: SLIGHT CHANCE OF DOZING
HOW LIKELY ARE YOU TO NOD OFF OR FALL ASLEEP WHILE LYING DOWN TO REST IN THE AFTERNOON WHEN CIRCUMSTANCES PERMIT: HIGH CHANCE OF DOZING
HOW LIKELY ARE YOU TO NOD OFF OR FALL ASLEEP IN A CAR, WHILE STOPPED FOR A FEW MINUTES IN TRAFFIC: WOULD NEVER DOZE
HOW LIKELY ARE YOU TO NOD OFF OR FALL ASLEEP WHILE WATCHING TV: MODERATE CHANCE OF DOZING

## 2025-02-14 NOTE — PROGRESS NOTES
Outpatient Sleep Medicine Consultation:      Name: Veronica Messina MRN# [id number]   Age: 55 year old YOB: 1970     Date of Consultation: February 17, 2025  Consultation is requested by: Deb Mills PA-C  [address]  [place] [address] Deb Mills  Primary care provider: Bertha Lyles       Reason for Sleep Consult:     Veronica Messina is sent by Deb Mills for a sleep consultation regarding 1. Class 1 obesity with serious comorbidity and body mass index (BMI) of 34.0 to 34.9 in adult, unspecified obesity type; 2. Snoring    Patient s Reason for visit  Veronica Messina main reason for visit: (Patient-Rptd) Snoring commented on by family and ongoing tiredness during the day  Patient states problem(s) started: (Patient-Rptd) Several years ago  Veronica Messina's goals for this visit: (Patient-Rptd) Identify factors contributing to what is noted in question #1 an develop a treatment plan         Assessment and Plan:     Summary Sleep Diagnoses:  1. Snoring (Primary)  2. Excessive daytime sleepiness  Comorbid Diagnoses:  3. Obesity (BMI 30-39.9)  4. Adjustment disorder with mixed anxiety and depressed mood    Patient is being evaluated for Obstructive Sleep Apnea (LILA) given loud disruptive snoring, excessive daytime sleepiness (mild, ESS 11), unrefreshing/unrestful sleep, obesity, age over 50, family history.  We reviewed complications of untreated sleep apnea if present and patient is interested in treatment.  Home sleep testing is appropriate for this patient given lack of significant comorbidity and is preferred by insurance, orders placed today.  Discussed if sleep apnea seen on study CPAP therapy is considered gold standard for treatment, she is open to starting on this.  Her  before he passed and her mom currently is on CPAP so she is aware of what this entails.  She is comfortable with me placing empiric auto CPAP orders to expedite treatment set up.  I will plan  to send [employer] message of study results once finalized and see her back after starting on CPAP to review compliance and treatment outcomes.  Education materials provided in AVS.  Lastly, encouraged her to try taking her gabapentin a couple hours before bed to try to prevent any pain that can affect her sleep and less grogginess in the morning.  - HST-Home Sleep Apnea Test - Noxturnal Returnable; Future         History of Present Illness:     Veronica Messina is a 55-year-old female with obesity, migraine, anxiety/depression who presents to clinic today after referral from weight management for evaluation of suspected LILA.    Past Sleep Evaluations:None    SLEEP-WAKE SCHEDULE:     Work/School Days: Patient goes to school/work: (Patient-Rptd) Yes   Usually gets into bed at (Patient-Rptd) 11:00 pm  Takes patient about (Patient-Rptd) 30-40 minutes on average to fall asleep  Has trouble falling asleep (Patient-Rptd) 4 nights per week  Wakes up in the middle of the night (Patient-Rptd) 1 time.  Wakes up due to (Patient-Rptd) Pain;External stimuli (bed partner, pets, noise, etc);Use the bathroom  She has trouble falling back asleep (Patient-Rptd) 2 times a week.   It usually takes (Patient-Rptd) 15 minutes to get back to sleep  Patient is usually up at (Patient-Rptd) 5:45 am  Uses alarm: (Patient-Rptd) Yes    Weekends/Non-work Days/All Other Days:  Usually gets into bed at (Patient-Rptd) 10:00 pm   Takes patient about (Patient-Rptd) 30-40 minutes to fall asleep  Patient is usually up at (Patient-Rptd) 5:45 am  Uses alarm: (Patient-Rptd) Yes    Sleep Need  Patient estimates she gets (Patient-Rptd) 6 hours sleep on average   Patient thinks she needs about (Patient-Rptd) 8 hours sleep    Veronica Messina prefers to sleep in this position(s): (Patient-Rptd) Side   Patient states they do the following activities in bed: (Patient-Rptd) Read;Watch TV;Use phone, computer, or tablet    Naps  Patient takes a purposeful nap  "(Patient-Rptd) 2 times a week and naps are usually (Patient-Rptd) 30 minutes in duration - \"we have a sick room at work where we can nap at\"  She feels better after a nap: (Patient-Rptd) Yes  She dozes off unintentionally (Patient-Rptd) 0 days per week  Patient has had a driving accident or near-miss due to sleepiness/drowsiness: (Patient-Rptd) No      SLEEP DISRUPTIONS:    Breathing/Snoring  Patient snores:(Patient-Rptd) Yes   Other people complain about her snoring: (Patient-Rptd) Yes - daughter complains, can occasionally hear her on different level of house   Patient has been told she stops breathing in her sleep:(Patient-Rptd) No  Gasping/choking: No  She has issues with the following: (Patient-Rptd) Morning mouth dryness  Denies morning headache, morning nasal congestion, frequent nocturia, nocturnal GERD    Movement:  Patient gets pain, discomfort, with an urge to move:  (Patient-Rptd) Yes - \"the L5 on my right leg can really be cumbersome\", see below, gabapentin 300mg at bedtime helpful    It happens when she is resting:  (Patient-Rptd) Yes  It happens more at night:  (Patient-Rptd) Yes  Patient has been told she kicks her legs at night:  (Patient-Rptd) No     Behaviors in Sleep:  Denies sleepwalking, sleep talking, sleep eating, bruxism, dream enactment, recurring nightmares, night terrors, sleep paralysis, hypnagogic/hypnopompic hallucinations, cataplexy      Is there anything else you would like your sleep provider to know: (Patient-Rptd) My leg pain is due to lower back and nerve pain with my L5/S1 disc and nerves. I'm seeing a pain specialist for this. I have gained significant weight since 2014 and have been working on a weight loss plan as well.    CAFFEINE AND OTHER SUBSTANCES:    Patient consumes caffeinated beverages per day:  (Patient-Rptd) 2  Last caffeine use is usually: (Patient-Rptd) 4:00 pm  List of any prescribed or over the counter stimulants that patient takes: (Patient-Rptd) None  List of " any prescribed or over the counter sleep medication patient takes: (Patient-Rptd) None  List of previous sleep medications that patient has tried: (Patient-Rptd) Melatonin  Patient drinks alcohol to help them sleep: (Patient-Rptd) No  Patient drinks alcohol near bedtime: (Patient-Rptd) No    Family History:  Patient has a family member been diagnosed with a sleep disorder: (Patient-Rptd) Yes  (Patient-Rptd) Mother,  sleep apnea, spouse - sleep apnea     SCALES:    EPWORTH SLEEPINESS SCALE         2/13/2025    12:49 PM    Beaver Sleepiness Scale ( KEIRA Neville  [id number]<br>ESS - [place]/English - Final version - 21 Nov 07 - [facility] [place].)   Sitting and reading Slight chance of dozing   Watching TV Moderate chance of dozing   Sitting, inactive in a public place (e.g. a theatre or a meeting) Slight chance of dozing   As a passenger in a car for an hour without a break High chance of dozing   Lying down to rest in the afternoon when circumstances permit High chance of dozing   Sitting and talking to someone Would never doze   Sitting quietly after a lunch without alcohol Slight chance of dozing   In a car, while stopped for a few minutes in traffic Would never doze   Beaver Score (MC) 11   Beaver Score (Sleep) 11        Patient-reported       INSOMNIA SEVERITY INDEX (JENNI)          2/13/2025    12:38 PM   Insomnia Severity Index (JENNI)   Difficulty falling asleep 1   Difficulty staying asleep 2   Problems waking up too early 1   How SATISFIED/DISSATISFIED are you with your CURRENT sleep pattern? 3   How NOTICEABLE to others do you think your sleep problem is in terms of impairing the quality of your life? 1   How WORRIED/DISTRESSED are you about your current sleep problem? 1   To what extent do you consider your sleep problem to INTERFERE with your daily functioning (e.g. daytime fatigue, mood, ability to function at work/daily chores, concentration, memory, mood, etc.) CURRENTLY? 3   JENNI Total Score 12         Patient-reported         Guidelines for Scoring/Interpretation:  Total score categories:  0-7 = No clinically significant insomnia   8-14 = Subthreshold insomnia   15-21 = Clinical insomnia (moderate severity)  22-28 = Clinical insomnia (severe)  Used via courtesy of www.myhealth.[place].gov with permission from Ady Ramirez PhD., [facility]  Allergies:    No Known Allergies    Medications:    Current Outpatient Medications   Medication Sig Dispense Refill    acetaminophen (TYLENOL) 500 MG tablet Take 1,000 mg by mouth every 8 hours as needed for mild pain.      aspirin-acetaminophen-caffeine (EXCEDRIN MIGRAINE) 250-250-65 MG tablet Take 1 tablet by mouth daily as needed for headaches.      buPROPion (WELLBUTRIN XL) 150 MG 24 hr tablet Take 1 tablet (150 mg) by mouth every morning. 30 tablet 2    cholecalciferol (VITAMIN D3) 25 mcg (1000 units) capsule Take 1 capsule by mouth daily      Cranberry-Vitamin C-Probiotic (AZO CRANBERRY PO) Take 1 capsule by mouth daily      cyclobenzaprine (FLEXERIL) 10 MG tablet Take 1 tablet (10 mg) by mouth 2 times daily as needed for muscle spasms. 28 tablet 0    FLUoxetine (PROZAC) 20 MG capsule Take 1 capsule (20 mg) by mouth daily 90 capsule 3    gabapentin (NEURONTIN) 300 MG capsule Take 1-2 capsules (300-600 mg) by mouth at bedtime. Take 1 capsule at nighttime only for the first 3-7 days, and if tolerated well without dizziness or drowsiness, increase to the 600mg at night 180 capsule 0    Multiple Vitamins-Minerals (HAIR SKIN & NAILS PO) Take 1 each by mouth daily.      naltrexone (DEPADE/REVIA) 50 MG tablet Take 1 tablet (50 mg) by mouth at bedtime. 30 tablet 0    tirzepatide-Weight Management (ZEPBOUND) 2.5 MG/0.5ML prefilled pen Inject 0.5 mLs (2.5 mg) subcutaneously every 7 days. 2 mL 1    tirzepatide-Weight Management (ZEPBOUND) 5 MG/0.5ML prefilled pen Inject 0.5 mLs (5 mg) subcutaneously every 7 days. 2 mL 4       Problem List:  Patient Active Problem List     Diagnosis Date Noted    Osteoarthritis of spine with radiculopathy, lumbosacral region 01/21/2025     Priority: Medium    Adjustment disorder with mixed anxiety and depressed mood 03/07/2024     Priority: Medium    Chronic right-sided low back pain with right-sided sciatica 07/05/2023     Priority: Medium    Bilateral hip pain 07/05/2023     Priority: Medium    Class 1 obesity with serious comorbidity and body mass index (BMI) of 34.0 to 34.9 in adult, unspecified obesity type 10/28/2020     Priority: Medium    Mixed stress and urge urinary incontinence 05/17/2019     Priority: Medium    Intractable chronic migraine without aura 05/17/2019     Priority: Medium        Past Medical/Surgical History:  Past Medical History:   Diagnosis Date    Infertility, female     Migraine      Past Surgical History:   Procedure Laterality Date    BUNIONECTOMY Left     BUNIONECTOMY Right     HC REPAIR OF HAMMERTOE,ONE Right     HYSTEROSCOPY      REPAIR HAMMER TOE Left 9/12/2022    Procedure: 1.  Left second shortening metatarsal osteotomy. 2.  Left second proximal interphalangeal joint fusion.;  Surgeon: Radha Zeng DPM, Podiatry/Foot and Ankle Surgery;  Location: [facility] OR    TONSILLECTOMY  1977       Social History:  Social History     Socioeconomic History    Marital status:      Spouse name: Not on file    Number of children: 1    Years of education: Not on file    Highest education level: Not on file   Occupational History    Not on file   Tobacco Use    Smoking status: Never     Passive exposure: Never    Smokeless tobacco: Never   Vaping Use    Vaping status: Never Used   Substance and Sexual Activity    Alcohol use: No     Comment: occ    Drug use: No    Sexual activity: Yes     Partners: Male   Other Topics Concern    Parent/sibling w/ CABG, MI or angioplasty before 65F 55M? Not Asked   Social History Narrative    Not on file     Social Drivers of Health     Financial Resource Strain: Low Risk  (2/29/2024)     Financial Resource Strain     Within the past 12 months, have you or your family members you live with been unable to get utilities (heat, electricity) when it was really needed?: No   Food Insecurity: Low Risk  (2/29/2024)    Food Insecurity     Within the past 12 months, did you worry that your food would run out before you got money to buy more?: No     Within the past 12 months, did the food you bought just not last and you didn t have money to get more?: No   Transportation Needs: Low Risk  (2/29/2024)    Transportation Needs     Within the past 12 months, has lack of transportation kept you from medical appointments, getting your medicines, non-medical meetings or appointments, work, or from getting things that you need?: No   Physical Activity: Sufficiently Active (2/29/2024)    Exercise Vital Sign     Days of Exercise per Week: 4 days     Minutes of Exercise per Session: 40 min   Stress: Stress Concern Present (2/29/2024)    [employer] - Occupational Stress Questionnaire     Feeling of Stress : To some extent   Social Connections: Unknown (2/29/2024)    Social Connection and Isolation Panel [NHANES]     Frequency of Communication with Friends and Family: Not on file     Frequency of Social Gatherings with Friends and Family: Once a week     Attends Buddhist Services: Not on file     Active Member of Clubs or Organizations: Not on file     Attends Club or Organization Meetings: Not on file     Marital Status: Not on file   Interpersonal Safety: Low Risk  (3/7/2024)    Interpersonal Safety     Do you feel physically and emotionally safe where you currently live?: Yes     Within the past 12 months, have you been hit, slapped, kicked or otherwise physically hurt by someone?: No     Within the past 12 months, have you been humiliated or emotionally abused in other ways by your partner or ex-partner?: No   Housing Stability: Low Risk  (2/29/2024)    Housing Stability     Do you have  "housing? : Yes     Are you worried about losing your housing?: No       Family History:  Family History   Problem Relation Age of Onset    Hypertension Mother     Diabetes Mother     Prostate Problems Father     Arrhythmia Brother         WPW    Obesity Brother     Thyroid Disease Maternal Grandmother     Dementia Maternal Grandmother     Diabetes Maternal Grandfather     Kidney Disease Maternal Grandfather     Heart Disease Maternal Grandfather     Heart Disease Paternal Grandmother     Cancer Paternal Grandfather         throat    Lung Cancer Paternal Grandfather        Review of Systems:  In the last TWO WEEKS have you experienced any of the following symptoms?  Fevers: (Patient-Rptd) No  Night Sweats: (Patient-Rptd) No  Weight Gain: (Patient-Rptd) No  Pain at Night: (Patient-Rptd) Yes  Double Vision: (Patient-Rptd) No  Changes in Vision: (Patient-Rptd) No  Difficulty Breathing through Nose: (Patient-Rptd) No  Sore Throat in Morning: (Patient-Rptd) No  Dry Mouth in the Morning: (Patient-Rptd) Yes  Shortness of Breath Lying Flat: (Patient-Rptd) No  Shortness of Breath With Activity: (Patient-Rptd) No  Awakening with Shortness of Breath: (Patient-Rptd) No  Increased Cough: (Patient-Rptd) No  Heart Racing at Night: (Patient-Rptd) No  Swelling in Feet or Legs: (Patient-Rptd) No  Diarrhea at Night: (Patient-Rptd) No  Heartburn at Night: (Patient-Rptd) No  Urinating More than Once at Night: (Patient-Rptd) No  Losing Control of Urine at Night: (Patient-Rptd) No  Joint Pains at Night: (Patient-Rptd) Yes  Headaches in Morning: (Patient-Rptd) No  Weakness in Arms or Legs: (Patient-Rptd) Yes  Depressed Mood: (Patient-Rptd) No  Anxiety: (Patient-Rptd) No     Physical Examination:  Vitals: Ht 1.676 m (5' 6\")   Wt 94.8 kg (209 lb)   LMP 03/15/2019 (Approximate)   BMI 33.73 kg/m    BMI= Body mass index is 33.73 kg/m .  General appearance: Awake, alert, cooperative. Well groomed. Sitting comfortably in chair. In no apparent " "distress.  HEENT: Head: Normocephalic, atraumatic. Eyes:Conjunctiva clear. Sclera normal. Nose: External appearance without deformity.   Pulmonary:  Able to speak easily in full sentences. No cough or wheeze.   Skin:  No rashes or significant lesions on visible skin.   Neurologic: Alert, oriented x3.   Psychiatric: Mood euthymic. Affect congruent with full range and intensity.           Data: All pertinent previous laboratory data reviewed     Recent Labs   Lab Test 03/07/24  1534 10/24/22  0950    139   POTASSIUM 4.3 4.3   CHLORIDE 104 107   CO2 28 27   ANIONGAP 8 5   GLC 89 107*   BUN 16.8 11   CR 0.66 0.67   ANA LILIA 9.4 9.4       Recent Labs   Lab Test 03/07/24  1534   WBC 7.0   RBC 4.24   HGB 12.4   HCT 39.3   MCV 93   MCH 29.2   MCHC 31.6   RDW 13.2          Recent Labs   Lab Test 03/07/24  1534   PROTTOTAL 7.3   ALBUMIN 4.4   BILITOTAL 0.2   ALKPHOS 69   AST 18   ALT 9       TSH   Date Value   03/07/2024 0.97 uIU/mL   05/17/2019 0.98 mU/L   07/21/2017 1.03 mU/L       No results found for: \"UAMP\", \"UBARB\", \"BENZODIAZEUR\", \"UCANN\", \"UCOC\", \"OPIT\", \"UPCP\"    No results found for: \"IRONSAT\", \"HC49756\", \"SANTOS\"    No results found for: \"PH\", \"PHARTERIAL\", \"PO2\", \"[id number]\", \"SAT\", \"PCO2\", \"HCO3\", \"BASEEXCESS\", \"JASON\", \"BEB\"    @LABRCNTIPR(phv:4,pco2v:4,po2v:4,hco3v:4,luh:4,o2per:4)@    Echocardiology: No results found for this or any previous visit (from the past 4320 hours).    Chest x-ray: No results found for this or any previous visit from the past 365 days.      Chest CT: No results found for this or any previous visit from the past 365 days.      PFT: Most Recent [facility] Pulmonary Function Testing    No results found for: \"20001\"  No results found for: \"20002\"  No results found for: \"20003\"  No results found for: \"20015\"  No results found for: \"20016\"  No results found for: \"20027\"  No results found for: \"20028\"  No results found for: \"20029\"  No results found for: \"20079\"  No results found for: " "\"20080\"  No results found for: \"20081\"  No results found for: \"20335\"  No results found for: \"20105\"  No results found for: \"20053\"  No results found for: \"20054\"  No results found for: \"20055\"      Arleen He PA-C 2/17/2025     [facility]  [address] [address]     [facility]  [address] [address]    Chart documentation was completed, in part, with [employer] voice-recognition software. Even though reviewed, some grammatical, spelling, and word errors may remain.    33 minutes spent on day of encounter reviewing medical records, history and physical examination, review of previous testing and interpretation, documentation and further activities as noted above  "

## 2025-02-17 ENCOUNTER — VIRTUAL VISIT (OUTPATIENT)
Dept: SLEEP MEDICINE | Facility: CLINIC | Age: 55
End: 2025-02-17
Attending: PHYSICIAN ASSISTANT
Payer: COMMERCIAL

## 2025-02-17 VITALS — BODY MASS INDEX: 33.59 KG/M2 | HEIGHT: 66 IN | WEIGHT: 209 LBS

## 2025-02-17 DIAGNOSIS — R06.83 SNORING: Primary | ICD-10-CM

## 2025-02-17 DIAGNOSIS — E66.9 OBESITY (BMI 30-39.9): ICD-10-CM

## 2025-02-17 DIAGNOSIS — F43.23 ADJUSTMENT DISORDER WITH MIXED ANXIETY AND DEPRESSED MOOD: ICD-10-CM

## 2025-02-17 DIAGNOSIS — G47.19 EXCESSIVE DAYTIME SLEEPINESS: ICD-10-CM

## 2025-02-17 PROCEDURE — 98001 SYNCH AUDIO-VIDEO NEW LOW 30: CPT | Performed by: PHYSICIAN ASSISTANT

## 2025-02-17 ASSESSMENT — PAIN SCALES - GENERAL: PAINLEVEL_OUTOF10: NO PAIN (0)

## 2025-02-17 NOTE — NURSING NOTE
Current patient location: [address] [place] [address]  [place] [address]    Is the patient currently in the state of [place]? YES    Visit mode: VIDEO    If the visit is dropped, the patient can be reconnected by:VIDEO VISIT: Text to cell phone:   Telephone Information:   Mobile [phone]       Will anyone else be joining the visit? NO  (If patient encounters technical issues they should call [phone])    Are changes needed to the allergy or medication list? No    Are refills needed on medications prescribed by this physician? NO    Rooming Documentation:  Questionnaire(s) completed    Reason for visit: Consult    Xavi SOF

## 2025-02-17 NOTE — PROGRESS NOTES
Virtual Visit Details    Type of service:  Video Visit   Video start 3:00 PM  Video end 3:19 PM  Originating Location (pt. Location): Work    Distant Location (provider location):  Off-site  Platform used for Video Visit: Brain

## 2025-02-17 NOTE — PATIENT INSTRUCTIONS
"          MY TREATMENT INFORMATION FOR SLEEP APNEA-  Veronica Messina    Am I having a home sleep study?  --->Watch the video for the device you are using:    -/drop off device-   https://www.[employer].com/watch?v=yGGFBdELGhk    Frequently asked questions:  1. What is Obstructive Sleep Apnea (LILA)? LILA is the most common type of sleep apnea. Apnea means, \"without breath.\"  Apnea is most often caused by narrowing or collapse of the upper airway as muscles relax during sleep.   Almost everyone has occasional apneas. Most people with sleep apnea have had brief interruptions at night frequently for many years.  The severity of sleep apnea is related to how frequent and severe the events are.   2. What are the consequences of LILA? Symptoms include: feeling sleepy during the day, snoring loudly, gasping or stopping of breathing, trouble sleeping, and occasionally morning headaches or heartburn at night.  Sleepiness can be serious and even increase the risk of falling asleep while driving. Other health consequences may include development of high blood pressure and other cardiovascular disease in persons who are susceptible. Untreated LILA  can contribute to heart disease, stroke and diabetes.   3. What are the treatment options? In most situations, sleep apnea is a lifelong disease that must be managed with daily therapy. Medications are not effective for sleep apnea and surgery is generally not considered until other therapies have been tried. Your treatment is your choice . Continuous Positive Airway (CPAP) works right away and is the therapy that is effective in nearly everyone. An oral device to hold your jaw forward is usually the next most reliable option. Other options include postioning devices (to keep you off your back), weight loss, and surgery including a tongue pacing device. There is more detail about some of these options below.  4. Are my sleep studies covered by insurance? Although we will request " verification of coverage, we advise you also check in advance of the study to ensure there is coverage.    Important tips for those choosing CPAP and similar devices  REMEMBER-IF YOU RECEIVE A CALL FROM  [phone]-->IT IS TO SETUP A DEVICE  For new devices, sign up for device RAUL to monitor your device for your followup visits  We encourage you to utilize the [employer] raul or website ( https://Atrica.Keyhole.co/ ) to monitor your therapy progress and share the data with your healthcare team when you discuss your sleep apnea.                                                    Know your equipment:  CPAP is continuous positive airway pressure that prevents obstructive sleep apnea by keeping the throat from collapsing while you are sleeping. In most cases, the device is  smart  and can slowly self-adjusts if your throat collapses and keeps a record every day of how well you are treated-this information is available to you and your care team.  BPAP is bilevel positive airway pressure that keeps your throat open and also assists each breath with a pressure boost to maintain adequate breathing.  Special kinds of BPAP are used in patients who have inadequate breathing from lung or heart disease. In most cases, the device is  smart  and can slowly self-adjusts to assist breathing. Like CPAP, the device keeps a record of how well you are treated.  Your mask is your connection to the device. You get to choose what feels most comfortable and the staff will help to make sure if fits. Here: are some examples of the different masks that are available: Magnetic mask aids may assist with use but there are safety issues that should be addressed when considering with magnets* ( see end of discussion).       Key points to remember on your journey with sleep apnea:  Sleep study.  PAP devices often need to be adjusted during a sleep study to show that they are effective and adjusted right.  Good tips to remember: Try wearing just  the mask during a quiet time during the day so your body adapts to wearing it. A humidifier is recommended for comfort in most cases to prevent drying of your nose and throat. Allergy medication from your provider may help you if you are having nasal congestion.  Getting settled-in. It takes more than one night for most of us to get used to wearing a mask. Try wearing just the mask during a quiet time during the day so your body adapts to wearing it. A humidifier is recommended for comfort in most cases. Our team will work with you carefully on the first day and will be in contact within 4 days and again at 2 and 4 weeks for advice and remote device adjustments. Your therapy is evaluated by the device each day.   Use it every night. The more you are able to sleep naturally for 7-8 hours, the more likely you will have good sleep and to prevent health risks or symptoms from sleep apnea. Even if you use it 4 hours it helps. Occasionally all of us are unable to use a medical therapy, in sleep apnea, it is not dangerous to miss one night.   Communicate. Call our skilled team on the number provided on the first day if your visit for problems that make it difficult to wear the device. Over 2 out of 3 patients can learn to wear the device long-term with help from our team. Remember to call our team or your sleep providers if you are unable to wear the device as we may have other solutions for those who cannot adapt to mask CPAP therapy. It is recommended that you sleep your sleep provider within the first 3 months and yearly after that if you are not having problems.   Use it for your health. We encourage use of CPAP masks during daytime quiet periods to allow your face and brain to adapt to the sensation of CPAP so that it will be a more natural sensation to awaken to at night or during naps. This can be very useful during the first few weeks or months of adapting to CPAP though it does not help medically to wear CPAP  during wakefulness and  should not be used as a strategy just to meet guidelines.  Take care of your equipment. Make sure you clean your mask and tubing using directions every day and that your filter and mask are replaced as recommended or if they are not working.     *Masks with magnets:  Updated Contraindications  Masks with magnetic components are contraindicated for use by patients where they, or anyone in close physical contact while using the mask, have the following:   Active medical implants that interact with magnets (i.e., pacemakers, implantable cardioverter defibrillators (ICD), neurostimulators, cerebrospinal fluid (CSF) shunts, insulin/infusion pumps)   Metallic implants/objects containing ferromagnetic material (i.e., aneurysm clips/flow disruption devices, embolic coils, stents, valves, electrodes, implants to restore hearing or balance with implanted magnets, ocular implants, metallic splinters in the eye)  Updated Warning  Keep the mask magnets at a safe distance of at least 6 inches (150 mm) away from implants or medical devices that may be adversely affected by magnetic interference. This warning applies to you or anyone in close physical contact with your mask. The magnets are in the frame and lower headgear clips, with a magnetic field strength of up to 400mT. When worn, they connect to secure the mask but may inadvertently detach while asleep.  Implants/medical devices, including those listed within contraindications, may be adversely affected if they change function under external magnetic fields or contain ferromagnetic materials that attract/repel to magnetic fields (some metallic implants, e.g., contact lenses with metal, dental implants, metallic cranial plates, screws, anirudh hole covers, and bone substitute devices). Consult your physician and  of your implant / other medical device for information on the potential adverse effects of magnetic fields.    BESIDES CPAP, WHAT  OTHER THERAPIES ARE THERE?    Positioning Device  Positioning devices are generally used when sleep apnea is mild and only occurs on your back.This example shows a pillow that straps around the waist. It may be appropriate for those whose sleep study shows milder sleep apnea that occurs primarily when lying flat on one's back. Preliminary studies have shown benefit but effectiveness at home may need to be verified by a home sleep test. These devices are generally not covered by medical insurance.  Examples of devices that maintain sleeping on the back to prevent snoring and mild sleep apnea.    Belt type body positioner  http://WiSpry.Truzip/    Electronic reminder  http://nightshifttherapy.com/            Oral Appliance  What is oral appliance therapy?  An oral appliance device fits on your teeth at night like a retainer used after having braces. The device is made by a specialized dentist and requires several visits over 1-2 months before a manufactured device is made to fit your teeth and is adjusted to prevent your sleep apnea. Once an oral device is working properly, snoring should be improved. A home sleep test may be recommended at that time if to determine whether the sleep apnea is adequately treated.       Some things to remember:  -Oral devices are often, but not always, covered by your medical insurance. Be sure to check with your insurance provider.   -If you are referred for oral therapy, you will be given a list of specialized dentists to consider or you may choose to visit the Web site of the American Academy of Dental Sleep Medicine  -Oral devices are less likely to work if you have severe sleep apnea or are extremely overweight.     More detailed information  An oral appliance is a small acrylic device that fits over the upper and lower teeth  (similar to a retainer or a mouth guard). This device slightly moves jaw forward, which moves the base of the tongue forward, opens the airway, improves  breathing for effective treat snoring and obstructive sleep apnea in perhaps 7 out of 10 people .  The best working devices are custom-made by a dental device  after a mold is made of the teeth 1, 2, 3.  When is an oral appliance indicated?  Oral appliance therapy is recommended as a first-line treatment for patients with primary snoring, mild sleep apnea, and for patients with moderate sleep apnea who prefer appliance therapy to use of CPAP4, 5. Severity of sleep apnea is determined by sleep testing and is based on the number of respiratory events per hour of sleep.   How successful is oral appliance therapy?  The success rate of oral appliance therapy in patients with mild sleep apnea is 75-80% while in patients with moderate sleep apnea it is 50-70%. The chance of success in patients with severe sleep apnea is 40-50%. The research also shows that oral appliances have a beneficial effect on the cardiovascular health of LILA patients at the same magnitude as CPAP therapy7.  Oral appliances should be a second-line treatment in cases of severe sleep apnea, but if not completely successful then a combination therapy utilizing CPAP plus oral appliance therapy may be effective. Oral appliances tend to be effective in a broad range of patients although studies show that the patients who have the highest success are females, younger patients, those with milder disease, and less severe obesity. 3, 6.   Finding a dentist that practices dental sleep medicine  Specific training is available through the American Academy of Dental Sleep Medicine for dentists interested in working in the field of sleep. To find a dentist who is educated in the field of sleep and the use of oral appliances, near you, visit the Web site of the American Academy of Dental Sleep Medicine.    References  1. Barbara et al. Objectively measured vs self-reported compliance during oral appliance therapy for sleep-disordered breathing. Chest  2013; 144(5): 4454-1015.  2. Belen, et al. Objective measurement of compliance during oral appliance therapy for sleep-disordered breathing. Thorax 2013; 68(1): 91-96.  3. Lashonda et al. Mandibular advancement devices in 620 men and women with LILA and snoring: tolerability and predictors of treatment success. Chest 2004; 125: 5055-6590.  4. Serafin et al. Oral appliances for snoring and LILA: a review. Sleep 2006; 29: 244-262.  5. Salma et al. Oral appliance treatment for LILA: an update. J Clin Sleep Med 2014; 10(2): 215-227.  6. Kari et al. Predictors of OSAH treatment outcome. J Dent Res 2007; 86: 6360-2196.      Weight Loss:   Your Body mass index is 33.73 kg/m .    Being overweight does not necessarily mean you will have health consequences.  Those who have BMI over 35 or over 27 with existing medical conditions carries greater risk.   Weight loss decreases severity of sleep apnea in most people with obesity. For those with mild obesity who have developed snoring with weight gain, even 15-30 pound weight loss can improve and occasionally milder eliminate sleep apnea.  Structured and life-long dietary and health habits are necessary to lose weight and keep healthier weight levels.     The Comprehensive Weight loss program offers all aspects of weight loss strategies including two Non-Surgical Weight Loss Programs: Medical Weight Management and our 24 Week Healthy Lifestyle Program:  Medical Weight Management: You will meet with a Medical Weight Management Provider, as well as a Registered Dietician. The program may include medication therapy, dietary education, recommended exercise and physical therapy programs, monthly support group meetings, and possible psychological counseling. Follow up visits with the provider or dietician are scheduled based on your progress and needs.  24 Week Healthy Lifestyle Program: This unique program is designed to give you the support of weekly appointments and  activities thru a 24-week period. It may include all of the components of the basic program (above), with the addition of 11 individual Health  Visits, 24-week access to the [employer] website for over 700 online classes, and monthly support group meetings. This program has an out-of-pocket expense of $499 to cover the items that can not be billed to insurance (health coaches and [employer] access), and is non-refundable/non-transferable (you may be able to use a Health Savings Account; ask your HSA provider). There may be an optional meal replacement plan prescribed as well.   Medication therapy has been approved for the treatment of sleep apnea: The FDA approved tirzepatide for moderate to severe sleep apnea(apnea-hypopnea index greater than or equal to 15 (in patients with BMI of greater than or equal to 30 greater than or equal to 27 was at least 1 weight-related condition such as hypertension or dyslipidemia.  Surgical management achieves meaningful long-term weight loss and improvement in health risks in most patients with more severe obesity.      Sleep Apnea Surgery:    Surgery for obstructive sleep apnea is considered generally only when other therapies fail to work. Surgery may be discussed with you if you are having a difficult time tolerating CPAP and or when there is an abnormal structure that requires surgical correction.  Nose and throat surgeries often enlarge the airway to prevent collapse.  Most of these surgeries create pain for 1-2 weeks and up to half of the most common surgeries are not effective throughout life.  You should carefully discuss the benefits and drawbacks to surgery with your sleep provider and surgeon to determine if it is the best solution for you.   More information  Surgery for LILA is directed at areas that are responsible for narrowing or complete obstruction of the airway during sleep.  There are a wide range of procedures available to enlarge and/or stabilize the airway  to prevent blockage of breathing in the three major areas where it can occur: the palate, tongue, and nasal regions.  Successful surgical treatment depends on the accurate identification of the factors responsible for obstructive sleep apnea in each person.  A personalized approach is required because there is no single treatment that works well for everyone.  Because of anatomic variation, consultation with an examination by a sleep surgeon is a critical first step in determining what surgical options are best for each patient.  In some cases, examination during sedation may be recommended in order to guide the selection of procedures.  Patients will be counseled about risks and benefits as well as the typical recovery course after surgery. Surgery is typically not a cure for a person s LILA.  However, surgery will often significantly improve one s LILA severity (termed  success rate ).  Even in the absence of a cure, surgery will decrease the cardiovascular risk associated with OSA7; improve overall quality of life8 (sleepiness, functionality, sleep quality, etc).      Palate Procedures:  Patients with LILA often have narrowing of their airway in the region of their tonsils and uvula.  The goals of palate procedures are to widen the airway in this region as well as to help the tissues resist collapse.  Modern palate procedure techniques focus on tissue conservation and soft tissue rearrangement, rather than tissue removal.  Often the uvula is preserved in this procedure. Residual sleep apnea is common in patient after pharyngoplasty with an average reduction in sleep apnea events of 33%2.      Tongue Procedures:  ExamWhile patients are awake, the muscles that surround the throat are active and keep this region open for breathing. These muscles relax during sleep, allowing the tongue and other structures to collapse and block breathing.  There are several different tongue procedures available.  Selection of a tongue  base procedure depends on characteristics seen on physical exam.  Generally, procedures are aimed at removing bulky tissues in this area or preventing the back of the tongue from falling back during sleep.  Success rates for tongue surgery range from 50-62%3.    Hypoglossal Nerve Stimulation:  Hypoglossal nerve stimulation has recently received approval from the [place] Food and Drug Administration for the treatment of obstructive sleep apnea.  This is based on research showing that the system was safe and effective in treating sleep apnea6.  Results showed that the median AHI score decreased 68%, from 29.3 to 9.0. This therapy uses an implant system that senses breathing patterns and delivers mild stimulation to airway muscles, which keeps the airway open during sleep.  The system consists of three fully implanted components: a small generator (similar in size to a pacemaker), a breathing sensor, and a stimulation lead.  Using a small handheld remote, a patient turns the therapy on before bed and off upon awakening.    Candidates for this device must be greater than 18 years of age, have moderate to severe obstructive sleep apnea with less than 25% central events  (AHI between 15-65), BMI less than 35, have tried CPAP/oral appliance for at least 8 weeks without success, and have appropriate upper airway anatomy (determined by a sleep endoscopy performed by Dr. Leobardo Constantino or Dr. Raciel Truong).    Nasal Procedures:  Nasal obstruction can interfere with nasal breathing during the day and night.  Studies have shown that relief of nasal obstruction can improve the ability of some patients to tolerate positive airway pressure therapy for obstructive sleep apnea1.  Treatment options include medications such as nasal saline, topical corticosteroid and antihistamine sprays, and oral medications such as antihistamines or decongestants. Non-surgical treatments can include external nasal dilators for selected  patients. If these are not successful by themselves, surgery can improve the nasal airway either alone or in combination with these other options.        Combination Procedures:  Combination of surgical procedures and other treatments may be recommended, particularly if patients have more than one area of narrowing or persistent positional disease.  The success rate of combination surgery ranges from 66-80%2,3.    References  Saeid MORALES. The Role of the Nose in Snoring and Obstructive Sleep Apnoea: An Update.  Eur Arch Otorhinolaryngol. 2011; 268: 1365-73.   Leana SM; Mahsa JA; Soto JR; Pallanch JF; Gutierrez MB; Rosalie SG; Corbin BRENNAN. Surgical modifications of the upper airway for obstructive sleep apnea in adults: a systematic review and meta-analysis. SLEEP 2010;33(10):2275-0946. Mariah LACKEY. Hypopharyngeal surgery in obstructive sleep apnea: an evidence-based medicine review.  Arch Otolaryngol Head Neck Surg. 2006 Feb;132(2):206-13.  Jefferson YH1, Nikhil Y, Raymond MISTY. The efficacy of anatomically based multilevel surgery for obstructive sleep apnea. Otolaryngol Head Neck Surg. 2003 Oct;129(4):327-35.  Mariah LACKEY, Goldberg A. Hypopharyngeal Surgery in Obstructive Sleep Apnea: An Evidence-Based Medicine Review. Arch Otolaryngol Head Neck Surg. 2006 Feb;132(2):206-13.  Oleg PJ et al. Upper-Airway Stimulation for Obstructive Sleep Apnea.  N Engl J Med. 2014 Jan 9;370(2):139-49.  Duy Y et al. Increased Incidence of Cardiovascular Disease in Middle-aged Men with Obstructive Sleep Apnea. Am J Respir Crit Care Med; 2002 166: 159-165  Lopez EM et al. Studying Life Effects and Effectiveness of Palatopharyngoplasty (SLEEP) study: Subjective Outcomes of Isolated Uvulopalatopharyngoplasty. Otolaryngol Head Neck Surg. 2011; 144: 623-631.        WHAT IF I ONLY HAVE SNORING?    Mandibular advancement devices, lateral sleep positioning, long-term weight loss and treatment of nasal allergies have been shown to improve  snoring.  Exercising tongue muscles with a game (https://apps.[employer].VibeDeck/us/raul/soundly-reduce-snoring/tl2935360859) or stimulating the tongue during the day with a device (https://doi.org/10.3390/xfg28581226) have improved snoring in some individuals.  https://www.[employer].VibeDeck/  https://www.sleepfoundation.org/best-anti-snoring-mouthpieces-and-mouthguards    Remember to Drive Safe... Drive Alive     Sleep health profoundly affects your health, mood, and your safety.  Thirty three percent of the population (one in three of us) is not getting enough sleep and many have a sleep disorder. Not getting enough sleep or having an untreated / undertreated sleep condition may make us sleepy without even knowing it. In fact, our driving could be dramatically impaired due to our sleep health. As your provider, here are some things I would like you to know about driving:     Here are some warning signs for impairment and dangerous drowsy driving:              -Having been awake more than 16 hours               -Looking tired               -Eyelid drooping              -Head nodding (it could be too late at this point)              -Driving for more than 30 minutes     Some things you could do to make the driving safer if you are experiencing some drowsiness:              -Stop driving and rest              -Call for transportation              -Make sure your sleep disorder is adequately treated     Some things that have been shown NOT to work when experiencing drowsiness while driving:              -Turning on the radio              -Opening windows              -Eating any  distracting  /  entertaining  foods (e.g., sunflower seeds, candy, or any other)              -Talking on the phone      Your decision may not only impact your life, but also the life of others. Please, remember to drive safe for yourself and all of us.

## 2025-02-27 SDOH — HEALTH STABILITY: PHYSICAL HEALTH: ON AVERAGE, HOW MANY DAYS PER WEEK DO YOU ENGAGE IN MODERATE TO STRENUOUS EXERCISE (LIKE A BRISK WALK)?: 4 DAYS

## 2025-02-27 SDOH — HEALTH STABILITY: PHYSICAL HEALTH: ON AVERAGE, HOW MANY MINUTES DO YOU ENGAGE IN EXERCISE AT THIS LEVEL?: 60 MIN

## 2025-02-27 ASSESSMENT — SOCIAL DETERMINANTS OF HEALTH (SDOH): HOW OFTEN DO YOU GET TOGETHER WITH FRIENDS OR RELATIVES?: PATIENT DECLINED

## 2025-03-04 ENCOUNTER — OFFICE VISIT (OUTPATIENT)
Dept: PEDIATRICS | Facility: CLINIC | Age: 55
End: 2025-03-04
Payer: COMMERCIAL

## 2025-03-04 ENCOUNTER — ORDERS ONLY (AUTO-RELEASED) (OUTPATIENT)
Dept: PEDIATRICS | Facility: CLINIC | Age: 55
End: 2025-03-04

## 2025-03-04 VITALS
DIASTOLIC BLOOD PRESSURE: 83 MMHG | HEART RATE: 69 BPM | RESPIRATION RATE: 17 BRPM | SYSTOLIC BLOOD PRESSURE: 136 MMHG | WEIGHT: 214.7 LBS | HEIGHT: 66 IN | OXYGEN SATURATION: 97 % | TEMPERATURE: 98 F | BODY MASS INDEX: 34.51 KG/M2

## 2025-03-04 DIAGNOSIS — F43.23 ADJUSTMENT DISORDER WITH MIXED ANXIETY AND DEPRESSED MOOD: ICD-10-CM

## 2025-03-04 DIAGNOSIS — Z12.11 SCREEN FOR COLON CANCER: ICD-10-CM

## 2025-03-04 DIAGNOSIS — E66.811 CLASS 1 OBESITY WITH SERIOUS COMORBIDITY AND BODY MASS INDEX (BMI) OF 34.0 TO 34.9 IN ADULT, UNSPECIFIED OBESITY TYPE: ICD-10-CM

## 2025-03-04 DIAGNOSIS — Z00.00 ROUTINE GENERAL MEDICAL EXAMINATION AT A HEALTH CARE FACILITY: Primary | ICD-10-CM

## 2025-03-04 LAB
ALBUMIN SERPL BCG-MCNC: 4.3 G/DL (ref 3.5–5.2)
ALP SERPL-CCNC: 77 U/L (ref 40–150)
ALT SERPL W P-5'-P-CCNC: 8 U/L (ref 0–50)
ANION GAP SERPL CALCULATED.3IONS-SCNC: 8 MMOL/L (ref 7–15)
AST SERPL W P-5'-P-CCNC: 18 U/L (ref 0–45)
BILIRUB DIRECT SERPL-MCNC: <0.08 MG/DL (ref 0–0.3)
BILIRUB SERPL-MCNC: 0.2 MG/DL
BUN SERPL-MCNC: 15.1 MG/DL (ref 6–20)
CALCIUM SERPL-MCNC: 10.4 MG/DL (ref 8.8–10.4)
CHLORIDE SERPL-SCNC: 102 MMOL/L (ref 98–107)
CREAT SERPL-MCNC: 0.7 MG/DL (ref 0.51–0.95)
EGFRCR SERPLBLD CKD-EPI 2021: >90 ML/MIN/1.73M2
EST. AVERAGE GLUCOSE BLD GHB EST-MCNC: 111 MG/DL
GLUCOSE SERPL-MCNC: 101 MG/DL (ref 70–99)
HBA1C MFR BLD: 5.5 % (ref 0–5.6)
HCO3 SERPL-SCNC: 29 MMOL/L (ref 22–29)
POTASSIUM SERPL-SCNC: 4.1 MMOL/L (ref 3.4–5.3)
PROT SERPL-MCNC: 7.3 G/DL (ref 6.4–8.3)
SODIUM SERPL-SCNC: 139 MMOL/L (ref 135–145)
VIT D+METAB SERPL-MCNC: 31 NG/ML (ref 20–50)

## 2025-03-04 PROCEDURE — 82306 VITAMIN D 25 HYDROXY: CPT | Performed by: STUDENT IN AN ORGANIZED HEALTH CARE EDUCATION/TRAINING PROGRAM

## 2025-03-04 PROCEDURE — 3078F DIAST BP <80 MM HG: CPT | Performed by: STUDENT IN AN ORGANIZED HEALTH CARE EDUCATION/TRAINING PROGRAM

## 2025-03-04 PROCEDURE — 82248 BILIRUBIN DIRECT: CPT | Performed by: STUDENT IN AN ORGANIZED HEALTH CARE EDUCATION/TRAINING PROGRAM

## 2025-03-04 PROCEDURE — 99396 PREV VISIT EST AGE 40-64: CPT | Mod: 25 | Performed by: STUDENT IN AN ORGANIZED HEALTH CARE EDUCATION/TRAINING PROGRAM

## 2025-03-04 PROCEDURE — 83036 HEMOGLOBIN GLYCOSYLATED A1C: CPT | Performed by: STUDENT IN AN ORGANIZED HEALTH CARE EDUCATION/TRAINING PROGRAM

## 2025-03-04 PROCEDURE — 90677 PCV20 VACCINE IM: CPT | Performed by: STUDENT IN AN ORGANIZED HEALTH CARE EDUCATION/TRAINING PROGRAM

## 2025-03-04 PROCEDURE — 90471 IMMUNIZATION ADMIN: CPT | Performed by: STUDENT IN AN ORGANIZED HEALTH CARE EDUCATION/TRAINING PROGRAM

## 2025-03-04 PROCEDURE — 90472 IMMUNIZATION ADMIN EACH ADD: CPT | Performed by: STUDENT IN AN ORGANIZED HEALTH CARE EDUCATION/TRAINING PROGRAM

## 2025-03-04 PROCEDURE — 80053 COMPREHEN METABOLIC PANEL: CPT | Performed by: STUDENT IN AN ORGANIZED HEALTH CARE EDUCATION/TRAINING PROGRAM

## 2025-03-04 PROCEDURE — 36415 COLL VENOUS BLD VENIPUNCTURE: CPT | Performed by: STUDENT IN AN ORGANIZED HEALTH CARE EDUCATION/TRAINING PROGRAM

## 2025-03-04 PROCEDURE — 90746 HEPB VACCINE 3 DOSE ADULT IM: CPT | Performed by: STUDENT IN AN ORGANIZED HEALTH CARE EDUCATION/TRAINING PROGRAM

## 2025-03-04 PROCEDURE — 99213 OFFICE O/P EST LOW 20 MIN: CPT | Mod: 25 | Performed by: STUDENT IN AN ORGANIZED HEALTH CARE EDUCATION/TRAINING PROGRAM

## 2025-03-04 PROCEDURE — G2211 COMPLEX E/M VISIT ADD ON: HCPCS | Performed by: STUDENT IN AN ORGANIZED HEALTH CARE EDUCATION/TRAINING PROGRAM

## 2025-03-04 PROCEDURE — 3075F SYST BP GE 130 - 139MM HG: CPT | Performed by: STUDENT IN AN ORGANIZED HEALTH CARE EDUCATION/TRAINING PROGRAM

## 2025-03-04 PROCEDURE — 96127 BRIEF EMOTIONAL/BEHAV ASSMT: CPT | Performed by: STUDENT IN AN ORGANIZED HEALTH CARE EDUCATION/TRAINING PROGRAM

## 2025-03-04 NOTE — PROGRESS NOTES
"Preventive Care Visit  [facility] PEDRO Barragan MD, Internal Medicine  Mar 4, 2025      Assessment & Plan     Routine general medical examination at a health care facility  Presents today for routine visit.    - Basic metabolic panel  (Ca, Cl, CO2, Creat, Gluc, K, Na, BUN); Future  - Basic metabolic panel  (Ca, Cl, CO2, Creat, Gluc, K, Na, BUN)    Adjustment disorder with mixed anxiety and depressed mood  Refill provided today, overall symptoms are well controlled on current dose. Refill provided today   - FLUoxetine (PROZAC) 20 MG capsule; Take 1 capsule (20 mg) by mouth daily.    Screen for colon cancer  Will be due for screening in November.    - COLOGUARD([employer]); Future    Class 1 obesity with serious comorbidity and body mass index (BMI) of 34.0 to 34.9 in adult, unspecified obesity type  Following with weight management clinic   - Hemoglobin A1c  - Vitamin D Screen  - Hepatic panel          The longitudinal plan of care for the diagnosis(es)/condition(s) as documented were addressed during this visit. Due to the added complexity in care, I will continue to support Veronica in the subsequent management and with ongoing continuity of care.  BMI  Estimated body mass index is 35.18 kg/m  as calculated from the following:    Height as of this encounter: 1.664 m (5' 5.5\").    Weight as of this encounter: 97.4 kg (214 lb 11.2 oz).       Counseling  Appropriate preventive services were addressed with this patient via screening, questionnaire, or discussion as appropriate for fall prevention, nutrition, physical activity, Tobacco-use cessation, social engagement, weight loss and cognition.  Checklist reviewing preventive services available has been given to the patient.  Reviewed patient's diet, addressing concerns and/or questions.           Marva Martin is a 55 year old, presenting for the following:  Wellness Visit           HPI        5/31/2023     1:52 PM   PHQ   PHQ-9 " Total Score 7   Q9: Thoughts of better off dead/self-harm past 2 weeks Not at all                      2/27/2025   General Health   How would you rate your overall physical health? Good   Feel stress (tense, anxious, or unable to sleep) Only a little   (!) STRESS CONCERN      2/27/2025   Nutrition   Three or more servings of calcium each day? Yes   Diet: Regular (no restrictions)   How many servings of fruit and vegetables per day? 4 or more   How many sweetened beverages each day? 0-1         2/27/2025   Exercise   Days per week of moderate/strenous exercise 4 days   Average minutes spent exercising at this level 60 min   Working with a  one day a week and treadmill, swim       2/27/2025   Social Factors   Frequency of gathering with friends or relatives Patient declined   Worry food won't last until get money to buy more No   Food not last or not have enough money for food? No   Do you have housing? (Housing is defined as stable permanent housing and does not include staying ouside in a car, in a tent, in an abandoned building, in an overnight shelter, or couch-surfing.) Yes   Are you worried about losing your housing? No   Lack of transportation? No   Unable to get utilities (heat,electricity)? No         2/27/2025   Fall Risk   Fallen 2 or more times in the past year? No   Trouble with walking or balance? No          2/27/2025   Dental   Dentist two times every year? Yes         2/29/2024   TB Screening   Were you born outside of the [place]? No         Today's PHQ-2 Score:       3/3/2025     4:02 PM   PHQ-2 ( 1999 [employer])   Q1: Little interest or pleasure in doing things 0   Q2: Feeling down, depressed or hopeless 0   PHQ-2 Score 0    Q1: Little interest or pleasure in doing things Not at all   Q2: Feeling down, depressed or hopeless Not at all   PHQ-2 Score 0       Patient-reported           2/27/2025   Substance Use   Alcohol more than 3/day or more than 7/wk No   Do you use any other substances  "recreationally? No     Social History     Tobacco Use    Smoking status: Never     Passive exposure: Never    Smokeless tobacco: Never   Vaping Use    Vaping status: Never Used   Substance Use Topics    Alcohol use: No     Comment: occ    Drug use: No           2/4/2025   LAST FHS-7 RESULTS   1st degree relative breast or ovarian cancer No   Any relative bilateral breast cancer No   Any male have breast cancer No   Any ONE woman have BOTH breast AND ovarian cancer No   Any woman with breast cancer before 50yrs No   2 or more relatives with breast AND/OR ovarian cancer No   2 or more relatives with breast AND/OR bowel cancer No                2/27/2025   STI Screening   New sexual partner(s) since last STI/HIV test? No     History of abnormal Pap smear: No - age 30- 64 PAP with HPV every 5 years recommended        Latest Ref Rng & Units 10/24/2022     9:32 AM 7/25/2016    12:00 AM 6/27/2014    12:00 AM   PAP / HPV   PAP  Negative for Intraepithelial Lesion or Malignancy (NILM)      HPV 16 DNA Negative Negative      HPV 18 DNA Negative Negative      Other HR HPV Negative Negative      PAP-ABSTRACT   See Scanned Document     See Scanned Document           This result is from an external source.     ASCVD Risk   The ASCVD Risk score (Angel FLORES, et al., 2019) failed to calculate for the following reasons:    The systolic blood pressure is missing           Reviewed and updated as needed this visit by Provider                             Objective    Exam  LMP 03/15/2019 (Approximate)    Estimated body mass index is 33.73 kg/m  as calculated from the following:    Height as of 2/17/25: 1.676 m (5' 6\").    Weight as of 2/17/25: 94.8 kg (209 lb).    Physical Exam  GENERAL: alert and no distress  EYES: Eyes grossly normal to inspection, PERRL and conjunctivae and sclerae normal  HENT: ear canals and TM's normal, nose and mouth without ulcers or lesions  NECK: no adenopathy, no asymmetry, masses, or scars  RESP: " lungs clear to auscultation - no rales, rhonchi or wheezes  CV: regular rate and rhythm, normal S1 S2, no S3 or S4, no murmur, click or rub, no peripheral edema  ABDOMEN: soft, nontender, no hepatosplenomegaly, no masses   MS: no gross musculoskeletal defects noted, no edema  SKIN: no suspicious lesions or rashes  NEURO: Normal strength and tone, mentation intact and speech normal  PSYCH: mentation appears normal, affect normal/bright        Signed Electronically by: Leonie Barragan MD

## 2025-03-04 NOTE — PATIENT INSTRUCTIONS
Patient Education   Preventive Care Advice   This is general advice given by our system to help you stay healthy. However, your care team may have specific advice just for you. Please talk to your care team about your preventive care needs.  Nutrition  Eat 5 or more servings of fruits and vegetables each day.  Try wheat bread, brown rice and whole grain pasta (instead of white bread, rice, and pasta).  Get enough calcium and vitamin D. Check the label on foods and aim for 100% of the RDA (recommended daily allowance).  Lifestyle  Exercise at least 150 minutes each week  (30 minutes a day, 5 days a week).  Do muscle strengthening activities 2 days a week. These help control your weight and prevent disease.  No smoking.  Wear sunscreen to prevent skin cancer.  Have a dental exam and cleaning every 6 months.  Yearly exams  See your health care team every year to talk about:  Any changes in your health.  Any medicines your care team has prescribed.  Preventive care, family planning, and ways to prevent chronic diseases.  Shots (vaccines)   HPV shots (up to age 26), if you've never had them before.  Hepatitis B shots (up to age 59), if you've never had them before.  COVID-19 shot: Get this shot when it's due.  Flu shot: Get a flu shot every year.  Tetanus shot: Get a tetanus shot every 10 years.  Pneumococcal, hepatitis A, and RSV shots: Ask your care team if you need these based on your risk.  Shingles shot (for age 50 and up)  General health tests  Diabetes screening:  Starting at age 35, Get screened for diabetes at least every 3 years.  If you are younger than age 35, ask your care team if you should be screened for diabetes.  Cholesterol test: At age 39, start having a cholesterol test every 5 years, or more often if advised.  Bone density scan (DEXA): At age 50, ask your care team if you should have this scan for osteoporosis (brittle bones).  Hepatitis C: Get tested at least once in your life.  STIs (sexually  transmitted infections)  Before age 24: Ask your care team if you should be screened for STIs.  After age 24: Get screened for STIs if you're at risk. You are at risk for STIs (including HIV) if:  You are sexually active with more than one person.  You don't use condoms every time.  You or a partner was diagnosed with a sexually transmitted infection.  If you are at risk for HIV, ask about PrEP medicine to prevent HIV.  Get tested for HIV at least once in your life, whether you are at risk for HIV or not.  Cancer screening tests  Cervical cancer screening: If you have a cervix, begin getting regular cervical cancer screening tests starting at age 21.  Breast cancer scan (mammogram): If you've ever had breasts, begin having regular mammograms starting at age 40. This is a scan to check for breast cancer.  Colon cancer screening: It is important to start screening for colon cancer at age 45.  Have a colonoscopy test every 10 years (or more often if you're at risk) Or, ask your provider about stool tests like a FIT test every year or Cologuard test every 3 years.  To learn more about your testing options, visit:   .  For help making a decision, visit:   https://bit.ly/nr91702.  Prostate cancer screening test: If you have a prostate, ask your care team if a prostate cancer screening test (PSA) at age 55 is right for you.  Lung cancer screening: If you are a current or former smoker ages 50 to 80, ask your care team if ongoing lung cancer screenings are right for you.  For informational purposes only. Not to replace the advice of your health care provider. Copyright   2023 [employer]. All rights reserved. Clinically reviewed by the [facility]. M2G [id number] - REV 01/24.

## 2025-03-20 LAB — NONINV COLON CA DNA+OCC BLD SCRN STL QL: NEGATIVE

## 2025-05-01 ASSESSMENT — SLEEP AND FATIGUE QUESTIONNAIRES
HOW LIKELY ARE YOU TO NOD OFF OR FALL ASLEEP WHEN YOU ARE A PASSENGER IN A CAR FOR AN HOUR WITHOUT A BREAK: MODERATE CHANCE OF DOZING
HOW LIKELY ARE YOU TO NOD OFF OR FALL ASLEEP WHILE SITTING AND READING: SLIGHT CHANCE OF DOZING
HOW LIKELY ARE YOU TO NOD OFF OR FALL ASLEEP IN A CAR, WHILE STOPPED FOR A FEW MINUTES IN TRAFFIC: WOULD NEVER DOZE
HOW LIKELY ARE YOU TO NOD OFF OR FALL ASLEEP WHILE SITTING AND TALKING TO SOMEONE: WOULD NEVER DOZE
HOW LIKELY ARE YOU TO NOD OFF OR FALL ASLEEP WHILE SITTING QUIETLY AFTER LUNCH WITHOUT ALCOHOL: SLIGHT CHANCE OF DOZING
HOW LIKELY ARE YOU TO NOD OFF OR FALL ASLEEP WHILE LYING DOWN TO REST IN THE AFTERNOON WHEN CIRCUMSTANCES PERMIT: MODERATE CHANCE OF DOZING
HOW LIKELY ARE YOU TO NOD OFF OR FALL ASLEEP WHILE SITTING INACTIVE IN A PUBLIC PLACE: SLIGHT CHANCE OF DOZING
HOW LIKELY ARE YOU TO NOD OFF OR FALL ASLEEP WHILE WATCHING TV: SLIGHT CHANCE OF DOZING

## 2025-05-06 ENCOUNTER — OFFICE VISIT (OUTPATIENT)
Dept: SLEEP MEDICINE | Facility: CLINIC | Age: 55
End: 2025-05-06
Attending: PHYSICIAN ASSISTANT
Payer: COMMERCIAL

## 2025-05-06 DIAGNOSIS — R06.83 SNORING: ICD-10-CM

## 2025-05-06 DIAGNOSIS — F43.23 ADJUSTMENT DISORDER WITH MIXED ANXIETY AND DEPRESSED MOOD: ICD-10-CM

## 2025-05-06 DIAGNOSIS — E66.9 OBESITY (BMI 30-39.9): ICD-10-CM

## 2025-05-06 DIAGNOSIS — G47.19 EXCESSIVE DAYTIME SLEEPINESS: ICD-10-CM

## 2025-05-06 NOTE — PROGRESS NOTES
Pt is completing a home sleep test. Pt was instructed on how to put on the Noxturnal T3 device and associated equipment before going to bed and given the opportunity to practice putting it on before leaving the sleep center. Pt was reminded to bring the home sleep test kit back to the center tomorrow, at the scheduled time for download and reporting. Patient was instructed to complete study using the following treatment?  None  Neck circumference: 38 CM / 15 inches.  Device number: 24  Yue Traore CMA on 5/6/2025 at 10:42 AM

## 2025-05-07 ENCOUNTER — DOCUMENTATION ONLY (OUTPATIENT)
Dept: SLEEP MEDICINE | Facility: CLINIC | Age: 55
End: 2025-05-07
Payer: COMMERCIAL

## 2025-05-07 NOTE — PROGRESS NOTES
Pt returned HST device. It was downloaded and forwarded data to the clinical specialist for scoring.   Yue Traore CMA on 5/7/2025 at 11:10 AM

## 2025-05-07 NOTE — PROGRESS NOTES
HST POST-STUDY QUESTIONNAIRE    What time did you go to bed?  9:45 pm  How long do you think it took to fall asleep?  60 minutes  What time did you wake up to start the day?  5:15 am  Did you get up during the night at all?  Yes  If you woke up, do you remember approximately what time(s)? 3:00 am  Did you have any difficulty with the equipment?  No  Did you us any type of treatment with this study?  None  Was the head of the bed elevated? No  Did you sleep in a recliner?  No  Did you stop using CPAP at least 3 days before this test?   Any other information you'd like us to know?

## 2025-05-21 ENCOUNTER — MYC MEDICAL ADVICE (OUTPATIENT)
Dept: SLEEP MEDICINE | Facility: CLINIC | Age: 55
End: 2025-05-21
Payer: COMMERCIAL

## 2025-05-21 DIAGNOSIS — G47.33 OSA (OBSTRUCTIVE SLEEP APNEA): Primary | ICD-10-CM

## 2025-07-07 ENCOUNTER — DOCUMENTATION ONLY (OUTPATIENT)
Dept: SLEEP MEDICINE | Facility: CLINIC | Age: 55
End: 2025-07-07
Payer: COMMERCIAL

## 2025-07-07 DIAGNOSIS — G47.33 OBSTRUCTIVE SLEEP APNEA (ADULT) (PEDIATRIC): Primary | ICD-10-CM

## 2025-07-07 NOTE — PROGRESS NOTES
Patient was offered choice of vendor and chose [facility].  Patient Veronica Messina was set up at [facility] on July 7, 2025. Patient received a Resmed Airsense 10 Pressures were set at 5-15 cm H2O.   Patient s ramp is 5 cm H2O for Auto and FLEX/EPR is EPR, 2.  Patient received a [employer] Mask name: DREAMWEAR  Nasal mask size Standard, heated tubing and heated humidifier.  Patient has the following compliance requirements: none  Patient has a follow up on TBD with Arleen He PA-C .    Thalia Wall

## 2025-07-10 ENCOUNTER — DOCUMENTATION ONLY (OUTPATIENT)
Dept: SLEEP MEDICINE | Facility: CLINIC | Age: 55
End: 2025-07-10
Payer: COMMERCIAL

## 2025-07-10 NOTE — PROGRESS NOTES
3 day Sleep therapy management telephone visit    Diagnostic AHI: HST: 15.5        Confirmed with patient at time of call- Yes Patient is still interested in STM service       Subjective measures:  Patient hasn't started using her CPAP yet. She will call once she starts using her CPAP.         Objective data     Order Settings for PAP  CPAP min     CPAP max     CPAP fixed         Device settings from machine CPAP min 5     CPAP max 15                      Assessment: No usage but has account in [employer]      Patient has the following upcoming sleep appts:      Replacement device: No  STM ordered by provider: Yes     Total time spent on accessing and  interpreting remote patient PAP therapy data  10 minutes    Total time spent counseling, coaching  and reviewing PAP therapy data with patient  3 minutes    [id number] no

## 2025-07-23 ENCOUNTER — DOCUMENTATION ONLY (OUTPATIENT)
Dept: SLEEP MEDICINE | Facility: CLINIC | Age: 55
End: 2025-07-23
Payer: COMMERCIAL

## 2025-07-23 NOTE — PROGRESS NOTES
14  DAY STM VISIT    Diagnostic AHI:  HST: 15.5        Subjective measures:   Patient doesn't like her CPAP machine. She doesn't like anything on her face and doesn't care for the noise coming from her CPAP.     Assessment: Pt not meeting objective benchmarks for compliance  Patient failing following subjective benchmarks: pressure issues, not feeling benefit from therapy, mask discomfort , and noise from machine or mask keeping bed partner awake     Action plan: pt to have 30 day STM visit.      Device type: Auto-CPAP    PAP settings: CPAP min 5.0 cm  H20       CPAP max 15.0 cm  H20      95th% pressure 15 cm  H20        RESMED EPR level Setting: TWO    RESMED Soft response setting:  OFF    Mask type:      Objective measures: 14 day rolling measures      Compliance  0 %      Leak  16.2  lpm  last  upload      AHI 48.45   last  upload      Average number of minutes 8      Objective measure goal  Compliance   Goal >70%  Leak   Goal < 24 lpm  AHI  Goal < 5  Usage  Goal >240    Patient has the following upcoming sleep appts:      Total time spent on accessing and interpreting remote patient PAP therapy data  10 minutes    Total time spent counseling, coaching  and reviewing PAP therapy data with patient  4 minutes    [id number]  [id number]  no (3 day STM)

## 2025-08-06 ENCOUNTER — DOCUMENTATION ONLY (OUTPATIENT)
Dept: SLEEP MEDICINE | Facility: CLINIC | Age: 55
End: 2025-08-06
Payer: COMMERCIAL

## 2025-08-14 ENCOUNTER — DOCUMENTATION ONLY (OUTPATIENT)
Dept: SLEEP MEDICINE | Facility: CLINIC | Age: 55
End: 2025-08-14
Payer: COMMERCIAL

## (undated) DEVICE — BLADE SAW SAGITTAL 25.5X9.5X.4MM FINE LINVATEC 5023-138

## (undated) DEVICE — DRSG ADAPTIC 3X8" 6113

## (undated) DEVICE — SOL NACL 0.9% IRRIG 1000ML BOTTLE 2F7124

## (undated) DEVICE — LINEN HALF SHEET 5512

## (undated) DEVICE — GLOVE PROTEXIS BLUE W/NEU-THERA 6.5  2D73EB65

## (undated) DEVICE — DRAPE C-ARM MINI 5423

## (undated) DEVICE — NDL 27GA 1.25" 305136

## (undated) DEVICE — SOLUTION WOUND CLEANSING 3/4OZ 10% PVP EA-L3011FB-50

## (undated) DEVICE — CAST PADDING 4" UNSTERILE 9044

## (undated) DEVICE — PREP POVIDONE IODINE SOLUTION 10% 4OZ BOTTLE 29906-004

## (undated) DEVICE — ESU GROUND PAD ADULT W/CORD E7507

## (undated) DEVICE — PREP SKIN SCRUB TRAY 4461A

## (undated) DEVICE — SU VICRYL 4-0 PS-2 18" UND J496H

## (undated) DEVICE — SU PROLENE 4-0 PS-2 18" 8682G

## (undated) DEVICE — BLADE KNIFE SURG 15 371115

## (undated) DEVICE — TOURNIQUET SGL BLADDER 18"X4" RED 5921-218-135

## (undated) DEVICE — PAD CHUX UNDERPAD 23X24" 7136

## (undated) DEVICE — GLOVE PROTEXIS W/NEU-THERA 6.5  2D73TE65

## (undated) DEVICE — LINEN TOWEL PACK X10 5473

## (undated) DEVICE — SYR 10ML FINGER CONTROL W/O NDL 309695

## (undated) DEVICE — DRSG GAUZE 4X4" TRAY

## (undated) DEVICE — BAG CLEAR TRASH 1.3M 39X33" P4040C

## (undated) DEVICE — SUCTION CANISTER MEDIVAC LINER 3000ML W/LID 65651-530

## (undated) DEVICE — DRSG KERLIX 4 1/2"X4YDS ROLL 6730

## (undated) DEVICE — NDL 18GA 1.5" 305196

## (undated) DEVICE — PREP POVIDONE-IODINE 7.5% SCRUB 4OZ BOTTLE MDS093945

## (undated) DEVICE — PACK LOWER EXTREMITY RIDGES

## (undated) DEVICE — LINEN FULL SHEET 5511

## (undated) DEVICE — SU VICRYL 3-0 PS-2 18" UND J497H

## (undated) RX ORDER — PROPOFOL 10 MG/ML
INJECTION, EMULSION INTRAVENOUS
Status: DISPENSED
Start: 2022-09-12

## (undated) RX ORDER — FENTANYL CITRATE 50 UG/ML
INJECTION, SOLUTION INTRAMUSCULAR; INTRAVENOUS
Status: DISPENSED
Start: 2022-09-12

## (undated) RX ORDER — KETOROLAC TROMETHAMINE 30 MG/ML
INJECTION, SOLUTION INTRAMUSCULAR; INTRAVENOUS
Status: DISPENSED
Start: 2022-09-12

## (undated) RX ORDER — ONDANSETRON 2 MG/ML
INJECTION INTRAMUSCULAR; INTRAVENOUS
Status: DISPENSED
Start: 2022-09-12

## (undated) RX ORDER — BUPIVACAINE HYDROCHLORIDE 5 MG/ML
INJECTION, SOLUTION EPIDURAL; INTRACAUDAL
Status: DISPENSED
Start: 2022-09-12

## (undated) RX ORDER — DEXAMETHASONE SODIUM PHOSPHATE 4 MG/ML
INJECTION, SOLUTION INTRA-ARTICULAR; INTRALESIONAL; INTRAMUSCULAR; INTRAVENOUS; SOFT TISSUE
Status: DISPENSED
Start: 2022-09-12

## (undated) RX ORDER — CEFAZOLIN SODIUM/WATER 2 G/20 ML
SYRINGE (ML) INTRAVENOUS
Status: DISPENSED
Start: 2022-09-12